# Patient Record
Sex: MALE | Race: WHITE | NOT HISPANIC OR LATINO | Employment: FULL TIME | URBAN - METROPOLITAN AREA
[De-identification: names, ages, dates, MRNs, and addresses within clinical notes are randomized per-mention and may not be internally consistent; named-entity substitution may affect disease eponyms.]

---

## 2017-04-28 ENCOUNTER — TELEPHONE (OUTPATIENT)
Dept: FAMILY MEDICINE | Facility: CLINIC | Age: 47
End: 2017-04-28

## 2017-04-28 NOTE — LETTER
[facility]   [address] [place]   [address]  [phone]    April 28, 2017      Aaron Mercado  [address]  [facility] 1/24/17 [facility] [address]              Dear Mr. Mercado,        I hope you are doing well.  Your doctor wanted me to check in with you to see how you are doing with your depression.  Please fill out the enclosed questionnaire(s) and send it back by mail or give us a call to schedule an appointment with your provider so we can assess the status of your health and prevent delays in any future refill requests.        Sincerely,    Quoc Flynn MD

## 2017-05-17 ENCOUNTER — TELEPHONE (OUTPATIENT)
Dept: FAMILY MEDICINE | Facility: CLINIC | Age: 47
End: 2017-05-17

## 2017-05-17 NOTE — TELEPHONE ENCOUNTER
Panel Management Review      Patient has the following on his problem list:     Depression / Dysthymia review  PHQ-9 SCORE 10/14/2016   Total Score 21      Patient is due for:  PHQ9      Composite cancer screening  Chart review shows that this patient is due/due soon for the following None  Summary:    Patient is due/failing the following:   PHQ9    Action needed:   Patient needs to do PHQ9.    Type of outreach:    Phone, left message for patient to call back.     Questions for provider review:    None                                                                                                                                    Cintia Hurst CMA       Chart routed to Care Team .

## 2018-02-08 ENCOUNTER — OFFICE VISIT (OUTPATIENT)
Dept: FAMILY MEDICINE | Facility: CLINIC | Age: 48
End: 2018-02-08
Payer: COMMERCIAL

## 2018-02-08 VITALS
WEIGHT: 273 LBS | HEART RATE: 78 BPM | BODY MASS INDEX: 42.13 KG/M2 | TEMPERATURE: 97.8 F | DIASTOLIC BLOOD PRESSURE: 92 MMHG | OXYGEN SATURATION: 98 % | SYSTOLIC BLOOD PRESSURE: 137 MMHG | RESPIRATION RATE: 18 BRPM

## 2018-02-08 DIAGNOSIS — I10 HYPERTENSION GOAL BP (BLOOD PRESSURE) < 140/90: ICD-10-CM

## 2018-02-08 DIAGNOSIS — F33.42 RECURRENT MAJOR DEPRESSIVE DISORDER, IN FULL REMISSION (H): ICD-10-CM

## 2018-02-08 DIAGNOSIS — E11.9 TYPE 2 DIABETES MELLITUS WITHOUT COMPLICATION, WITHOUT LONG-TERM CURRENT USE OF INSULIN (H): ICD-10-CM

## 2018-02-08 DIAGNOSIS — E78.5 HYPERLIPIDEMIA WITH TARGET LDL LESS THAN 100: ICD-10-CM

## 2018-02-08 DIAGNOSIS — M54.50 ACUTE LEFT-SIDED LOW BACK PAIN WITHOUT SCIATICA: ICD-10-CM

## 2018-02-08 LAB
ANION GAP SERPL CALCULATED.3IONS-SCNC: 4 MMOL/L (ref 3–14)
BUN SERPL-MCNC: 14 MG/DL (ref 7–30)
CALCIUM SERPL-MCNC: 9.1 MG/DL (ref 8.5–10.1)
CHLORIDE SERPL-SCNC: 103 MMOL/L (ref 94–109)
CHOLEST SERPL-MCNC: 194 MG/DL
CO2 SERPL-SCNC: 28 MMOL/L (ref 20–32)
CREAT SERPL-MCNC: 0.69 MG/DL (ref 0.66–1.25)
CREAT UR-MCNC: 215 MG/DL
GFR SERPL CREATININE-BSD FRML MDRD: >90 ML/MIN/1.7M2
GLUCOSE SERPL-MCNC: 107 MG/DL (ref 70–99)
HBA1C MFR BLD: 5.4 % (ref 4.3–6)
HDLC SERPL-MCNC: 40 MG/DL
LDLC SERPL CALC-MCNC: 132 MG/DL
MICROALBUMIN UR-MCNC: 12 MG/L
MICROALBUMIN/CREAT UR: 5.35 MG/G CR (ref 0–17)
NONHDLC SERPL-MCNC: 154 MG/DL
POTASSIUM SERPL-SCNC: 4.1 MMOL/L (ref 3.4–5.3)
SODIUM SERPL-SCNC: 135 MMOL/L (ref 133–144)
TRIGL SERPL-MCNC: 110 MG/DL
TSH SERPL DL<=0.005 MIU/L-ACNC: 2.23 MU/L (ref 0.4–4)

## 2018-02-08 PROCEDURE — 36415 COLL VENOUS BLD VENIPUNCTURE: CPT | Performed by: FAMILY MEDICINE

## 2018-02-08 PROCEDURE — 99214 OFFICE O/P EST MOD 30 MIN: CPT | Mod: 25 | Performed by: FAMILY MEDICINE

## 2018-02-08 PROCEDURE — 80048 BASIC METABOLIC PNL TOTAL CA: CPT | Performed by: FAMILY MEDICINE

## 2018-02-08 PROCEDURE — 82043 UR ALBUMIN QUANTITATIVE: CPT | Performed by: FAMILY MEDICINE

## 2018-02-08 PROCEDURE — 96372 THER/PROPH/DIAG INJ SC/IM: CPT | Performed by: FAMILY MEDICINE

## 2018-02-08 PROCEDURE — 84443 ASSAY THYROID STIM HORMONE: CPT | Performed by: FAMILY MEDICINE

## 2018-02-08 PROCEDURE — 83036 HEMOGLOBIN GLYCOSYLATED A1C: CPT | Performed by: FAMILY MEDICINE

## 2018-02-08 PROCEDURE — 80061 LIPID PANEL: CPT | Performed by: FAMILY MEDICINE

## 2018-02-08 RX ORDER — LISINOPRIL 2.5 MG/1
2.5 TABLET ORAL DAILY
Qty: 90 TABLET | Refills: 0 | Status: SHIPPED | OUTPATIENT
Start: 2018-02-08 | End: 2018-05-31

## 2018-02-08 RX ORDER — KETOROLAC TROMETHAMINE 30 MG/ML
60 INJECTION, SOLUTION INTRAMUSCULAR; INTRAVENOUS ONCE
Qty: 2 ML | Refills: 0 | OUTPATIENT
Start: 2018-02-08 | End: 2018-02-08

## 2018-02-08 RX ORDER — LISINOPRIL 2.5 MG/1
2.5 TABLET ORAL DAILY
Qty: 90 TABLET | Refills: 1 | Status: CANCELLED | OUTPATIENT
Start: 2018-02-08

## 2018-02-08 RX ORDER — CYCLOBENZAPRINE HCL 10 MG
5-10 TABLET ORAL 3 TIMES DAILY PRN
Qty: 30 TABLET | Refills: 0 | Status: SHIPPED | OUTPATIENT
Start: 2018-02-08 | End: 2018-11-18

## 2018-02-08 NOTE — LETTER
February 13, 2018      Aaron Mercado  [address]  [facility] [address]        Dear ,    We are writing to inform you of your test results.    Here are your results for your recent labs.   Your kidney functions are stable.   Your thyroid function is normal.   Your hemoglobin A1C, which measure your average blood sugar for three months has improved.   Your LDL (bad cholesterol) is elevated. Please follow up with Dr. Flynn to discuss other options to help improve your cholesterol. You can also improve your cholesterol by controlling the amount and type of fat you eat and by increasing your daily activity level.    Here are some ways to improve your nutrition:  Eat less fat (especially butter, Crisco and other saturated fats)  Buy lean cuts of meat; reduce your portions of red meat or substitute poultry or fish  Use skim milk and low-fat dairy products  Eat no more than 4 egg yolks per week  Avoid fried or fast foods that are high in fat  Eat more fruits and vegetables    Also consider starting or increasing your aerobic activity; this is the best way to improve HDL (good) cholesterol. If aerobic activity would be new to you, please talk with me first about what activities are safe for you.  Please call or message me if you have questions or concerns.     Resulted Orders   Hemoglobin A1c   Result Value Ref Range    Hemoglobin A1C 5.4 4.3 - 6.0 %   Basic metabolic panel   Result Value Ref Range    Sodium 135 133 - 144 mmol/L    Potassium 4.1 3.4 - 5.3 mmol/L    Chloride 103 94 - 109 mmol/L    Carbon Dioxide 28 20 - 32 mmol/L    Anion Gap 4 3 - 14 mmol/L    Glucose 107 (H) 70 - 99 mg/dL      Comment:      Fasting specimen    Urea Nitrogen 14 7 - 30 mg/dL    Creatinine 0.69 0.66 - 1.25 mg/dL    GFR Estimate >90 >60 mL/min/1.7m2      Comment:      Non  GFR Calc    GFR Estimate If Black >90 >60 mL/min/1.7m2      Comment:       GFR Calc    Calcium 9.1 8.5 - 10.1  mg/dL   Albumin Random Urine Quantitative with Creat Ratio   Result Value Ref Range    Creatinine Urine 215 mg/dL    Albumin Urine mg/L 12 mg/L    Albumin Urine mg/g Cr 5.35 0 - 17 mg/g Cr   Lipid Profile (Chol, Trig, HDL, LDL calc)   Result Value Ref Range    Cholesterol 194 <200 mg/dL    Triglycerides 110 <150 mg/dL      Comment:      Fasting specimen    HDL Cholesterol 40 >39 mg/dL    LDL Cholesterol Calculated 132 (H) <100 mg/dL      Comment:      Above desirable:  100-129 mg/dl  Borderline High:  130-159 mg/dL  High:             160-189 mg/dL  Very high:       >189 mg/dl      Non HDL Cholesterol 154 (H) <130 mg/dL      Comment:      Above Desirable:  130-159 mg/dl  Borderline high:  160-189 mg/dl  High:             190-219 mg/dl  Very high:       >219 mg/dl     TSH with free T4 reflex   Result Value Ref Range    TSH 2.23 0.40 - 4.00 mU/L       If you have any questions or concerns, please call the clinic at the number listed above.       Sincerely,    Geovany Escudero MD/nr

## 2018-02-08 NOTE — PROGRESS NOTES
SUBJECTIVE:   Aaron Mercado is a 47 year old male who presents to clinic today for the following health issues:    Back Pain       Duration: few days ago         Specific cause: he got up and it almost floored him, he went to the gym and used a roller and it felt good, the next day he also did it and he couldn't get off the ground  He drove to work and it was sore. When he sat down to go to the bathroom, he couldn't get up and when he got up, he couldn't move due to the pain. He felt pain in the bottom of his back. He had to leave work. He sat in his car for 10 mins. He drove to his friend house and laid on the couch. His friends his a physician and was told to take Ibuprofen 800 mg and to sit with his knees up. He wasn't able to move for a few hours due to the pain. Yesterday he felt tugging of his testicle, now resolved. No pain, swelling or discharge.     Description:   Location of pain: left lower back   Character of pain: stabbing  Pain radiation: once radiated to whole back   New numbness or weakness in legs, not attributed to pain:  no     Intensity: mild to severe     History:   Pain interferes with job: yes  History of back problems: no prior back problems  Any previous MRI or X-rays: None  Sees a specialist for back pain:  No  Therapies tried without relief: see above     Alleviating factors:   Improved by: roller initially helped       Precipitating factors:  Worsened by: Sitting    Accompanying Signs & Symptoms:  Risk of Fracture:  None  Risk of Cauda Equina:  None  Risk of Infection:  None  Risk of Cancer:  None  Risk of Ankylosing Spondylitis:  Onset at age <35, male, AND morning back stiffness. no       Diabetes mellitus - He takes metformin if he remembers.   HTN - Pt isn't currently taking the lisinopril.   MDD - Stopped taking celexa.   HLD - Stopped taking Lipitor as he was getting muscle soreness.       Problem list and histories reviewed & adjusted, as indicated.  Additional history: as  documented    Labs reviewed in EPIC    Reviewed and updated as needed this visit by clinical staff       Reviewed and updated as needed this visit by Provider         ROS:  Constitutional, HEENT, cardiovascular, pulmonary, gi and gu systems are negative, except as otherwise noted.    OBJECTIVE:     BP (!) 137/92  Pulse 78  Temp 97.8  F (36.6  C) (Oral)  Resp 18  Wt 273 lb (123.8 kg)  SpO2 98%  BMI 42.13 kg/m2  Body mass index is 42.13 kg/(m^2).  GENERAL: healthy, alert and no distress  EYES: Eyes grossly normal to inspection  HENT: nose and mouth without ulcers or lesions  MS: no gross musculoskeletal defects noted, no edema  BACK: + midline and left paralumbar tenderness, normal ROM    Diagnostic Test Results:  none     ASSESSMENT/PLAN:       1. Acute left-sided low back pain without sciatica  - likely due to lumbar strain  - ketorolac (TORADOL) 60 MG/2ML SOLN injection; Inject 2 mLs (60 mg) into the muscle once for 1 dose  Dispense: 2 mL; Refill: 0  - cyclobenzaprine (FLEXERIL) 10 MG tablet; Take 0.5-1 tablets (5-10 mg) by mouth 3 times daily as needed for muscle spasms  Dispense: 30 tablet; Refill: 0  - KETOROLAC TROMETHAMINE 15MG  - INJECTION INTRAMUSCULAR OR SUB-Q  - No Advil, aleve or aspirin over next 48 hours   - Tylenol 500 to 1000 mg every 8 hours as needed for pain, maximum daily dose is 3, 000 mg   - Flexeril 5 to 10 mg every 8 hours as needed for pain, please do not drive after taking medication or take it with alcohol   - Ice/heat pack 5 to 10 minutes, every 4 hours  - Follow if symptoms worsen or fail to improve.     2. Type 2 diabetes mellitus without complication, without long-term current use of insulin (H)  - advised to restart metformin   - metFORMIN (GLUCOPHAGE) 500 MG tablet; Take 1 tablet (500 mg) by mouth daily (with dinner)  Dispense: 90 tablet; Refill: 0  - Hemoglobin A1c  - TSH with free T4 reflex  - OPHTHALMOLOGY ADULT REFERRAL    3. Recurrent major depressive disorder, in full  remission (H)  PHQ-9 SCORE 10/14/2016 2/8/2018 2/8/2018   Total Score 21 2 0   - pt has already stopped celexa     4. Hypertension goal BP (blood pressure) < 140/90  - not controlled, restart lisinopril   - lisinopril (PRINIVIL/ZESTRIL) 2.5 MG tablet; Take 1 tablet (2.5 mg) by mouth daily  Dispense: 90 tablet; Refill: 0  - aspirin 81 MG tablet; Take 1 tablet (81 mg) by mouth daily  Dispense: 90 tablet; Refill: 3  - Basic metabolic panel  - Albumin Random Urine Quantitative with Creat Ratio  - follow up in 2 weeks     5. Hyperlipidemia with target LDL less than 100  - myalgia 2/2 Lipitor, Lipitor listed as allergy, not true allergy   - Lipid Profile (Chol, Trig, HDL, LDL calc)        Geovany Escudero MD  [facility]

## 2018-02-08 NOTE — MR AVS SNAPSHOT
After Visit Summary   2/8/2018    Aaron Mercado    MRN: [id number]           Patient Information     Date Of Birth          1970        Visit Information        Provider Department      2/8/2018 7:40 AM Geovany Escudero MD [facility]        Today's Diagnoses     Acute left-sided low back pain without sciatica    -  1    Type 2 diabetes mellitus without complication, without long-term current use of insulin (H)        Hypertension goal BP (blood pressure) < 140/90        Hyperlipidemia with target LDL less than 100          Care Instructions    - No Advil, aleve or aspirin over next 48 hours   - Tylenol 500 to 1000 mg every 8 hours as needed for pain, maximum daily dose is 3, 000 mg   - Flexeril 5 to 10 mg every 8 hours as needed for pain, please do not drive after taking medication or take it with alcohol   - Ice/heat pack 5 to 10 minutes, every 4 hours  - Follow if symptoms worsen or fail to improve.           Follow-ups after your visit        Additional Services     OPHTHALMOLOGY ADULT REFERRAL       Your provider has referred you to: [facility]: Eye [facility] [phone]   [email or web address]    Please be aware that coverage of these services is subject to the terms and limitations of your health insurance plan.  Call member services at your health plan with any benefit or coverage questions.      Please bring the following with you to your appointment:    (1) Any X-Rays, CTs or MRIs which have been performed.  Contact the facility where they were done to arrange for  prior to your scheduled appointment.    (2) List of current medications  (3) This referral request   (4) Any documents/labs given to you for this referral                  Who to contact     If you have questions or need follow up information about today's clinic visit or your schedule please contact [facility] directly at [phone].  Normal or  "non-critical lab and imaging results will be communicated to you by MyChart, letter or phone within 4 business days after the clinic has received the results. If you do not hear from us within 7 days, please contact the clinic through [employer] or phone. If you have a critical or abnormal lab result, we will notify you by phone as soon as possible.  Submit refill requests through [employer] or call your pharmacy and they will forward the refill request to us. Please allow 3 business days for your refill to be completed.          Additional Information About Your Visit        [employer] Information     [employer] lets you send messages to your doctor, view your test results, renew your prescriptions, schedule appointments and more. To sign up, go to [email or web address] . Click on \"Log in\" on the left side of the screen, which will take you to the Welcome page. Then click on \"Sign up Now\" on the right side of the page.     You will be asked to enter the access code listed below, as well as some personal information. Please follow the directions to create your username and password.     Your access code is: [email or web address]  Expires: 2018  8:22 AM     Your access code will  in 90 days. If you need help or a new code, please call your [place] clinic or [phone].        Care EveryWhere ID     This is your Care EveryWhere ID. This could be used by other organizations to access your [place] medical records  [id number]        Your Vitals Were     Pulse Temperature Respirations Pulse Oximetry BMI (Body Mass Index)       78 97.8  F (36.6  C) (Oral) 18 98% 42.13 kg/m2        Blood Pressure from Last 3 Encounters:   18 (!) 137/92   16 120/88   10/14/16 (!) 146/94    Weight from Last 3 Encounters:   18 273 lb (123.8 kg)   16 257 lb (116.6 kg)   10/14/16 260 lb 8 oz (118.2 kg)              We Performed the Following     Albumin Random Urine Quantitative with Creat Ratio     Basic metabolic panel     " Hemoglobin A1c     Lipid Profile (Chol, Trig, HDL, LDL calc)     OPHTHALMOLOGY ADULT REFERRAL     TSH with free T4 reflex          Today's Medication Changes          These changes are accurate as of 2/8/18  8:22 AM.  If you have any questions, ask your nurse or doctor.               Start taking these medicines.        Dose/Directions    cyclobenzaprine 10 MG tablet   Commonly known as:  FLEXERIL   Used for:  Acute left-sided low back pain without sciatica   Started by:  Geovany Escudero MD        Dose:  5-10 mg   Take 0.5-1 tablets (5-10 mg) by mouth 3 times daily as needed for muscle spasms   Quantity:  30 tablet   Refills:  0       ketorolac 60 MG/2ML Soln injection   Commonly known as:  TORADOL   Used for:  Acute left-sided low back pain without sciatica   Started by:  Geovany Escudero MD        Dose:  60 mg   Inject 2 mLs (60 mg) into the muscle once for 1 dose   Quantity:  2 mL   Refills:  0         Stop taking these medicines if you haven't already. Please contact your care team if you have questions.     atorvastatin 10 MG tablet   Commonly known as:  LIPITOR   Stopped by:  Geovany Escudero MD           citalopram 20 MG tablet   Commonly known as:  celeXA   Stopped by:  Geovany Escudero MD           zolpidem 5 MG tablet   Commonly known as:  AMBIEN   Stopped by:  Geovany Escudero MD                Where to get your medicines      These medications were sent to [facility] [address]  [address], [facility] [address]     Phone:  [phone]     aspirin 81 MG tablet    cyclobenzaprine 10 MG tablet    lisinopril 2.5 MG tablet    metFORMIN 500 MG tablet         Some of these will need a paper prescription and others can be bought over the counter.  Ask your nurse if you have questions.     You don't need a prescription for these medications     ketorolac 60 MG/2ML Soln injection                Primary Care Provider Office Phone # Fax #     Quoc Flynn [phone] [address] [place] [id number]        Equal Access to Services     BRANDY SAMSON : Hadprema aad ku hadbeth Charles, hemalatha manley, qaann marieta kayogeshda kenyetta, josephine essiein hayaan lyndsyekathy bliss allison manzo. So [facility] [phone].    ATENCIÓN: Si habla español, tiene a nails disposición servicios gratuitos de asistencia lingüística. Llame al [phone].    We comply with applicable federal civil rights laws and [place] laws. We do not discriminate on the basis of race, color, national origin, age, disability, sex, sexual orientation, or gender identity.            Thank you!     Thank you for choosing [facility]  for your care. Our goal is always to provide you with excellent care. Hearing back from our patients is one way we can continue to improve our services. Please take a few minutes to complete the written survey that you may receive in the mail after your visit with us. Thank you!             Your Updated Medication List - Protect others around you: Learn how to safely use, store and throw away your medicines at www.disposemymeds.org.          This list is accurate as of 2/8/18  8:22 AM.  Always use your most recent med list.                   Brand Name Dispense Instructions for use Diagnosis    aspirin 81 MG tablet     90 tablet    Take 1 tablet (81 mg) by mouth daily    Hypertension goal BP (blood pressure) < 140/90       blood glucose monitoring lancets     1 each    1 Box 2 times daily    Type 2 diabetes, HbA1c goal < 7% (H)       blood glucose monitoring test strip    PIETER CONTOUR NEXT    1 Month    1 Month by In Vitro route 2 times daily    Type 2 diabetes, HbA1c goal < 7% (H)       cyclobenzaprine 10 MG tablet    FLEXERIL    30 tablet    Take 0.5-1 tablets (5-10 mg) by mouth 3 times daily as needed for muscle spasms    Acute left-sided low back pain without sciatica       ketorolac 60 MG/2ML Soln injection    TORADOL    2 mL     Inject 2 mLs (60 mg) into the muscle once for 1 dose    Acute left-sided low back pain without sciatica       lisinopril 2.5 MG tablet    PRINIVIL/Zestril    90 tablet    Take 1 tablet (2.5 mg) by mouth daily    Hypertension goal BP (blood pressure) < 140/90       metFORMIN 500 MG tablet    GLUCOPHAGE    90 tablet    Take 1 tablet (500 mg) by mouth daily (with dinner)    Type 2 diabetes mellitus without complication, without long-term current use of insulin (H)       order for DME     1 each    Equipment being ordered: glucometer    Type 2 diabetes, HbA1c goal < 7% (H)       sildenafil 50 MG tablet    VIAGRA    6 tablet    Take 0.5-1 tablets (25-50 mg) by mouth daily as needed for erectile dysfunction Take 30 min to 4 hours before intercourse.    ED (erectile dysfunction)

## 2018-02-08 NOTE — NURSING NOTE
Prior to injection verified patient identity using patient's name and date of birth.    Due to injection administration, patient instructed to remain in clinic for 15 minutes  afterwards, and to report any adverse reaction to me immediately.    The following medication was given:     MEDICATION: Ketorolac Tromethamine 60MG/2ML (30 mg/mL) (Toradol)  ROUTE: IM  SITE: Ventrogluteal - Left (1ml) and Ventrogluteal - Right (1ml)  DOSE: 2 ml  LOT #: [id number]  :  Melodigram  EXPIRATION DATE:  09/2019  ND: [id number]    Ofelia Badillo MA

## 2018-02-08 NOTE — PATIENT INSTRUCTIONS
- No Advil, aleve or aspirin over next 48 hours   - Tylenol 500 to 1000 mg every 8 hours as needed for pain, maximum daily dose is 3, 000 mg   - Flexeril 5 to 10 mg every 8 hours as needed for pain, please do not drive after taking medication or take it with alcohol   - Ice/heat pack 5 to 10 minutes, every 4 hours  - Follow if symptoms worsen or fail to improve.

## 2018-02-09 ASSESSMENT — PATIENT HEALTH QUESTIONNAIRE - PHQ9: SUM OF ALL RESPONSES TO PHQ QUESTIONS 1-9: 0

## 2018-02-12 NOTE — PROGRESS NOTES
Please send the letter to the patient with the following.         Here are your results for your recent labs.   Your kidney functions are stable.   Your thyroid function is normal.   Your hemoglobin A1C, which measure your average blood sugar for three months has improved.   Your LDL (bad cholesterol) is elevated. Please follow up with Dr. Flynn to discuss other options to help improve your cholesterol. You can also improve your cholesterol by controlling the amount and type of fat you eat and by increasing your daily activity level.    Here are some ways to improve your nutrition:  Eat less fat (especially butter, Crisco and other saturated fats)  Buy lean cuts of meat; reduce your portions of red meat or substitute poultry or fish  Use skim milk and low-fat dairy products  Eat no more than 4 egg yolks per week  Avoid fried or fast foods that are high in fat  Eat more fruits and vegetables    Also consider starting or increasing your aerobic activity; this is the best way to improve HDL (good) cholesterol. If aerobic activity would be new to you, please talk with me first about what activities are safe for you.  Please call or message me if you have questions or concerns.

## 2018-05-31 ENCOUNTER — OFFICE VISIT (OUTPATIENT)
Dept: FAMILY MEDICINE | Facility: CLINIC | Age: 48
End: 2018-05-31
Payer: COMMERCIAL

## 2018-05-31 VITALS
BODY MASS INDEX: 40.2 KG/M2 | HEART RATE: 84 BPM | RESPIRATION RATE: 19 BRPM | OXYGEN SATURATION: 96 % | SYSTOLIC BLOOD PRESSURE: 137 MMHG | TEMPERATURE: 98.6 F | DIASTOLIC BLOOD PRESSURE: 95 MMHG | WEIGHT: 260.5 LBS

## 2018-05-31 DIAGNOSIS — I10 HYPERTENSION GOAL BP (BLOOD PRESSURE) < 140/90: ICD-10-CM

## 2018-05-31 DIAGNOSIS — G47.10 EXCESSIVE SLEEPINESS: ICD-10-CM

## 2018-05-31 DIAGNOSIS — H53.9 VISUAL DISTURBANCE: ICD-10-CM

## 2018-05-31 DIAGNOSIS — M54.2 CERVICALGIA: ICD-10-CM

## 2018-05-31 DIAGNOSIS — E11.9 TYPE 2 DIABETES MELLITUS WITHOUT COMPLICATION, WITHOUT LONG-TERM CURRENT USE OF INSULIN (H): ICD-10-CM

## 2018-05-31 DIAGNOSIS — G44.219 EPISODIC TENSION-TYPE HEADACHE, NOT INTRACTABLE: Primary | ICD-10-CM

## 2018-05-31 DIAGNOSIS — R53.83 FATIGUE, UNSPECIFIED TYPE: ICD-10-CM

## 2018-05-31 LAB
BASOPHILS # BLD AUTO: 0.1 10E9/L (ref 0–0.2)
BASOPHILS NFR BLD AUTO: 1 %
DIFFERENTIAL METHOD BLD: ABNORMAL
EOSINOPHIL # BLD AUTO: 0.1 10E9/L (ref 0–0.7)
EOSINOPHIL NFR BLD AUTO: 2.6 %
ERYTHROCYTE [DISTWIDTH] IN BLOOD BY AUTOMATED COUNT: 13.1 % (ref 10–15)
HCT VFR BLD AUTO: 46.9 % (ref 40–53)
HGB BLD-MCNC: 16.4 G/DL (ref 13.3–17.7)
LYMPHOCYTES # BLD AUTO: 0.9 10E9/L (ref 0.8–5.3)
LYMPHOCYTES NFR BLD AUTO: 17.5 %
MCH RBC QN AUTO: 33.1 PG (ref 26.5–33)
MCHC RBC AUTO-ENTMCNC: 35 G/DL (ref 31.5–36.5)
MCV RBC AUTO: 95 FL (ref 78–100)
MONOCYTES # BLD AUTO: 0.4 10E9/L (ref 0–1.3)
MONOCYTES NFR BLD AUTO: 8.5 %
NEUTROPHILS # BLD AUTO: 3.6 10E9/L (ref 1.6–8.3)
NEUTROPHILS NFR BLD AUTO: 70.4 %
PLATELET # BLD AUTO: 264 10E9/L (ref 150–450)
RBC # BLD AUTO: 4.96 10E12/L (ref 4.4–5.9)
WBC # BLD AUTO: 5.1 10E9/L (ref 4–11)

## 2018-05-31 PROCEDURE — 36415 COLL VENOUS BLD VENIPUNCTURE: CPT | Performed by: FAMILY MEDICINE

## 2018-05-31 PROCEDURE — 99214 OFFICE O/P EST MOD 30 MIN: CPT | Performed by: FAMILY MEDICINE

## 2018-05-31 PROCEDURE — 80053 COMPREHEN METABOLIC PANEL: CPT | Performed by: FAMILY MEDICINE

## 2018-05-31 PROCEDURE — 99207 C FOOT EXAM  NO CHARGE: CPT | Mod: 25 | Performed by: FAMILY MEDICINE

## 2018-05-31 PROCEDURE — 85025 COMPLETE CBC W/AUTO DIFF WBC: CPT | Performed by: FAMILY MEDICINE

## 2018-05-31 PROCEDURE — 84443 ASSAY THYROID STIM HORMONE: CPT | Performed by: FAMILY MEDICINE

## 2018-05-31 RX ORDER — LISINOPRIL 5 MG/1
5 TABLET ORAL DAILY
Qty: 90 TABLET | Refills: 0 | Status: SHIPPED | OUTPATIENT
Start: 2018-05-31 | End: 2018-06-13

## 2018-05-31 RX ORDER — NAPROXEN 500 MG/1
500 TABLET ORAL 2 TIMES DAILY WITH MEALS
Qty: 10 TABLET | Refills: 0 | Status: SHIPPED | OUTPATIENT
Start: 2018-05-31 | End: 2018-06-05

## 2018-05-31 ASSESSMENT — ANXIETY QUESTIONNAIRES
5. BEING SO RESTLESS THAT IT IS HARD TO SIT STILL: NOT AT ALL
GAD7 TOTAL SCORE: 13
IF YOU CHECKED OFF ANY PROBLEMS ON THIS QUESTIONNAIRE, HOW DIFFICULT HAVE THESE PROBLEMS MADE IT FOR YOU TO DO YOUR WORK, TAKE CARE OF THINGS AT HOME, OR GET ALONG WITH OTHER PEOPLE: VERY DIFFICULT
7. FEELING AFRAID AS IF SOMETHING AWFUL MIGHT HAPPEN: NEARLY EVERY DAY
2. NOT BEING ABLE TO STOP OR CONTROL WORRYING: MORE THAN HALF THE DAYS
1. FEELING NERVOUS, ANXIOUS, OR ON EDGE: MORE THAN HALF THE DAYS
3. WORRYING TOO MUCH ABOUT DIFFERENT THINGS: NEARLY EVERY DAY
6. BECOMING EASILY ANNOYED OR IRRITABLE: MORE THAN HALF THE DAYS

## 2018-05-31 ASSESSMENT — PATIENT HEALTH QUESTIONNAIRE - PHQ9: 5. POOR APPETITE OR OVEREATING: SEVERAL DAYS
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
## 2018-05-31 NOTE — LETTER
My Depression Action Plan  Name: Aaron Mercado   Date of Birth 1970  Date: 5/31/2018    My doctor: Quoc Flynn   My clinic: [address] [address] [phone]          GREEN    ZONE   Good Control    What it looks like:     Things are going generally well. You have normal up s and down s. You may even feel depressed from time to time, but bad moods usually last less than a day.   What you need to do:  1. Continue to care for yourself (see self care plan)  2. Check your depression survival kit and update it as needed  3. Follow your physician s recommendations including any medication.  4. Do not stop taking medication unless you consult with your physician first.           YELLOW         ZONE Getting Worse    What it looks like:     Depression is starting to interfere with your life.     It may be hard to get out of bed; you may be starting to isolate yourself from others.    Symptoms of depression are starting to last most all day and this has happened for several days.     You may have suicidal thoughts but they are not constant.   What you need to do:     1. Call your care team, your response to treatment will improve if you keep your care team informed of your progress. Yellow periods are signs an adjustment may need to be made.     2. Continue your self-care, even if you have to fake it!    3. Talk to someone in your support network    4. Open up your depression survival kit           RED    ZONE Medical Alert - Get Help    What it looks like:     Depression is seriously interfering with your life.     You may experience these or other symptoms: You can t get out of bed most days, can t work or engage in other necessary activities, you have trouble taking care of basic hygiene, or basic responsibilities, thoughts of suicide or death that will not go away, self-injurious behavior.     What you need to do:  1. Call your care  team and request a same-day appointment. If they are not available (weekends or after hours) call your local crisis line, emergency room or 911.            Depression Self Care Plan / Survival Kit    Self-Care for Depression  Here s the deal. Your body and mind are really not as separate as most people think.  What you do and think affects how you feel and how you feel influences what you do and think. This means if you do things that people who feel good do, it will help you feel better.  Sometimes this is all it takes.  There is also a place for medication and therapy depending on how severe your depression is, so be sure to consult with your medical provider and/ or Behavioral Health Consultant if your symptoms are worsening or not improving.     In order to better manage my stress, I will:    Exercise  Get some form of exercise, every day. This will help reduce pain and release endorphins, the  feel good  chemicals in your brain. This is almost as good as taking antidepressants!  This is not the same as joining a gym and then never going! (they count on that by the way ) It can be as simple as just going for a walk or doing some gardening, anything that will get you moving.      Hygiene   Maintain good hygiene (Get out of bed in the morning, Make your bed, Brush your teeth, Take a shower, and Get dressed like you were going to work, even if you are unemployed).  If your clothes don't fit try to get ones that do.    Diet  I will strive to eat foods that are good for me, drink plenty of water, and avoid excessive sugar, caffeine, alcohol, and other mood-altering substances.  Some foods that are helpful in depression are: complex carbohydrates, B vitamins, flaxseed, fish or fish oil, fresh fruits and vegetables.    Psychotherapy  I agree to participate in Individual Therapy (if recommended).    Medication  If prescribed medications, I agree to take them.  Missing doses can result in serious side effects.  I  understand that drinking alcohol, or other illicit drug use, may cause potential side effects.  I will not stop my medication abruptly without first discussing it with my provider.    Staying Connected With Others  I will stay in touch with my friends, family members, and my primary care provider/team.    Use your imagination  Be creative.  We all have a creative side; it doesn t matter if it s oil painting, sand castles, or mud pies! This will also kick up the endorphins.    Witness Beauty  (AKA stop and smell the roses) Take a look outside, even in mid-winter. Notice colors, textures. Watch the squirrels and birds.     Service to others  Be of service to others.  There is always someone else in need.  By helping others we can  get out of ourselves  and remember the really important things.  This also provides opportunities for practicing all the other parts of the program.    Humor  Laugh and be silly!  Adjust your TV habits for less news and crime-drama and more comedy.    Control your stress  Try breathing deep, massage therapy, biofeedback, and meditation. Find time to relax each day.     My support system    Clinic Contact:  Phone number:    Contact 1:  Phone number:    Contact 2:  Phone number:    [facility]/:  Phone number:    Therapist:  Phone number:    Local crisis center:    Phone number:    Other community support:  Phone number:

## 2018-05-31 NOTE — PATIENT INSTRUCTIONS
Suspect tension headaches  May be related to stress and BP  Do labs  Naproxen 500 mg twice a day 5 days with food  Try the flexeril you have at home may help    See neuro and do MRI head if  Not better  Increase lisinopril to 5 mg daily  Follow up in 2 week with dr chester yang if has time today   address stress and depression and anxiety in more detail at follow up visit

## 2018-05-31 NOTE — LETTER
June 1, 2018      Aaron Mercado  [address]  [facility] [address]        Dear ,    We are writing to inform you of your test results.    Results within acceptable limits.  -Liver and gallbladder tests are normal. (ALT,AST, Alk phos, bilirubin), kidney function is normal (Cr, GFR), Sodium is normal, Potassium is normal, Calcium is normal, Glucose is normal (diabetes screening test).   -TSH (thyroid stimulating hormone) level is normal which indicates normal thyroid function..    Resulted Orders   CBC with platelets differential   Result Value Ref Range    WBC 5.1 4.0 - 11.0 10e9/L    RBC Count 4.96 4.4 - 5.9 10e12/L    Hemoglobin 16.4 13.3 - 17.7 g/dL    Hematocrit 46.9 40.0 - 53.0 %    MCV 95 78 - 100 fl    MCH 33.1 (H) 26.5 - 33.0 pg    MCHC 35.0 31.5 - 36.5 g/dL    RDW 13.1 10.0 - 15.0 %    Platelet Count 264 150 - 450 10e9/L    Diff Method Automated Method     % Neutrophils 70.4 %    % Lymphocytes 17.5 %    % Monocytes 8.5 %    % Eosinophils 2.6 %    % Basophils 1.0 %    Absolute Neutrophil 3.6 1.6 - 8.3 10e9/L    Absolute Lymphocytes 0.9 0.8 - 5.3 10e9/L    Absolute Monocytes 0.4 0.0 - 1.3 10e9/L    Absolute Eosinophils 0.1 0.0 - 0.7 10e9/L    Absolute Basophils 0.1 0.0 - 0.2 10e9/L   Comprehensive metabolic panel   Result Value Ref Range    Sodium 140 133 - 144 mmol/L    Potassium 4.2 3.4 - 5.3 mmol/L    Chloride 105 94 - 109 mmol/L    Carbon Dioxide 27 20 - 32 mmol/L    Anion Gap 8 3 - 14 mmol/L    Glucose 109 (H) 70 - 99 mg/dL      Comment:      Non Fasting    Urea Nitrogen 16 7 - 30 mg/dL    Creatinine 0.75 0.66 - 1.25 mg/dL    GFR Estimate >90 >60 mL/min/1.7m2      Comment:      Non  GFR Calc    GFR Estimate If Black >90 >60 mL/min/1.7m2      Comment:       GFR Calc    Calcium 9.4 8.5 - 10.1 mg/dL    Bilirubin Total 1.0 0.2 - 1.3 mg/dL    Albumin 3.9 3.4 - 5.0 g/dL    Protein Total 8.1 6.8 - 8.8 g/dL    Alkaline Phosphatase 87 40 - 150 U/L     ALT 48 0 - 70 U/L    AST 30 0 - 45 U/L   TSH with free T4 reflex   Result Value Ref Range    TSH 1.14 0.40 - 4.00 mU/L       If you have any questions or concerns, please call the clinic at the number listed above.       Sincerely,        Viji Rees MD/nr

## 2018-05-31 NOTE — MR AVS SNAPSHOT
After Visit Summary   5/31/2018    Aaron Mercado    MRN: [id number]           Patient Information     Date Of Birth          1970        Visit Information        Provider Department      5/31/2018 11:00 AM Viji Rees MD [facility] [place]        Today's Diagnoses     Episodic tension-type headache, not intractable    -  1    Cervicalgia        Visual disturbance        Hypertension goal BP (blood pressure) < 140/90        Type 2 diabetes mellitus without complication, without long-term current use of insulin (H)        Fatigue, unspecified type        Excessive sleepiness          Care Instructions    Suspect tension headaches  May be related to stress and BP  Do labs  Naproxen 500 mg twice a day 5 days with food  Try the flexeril you have at home may help    See neuro and do MRI head if  Not better  Increase lisinopril to 5 mg daily  Follow up in 2 week with dr briggs           Follow-ups after your visit        Additional Services     NEUROLOGY ADULT REFERRAL       Your provider has referred you to: [facility]: [facility] [phone]   [email or web address]    [facility] Procedure/Referral:   Please be aware that coverage of these services is subject to the terms and limitations of your health insurance plan.  Call member services at your health plan with any benefit or coverage questions.      Please bring the following with you to your appointment:    (1) Any X-Rays, CTs or MRIs which have been performed.  Contact the facility where they were done to arrange for  prior to your scheduled appointment.  Any new CT, MRI or other procedures ordered by your specialist must be performed at a [facility] facility or coordinated by your clinic's referral office.  (2) List of current medications  (3) This referral request   (4) Any documents/labs given to you for this referral            SLEEP EVALUATION & MANAGEMENT REFERRAL - ADULT -[facility]  [facility]  [phone] (Age 2 and up)       Please be aware that coverage of these services is subject to the terms and limitations of your health insurance plan.  Call member services at your health plan with any benefit or coverage questions.      Please bring the following to your appointment:    >>   List of current medications   >>   This referral request   >>   Any documents/labs given to you for this referral    [facility]  Ph [phone] (Age 15 and up)                  Future tests that were ordered for you today     Open Future Orders        Priority Expected Expires Ordered    SLEEP EVALUATION & MANAGEMENT REFERRAL - ADULT -[facility]  [phone] (Age 2 and up) Routine  6/1/2019 5/31/2018    MR Brain w/o & w Contrast Routine  5/31/2019 5/31/2018            Who to contact     If you have questions or need follow up information about today's clinic visit or your schedule please contact [facility] directly at [phone].  Normal or non-critical lab and imaging results will be communicated to you by MyChart, letter or phone within 4 business days after the clinic has received the results. If you do not hear from us within 7 days, please contact the clinic through MyChart or phone. If you have a critical or abnormal lab result, we will notify you by phone as soon as possible.  Submit refill requests through [employer] or call your pharmacy and they will forward the refill request to us. Please allow 3 business days for your refill to be completed.          Additional Information About Your Visit        Care EveryWhere ID     This is your Care EveryWhere ID. This could be used by other organizations to access your [facility] medical records  [id number]        Your Vitals Were     Pulse Temperature Respirations Pulse Oximetry BMI (Body Mass Index)       84 98.6  F (37  C)  (Oral) 19 96% 40.2 kg/m2        Blood Pressure from Last 3 Encounters:   05/31/18 (!) 137/95   02/08/18 (!) 137/92   12/14/16 120/88    Weight from Last 3 Encounters:   05/31/18 260 lb 8 oz (118.2 kg)   02/08/18 273 lb (123.8 kg)   12/14/16 257 lb (116.6 kg)              We Performed the Following     C FOOT EXAM  NO CHARGE     CBC with platelets differential     Comprehensive metabolic panel     DEPRESSION ACTION PLAN (DAP)     NEUROLOGY ADULT REFERRAL     TSH with free T4 reflex          Today's Medication Changes          These changes are accurate as of 5/31/18 11:44 AM.  If you have any questions, ask your nurse or doctor.               Start taking these medicines.        Dose/Directions    naproxen 500 MG tablet   Commonly known as:  NAPROSYN   Used for:  Episodic tension-type headache, not intractable   Started by:  Viji Rees MD        Dose:  500 mg   Take 1 tablet (500 mg) by mouth 2 times daily (with meals) for 5 days   Quantity:  10 tablet   Refills:  0         These medicines have changed or have updated prescriptions.        Dose/Directions    lisinopril 5 MG tablet   Commonly known as:  PRINIVIL/ZESTRIL   This may have changed:    - medication strength  - how much to take   Used for:  Hypertension goal BP (blood pressure) < 140/90   Changed by:  Viji Rees MD        Dose:  5 mg   Take 1 tablet (5 mg) by mouth daily   Quantity:  90 tablet   Refills:  0            Where to get your medicines      These medications were sent to [facility] [place] [address]  [facility] [address] [facility] [address]     Phone:  [phone]     lisinopril 5 MG tablet    naproxen 500 MG tablet                Primary Care Provider Office Phone # Fax #    Quoc Trevin Flynn [phone]       [facility] [address] [place] [address]        Equal Access to Services     BRANDY SAMSON AH: Ashley Charles, hemalatha manley, josephine spears  sameer lyndseykathy elinlianet cooper ah. So [facility] [phone].    ATENCIÓN: Si demetrisla cameron, tiene a nails disposición servicios gratuitos de asistencia lingüística. [place] law [phone].    We comply with applicable federal civil rights laws and [place] laws. We do not discriminate on the basis of race, color, national origin, age, disability, sex, sexual orientation, or gender identity.            Thank you!     Thank you for choosing [facility]  for your care. Our goal is always to provide you with excellent care. Hearing back from our patients is one way we can continue to improve our services. Please take a few minutes to complete the written survey that you may receive in the mail after your visit with us. Thank you!             Your Updated Medication List - Protect others around you: Learn how to safely use, store and throw away your medicines at www.disposemymeds.org.          This list is accurate as of 5/31/18 11:44 AM.  Always use your most recent med list.                   Brand Name Dispense Instructions for use Diagnosis    aspirin 81 MG tablet     90 tablet    Take 1 tablet (81 mg) by mouth daily    Hypertension goal BP (blood pressure) < 140/90       blood glucose monitoring lancets     1 each    1 Box 2 times daily    Type 2 diabetes, HbA1c goal < 7% (H)       blood glucose monitoring test strip    PIETER CONTOUR NEXT    1 Month    1 Month by In Vitro route 2 times daily    Type 2 diabetes, HbA1c goal < 7% (H)       cyclobenzaprine 10 MG tablet    FLEXERIL    30 tablet    Take 0.5-1 tablets (5-10 mg) by mouth 3 times daily as needed for muscle spasms    Acute left-sided low back pain without sciatica       lisinopril 5 MG tablet    PRINIVIL/ZESTRIL    90 tablet    Take 1 tablet (5 mg) by mouth daily    Hypertension goal BP (blood pressure) < 140/90       metFORMIN 500 MG tablet    GLUCOPHAGE    90 tablet    Take 1 tablet (500 mg) by mouth daily (with dinner)    Type 2 diabetes mellitus  without complication, without long-term current use of insulin (H)       naproxen 500 MG tablet    NAPROSYN    10 tablet    Take 1 tablet (500 mg) by mouth 2 times daily (with meals) for 5 days    Episodic tension-type headache, not intractable       order for DME     1 each    Equipment being ordered: glucometer    Type 2 diabetes, HbA1c goal < 7% (H)       sildenafil 50 MG tablet    VIAGRA    6 tablet    Take 0.5-1 tablets (25-50 mg) by mouth daily as needed for erectile dysfunction Take 30 min to 4 hours before intercourse.    ED (erectile dysfunction)

## 2018-05-31 NOTE — LETTER
May 31, 2018      Aaron Mercado  [address]  [facility] [place] [address]        Dear ,    We are writing to inform you of your test results.    Results within acceptable limits.  -Normal red blood cell (hgb) levels, normal white blood cell count and normal platelet levels..    Resulted Orders   CBC with platelets differential   Result Value Ref Range    WBC 5.1 4.0 - 11.0 10e9/L    RBC Count 4.96 4.4 - 5.9 10e12/L    Hemoglobin 16.4 13.3 - 17.7 g/dL    Hematocrit 46.9 40.0 - 53.0 %    MCV 95 78 - 100 fl    MCH 33.1 (H) 26.5 - 33.0 pg    MCHC 35.0 31.5 - 36.5 g/dL    RDW 13.1 10.0 - 15.0 %    Platelet Count 264 150 - 450 10e9/L    Diff Method Automated Method     % Neutrophils 70.4 %    % Lymphocytes 17.5 %    % Monocytes 8.5 %    % Eosinophils 2.6 %    % Basophils 1.0 %    Absolute Neutrophil 3.6 1.6 - 8.3 10e9/L    Absolute Lymphocytes 0.9 0.8 - 5.3 10e9/L    Absolute Monocytes 0.4 0.0 - 1.3 10e9/L    Absolute Eosinophils 0.1 0.0 - 0.7 10e9/L    Absolute Basophils 0.1 0.0 - 0.2 10e9/L     If you have any questions or concerns, please call the clinic at the number listed above.   Sincerely,  Viji Rees MD/nr

## 2018-06-01 LAB
ALBUMIN SERPL-MCNC: 3.9 G/DL (ref 3.4–5)
ALP SERPL-CCNC: 87 U/L (ref 40–150)
ALT SERPL W P-5'-P-CCNC: 48 U/L (ref 0–70)
ANION GAP SERPL CALCULATED.3IONS-SCNC: 8 MMOL/L (ref 3–14)
AST SERPL W P-5'-P-CCNC: 30 U/L (ref 0–45)
BILIRUB SERPL-MCNC: 1 MG/DL (ref 0.2–1.3)
BUN SERPL-MCNC: 16 MG/DL (ref 7–30)
CALCIUM SERPL-MCNC: 9.4 MG/DL (ref 8.5–10.1)
CHLORIDE SERPL-SCNC: 105 MMOL/L (ref 94–109)
CO2 SERPL-SCNC: 27 MMOL/L (ref 20–32)
CREAT SERPL-MCNC: 0.75 MG/DL (ref 0.66–1.25)
GFR SERPL CREATININE-BSD FRML MDRD: >90 ML/MIN/1.7M2
GLUCOSE SERPL-MCNC: 109 MG/DL (ref 70–99)
POTASSIUM SERPL-SCNC: 4.2 MMOL/L (ref 3.4–5.3)
PROT SERPL-MCNC: 8.1 G/DL (ref 6.8–8.8)
SODIUM SERPL-SCNC: 140 MMOL/L (ref 133–144)
TSH SERPL DL<=0.005 MIU/L-ACNC: 1.14 MU/L (ref 0.4–4)

## 2018-06-01 ASSESSMENT — ANXIETY QUESTIONNAIRES: GAD7 TOTAL SCORE: 13

## 2018-06-01 ASSESSMENT — PATIENT HEALTH QUESTIONNAIRE - PHQ9: SUM OF ALL RESPONSES TO PHQ QUESTIONS 1-9: 11

## 2018-06-13 ENCOUNTER — OFFICE VISIT (OUTPATIENT)
Dept: FAMILY MEDICINE | Facility: CLINIC | Age: 48
End: 2018-06-13
Payer: COMMERCIAL

## 2018-06-13 VITALS
HEART RATE: 84 BPM | WEIGHT: 261 LBS | BODY MASS INDEX: 40.97 KG/M2 | OXYGEN SATURATION: 96 % | SYSTOLIC BLOOD PRESSURE: 114 MMHG | HEIGHT: 67 IN | DIASTOLIC BLOOD PRESSURE: 62 MMHG | TEMPERATURE: 98.8 F | RESPIRATION RATE: 16 BRPM

## 2018-06-13 DIAGNOSIS — E11.9 TYPE 2 DIABETES MELLITUS WITHOUT COMPLICATION, WITHOUT LONG-TERM CURRENT USE OF INSULIN (H): Primary | ICD-10-CM

## 2018-06-13 DIAGNOSIS — Z23 NEED FOR PROPHYLACTIC VACCINATION WITH TETANUS-DIPHTHERIA (TD): ICD-10-CM

## 2018-06-13 DIAGNOSIS — E66.01 MORBID OBESITY (H): ICD-10-CM

## 2018-06-13 DIAGNOSIS — I10 HYPERTENSION GOAL BP (BLOOD PRESSURE) < 140/90: ICD-10-CM

## 2018-06-13 LAB — HBA1C MFR BLD: 5.1 % (ref 0–5.6)

## 2018-06-13 PROCEDURE — 90715 TDAP VACCINE 7 YRS/> IM: CPT | Performed by: FAMILY MEDICINE

## 2018-06-13 PROCEDURE — 36415 COLL VENOUS BLD VENIPUNCTURE: CPT | Performed by: FAMILY MEDICINE

## 2018-06-13 PROCEDURE — 83036 HEMOGLOBIN GLYCOSYLATED A1C: CPT | Performed by: FAMILY MEDICINE

## 2018-06-13 PROCEDURE — 90471 IMMUNIZATION ADMIN: CPT | Performed by: FAMILY MEDICINE

## 2018-06-13 PROCEDURE — 99214 OFFICE O/P EST MOD 30 MIN: CPT | Mod: 25 | Performed by: FAMILY MEDICINE

## 2018-06-13 RX ORDER — LISINOPRIL 5 MG/1
5 TABLET ORAL DAILY
Qty: 90 TABLET | Refills: 1 | Status: SHIPPED | OUTPATIENT
Start: 2018-06-13 | End: 2018-11-18

## 2018-06-13 NOTE — NURSING NOTE
Screening Questionnaire for Adult Immunization    Are you sick today?   No   Do you have allergies to medications, food, a vaccine component or latex?   No   Have you ever had a serious reaction after receiving a vaccination?   No   Do you have a long-term health problem with heart disease, lung disease, asthma, kidney disease, metabolic disease (e.g. diabetes), anemia, or other blood disorder?   Yes   Do you have cancer, leukemia, HIV/AIDS, or any other immune system problem?   No   In the past 3 months, have you taken medications that affect  your immune system, such as prednisone, other steroids, or anticancer drugs; drugs for the treatment of rheumatoid arthritis, Crohn s disease, or psoriasis; or have you had radiation treatments?   No   Have you had a seizure, or a brain or other nervous system problem?   No   During the past year, have you received a transfusion of blood or blood     products, or been given immune (gamma) globulin or antiviral drug?   No   For women: Are you pregnant or is there a chance you could become        pregnant during the next month?   No   Have you received any vaccinations in the past 4 weeks?   No     Immunization questionnaire answers were all negative.        Per orders of Dr. Flynn, injection of Tdap (adacel) given by Cintia Hurst. Patient instructed to remain in clinic for 15 minutes afterwards, and to report any adverse reaction to me immediately.    Due to injection administration, patient instructed to remain in clinic for 15 minutes  afterwards, and to report any adverse reaction to me immediately.       Screening performed by Cintia Hurst on 6/13/2018 at 11:29 AM.

## 2018-06-13 NOTE — PROGRESS NOTES
SUBJECTIVE:   Aaron Mercado is a 47 year old male who presents to clinic today for the following health issues:      Hyperlipidemia Follow-Up      Rate your low fat/cholesterol diet?: good    Taking statin?  No    Other lipid medications/supplements?:  none    Hypertension Follow-up      Outpatient blood pressures are being checked at work.  Results are 118/77? unsure.    Low Salt Diet: not monitoring salt      Amount of exercise or physical activity: 5-6 days/week for an average of greater than 60 minutes    Problems taking medications regularly: No    Medication side effects: none    Diet: carbohydrate counting and low sugar, lots of protein       Not too consistent with metformin. Using almost every other day. Does not use it when out of the house.     Problem list and histories reviewed & adjusted, as indicated.  Additional history: as documented    Labs reviewed in EPIC    Reviewed and updated as needed this visit by clinical staff       Reviewed and updated as needed this visit by Provider           Social History     Social History     Marital status: Single     Spouse name: N/A     Number of children: 1     Years of education: N/A     Occupational History     [employer]     Social History Main Topics     Smoking status: Never Smoker     Smokeless tobacco: Never Used      Comment: Non smoke home     Alcohol use Yes      Comment: 1-2 nights out during the week     Drug use: No     Sexual activity: Yes     Partners: Female     Birth control/ protection: Condom     Other Topics Concern      Service No     Blood Transfusions No     Caffeine Concern Not Asked     2 bottles of soda a day. Regular     Exercise No     Golfs and walks     Seat Belt Yes     Self-Exams Yes     Parent/Sibling W/ Cabg, Mi Or Angioplasty Before 65f 55m? No     Social History Narrative    2018:         06-    Balanced Diet - Yes    Osteoporosis Preventative measures-  Dairy servings per day: 2    Regular  "Exercise -  No Describe     Dental Exam up - about 1 year ago    Eye Exam - about 3 years ago    Self Testicular Exam -  YES    Do you have any concerns about STD's -  No    Abuse: Current or Past (Physical, Sexual or Emotional)- No    Do you feel safe in your environment - Yes    Guns stored in the home - No    Sunscreen used - No    Seatbelts used - Yes    Lipids - NO    Glucose -  NO    Colon Cancer Screening - Colonoscopy 8-10 years ago at [facility](date completed)    Hemoccults - NO    PSA - NO    Digital Rectal Exam - UNKNOWN    Immunizations reviewed and up to date - unsure of last Td             Allergies   Allergen Reactions     Atorvastatin      myalgia     Patient Active Problem List   Diagnosis     obese bmi over 35     Genital warts     Eczema     Type 2 diabetes mellitus without complication (H)     Hypertension goal BP (blood pressure) < 140/90     Hyperlipidemia with target LDL less than 100     Major depressive disorder, recurrent episode, moderate (H)     Reviewed medications, social history and  past medical and surgical history.    Review of system: for general, respiratory, CVS, GI and psychiatry negative except for noted above.     EXAM:  /62 (BP Location: Right arm, Patient Position: Sitting, Cuff Size: Adult Large)  Pulse 84  Temp 98.8  F (37.1  C) (Oral)  Resp 16  Ht 5' 7\" (1.702 m)  Wt 261 lb (118.4 kg)  SpO2 96%  BMI 40.88 kg/m2  Constitutional: healthy, alert and no distress   Psychiatric: mentation appears normal and affect normal/bright  Cardiovascular: RRR. No murmurs,  Respiratory: negative, Lungs clear. No crackles or wheezing. No tachypnea.   Abdomen: obese    ASSESSMENT / PLAN:  (E11.9) Type 2 diabetes mellitus without complication, without long-term current use of insulin (H)  (primary encounter diagnosis)  Comment: His A1c is under excellent control and we agreed to cut down metformin to every other day and see how he does.  When we recheck his A1c in 6 months or if " is still below 5.5 it would be reasonable to discontinue metformin and see how he does.  Plan: Hemoglobin A1c, metFORMIN (GLUCOPHAGE) 500 MG         tablet, DISCONTINUED: metFORMIN (GLUCOPHAGE)         500 MG tablet            (E66.01) obese bmi over 35  Comment: Body mass index is 40.88 kg/(m^2).  Plan: Continue to work on weight loss.    (I10) Hypertension goal BP (blood pressure) < 140/90  Comment: Patient is tolerating current medication without any major side effects of concerns and current dose seems reasonable too.  Current medication regime is effective. Continue current treatment without any changes.   Plan: lisinopril (PRINIVIL/ZESTRIL) 5 MG tablet             (Z23) Need for prophylactic vaccination with tetanus-diphtheria (TD)  Comment:    Plan: TDAP VACCINE (ADACEL),      ADMIN VACCINE,         FIRST               Follow up in 6 months.

## 2018-06-13 NOTE — MR AVS SNAPSHOT
"              After Visit Summary   6/13/2018    Aaron Mercado    MRN: [id number]           Patient Information     Date Of Birth          1970        Visit Information        Provider Department      6/13/2018 10:00 AM Quoc Flynn MD [facility]        Today's Diagnoses     Type 2 diabetes mellitus without complication, without long-term current use of insulin (H)    -  1    obese bmi over 35        Hypertension goal BP (blood pressure) < 140/90        Need for prophylactic vaccination with tetanus-diphtheria (TD)           Follow-ups after your visit        Who to contact     If you have questions or need follow up information about today's clinic visit or your schedule please contact [facility] directly at [phone].  Normal or non-critical lab and imaging results will be communicated to you by MyChart, letter or phone within 4 business days after the clinic has received the results. If you do not hear from us within 7 days, please contact the clinic through MyChart or phone. If you have a critical or abnormal lab result, we will notify you by phone as soon as possible.  Submit refill requests through [employer] or call your pharmacy and they will forward the refill request to us. Please allow 3 business days for your refill to be completed.          Additional Information About Your Visit        Care EveryWhere ID     This is your Care EveryWhere ID. This could be used by other organizations to access your [facility] medical records  [id number]        Your Vitals Were     Pulse Temperature Respirations Height Pulse Oximetry BMI (Body Mass Index)    84 98.8  F (37.1  C) (Oral) 16 5' 7\" (1.702 m) 96% 40.88 kg/m2       Blood Pressure from Last 3 Encounters:   06/13/18 114/62   05/31/18 (!) 137/95   02/08/18 (!) 137/92    Weight from Last 3 Encounters:   06/13/18 261 lb (118.4 kg)   05/31/18 260 lb 8 oz (118.2 kg)   02/08/18 273 lb (123.8 kg)              We Performed " the Following          ADMIN VACCINE, FIRST     Hemoglobin A1c     TDAP VACCINE (ADACEL)          Today's Medication Changes          These changes are accurate as of 6/13/18  2:52 PM.  If you have any questions, ask your nurse or doctor.               Start taking these medicines.        Dose/Directions    metFORMIN 500 MG tablet   Commonly known as:  GLUCOPHAGE   Used for:  Type 2 diabetes mellitus without complication, without long-term current use of insulin (H)   Started by:  Quoc Flynn MD        Take 1 pill every other day.   Quantity:  45 tablet   Refills:  1            Where to get your medicines      These medications were sent to [facility] [address]  [address], [facility] [address]     Phone:  [phone]     lisinopril 5 MG tablet    metFORMIN 500 MG tablet                Primary Care Provider Office Phone # Fax #    Quoc Flynn [phone]       [address]  [facility] [address]        Equal Access to Services     [facility] : Hadii roger giron hadasho Soalaina, waaxda luqadaha, qaybta kaalmada adeegyada, waxay essiein sameer cooper . So [facility] [phone].    ATENCIÓN: Si habla español, tiene a nails disposición servicios gratuitos de asistencia lingüística. Llame al [phone].    We comply with applicable federal civil rights laws and [place] laws. We do not discriminate on the basis of race, color, national origin, age, disability, sex, sexual orientation, or gender identity.            Thank you!     Thank you for choosing [facility]  for your care. Our goal is always to provide you with excellent care. Hearing back from our patients is one way we can continue to improve our services. Please take a few minutes to complete the written survey that you may receive in the mail after your visit with us. Thank you!             Your Updated Medication List - Protect others around  you: Learn how to safely use, store and throw away your medicines at www.disposemymeds.org.          This list is accurate as of 6/13/18  2:52 PM.  Always use your most recent med list.                   Brand Name Dispense Instructions for use Diagnosis    aspirin 81 MG tablet     90 tablet    Take 1 tablet (81 mg) by mouth daily    Hypertension goal BP (blood pressure) < 140/90       blood glucose monitoring lancets     1 each    1 Box 2 times daily    Type 2 diabetes, HbA1c goal < 7% (H)       blood glucose monitoring test strip    PIETER CONTOUR NEXT    1 Month    1 Month by In Vitro route 2 times daily    Type 2 diabetes, HbA1c goal < 7% (H)       cyclobenzaprine 10 MG tablet    FLEXERIL    30 tablet    Take 0.5-1 tablets (5-10 mg) by mouth 3 times daily as needed for muscle spasms    Acute left-sided low back pain without sciatica       lisinopril 5 MG tablet    PRINIVIL/ZESTRIL    90 tablet    Take 1 tablet (5 mg) by mouth daily    Hypertension goal BP (blood pressure) < 140/90       metFORMIN 500 MG tablet    GLUCOPHAGE    45 tablet    Take 1 pill every other day.    Type 2 diabetes mellitus without complication, without long-term current use of insulin (H)       order for DME     1 each    Equipment being ordered: glucometer    Type 2 diabetes, HbA1c goal < 7% (H)       sildenafil 50 MG tablet    VIAGRA    6 tablet    Take 0.5-1 tablets (25-50 mg) by mouth daily as needed for erectile dysfunction Take 30 min to 4 hours before intercourse.    ED (erectile dysfunction)

## 2018-06-13 NOTE — LETTER
June 13, 2018      Aaron Mercado  [address] [place] [address]        Dear ,    We are writing to inform you of your test results.    Your A1c is still really low. Start taking metformin every other day as you are doing and we will recheck in 6 months.    Resulted Orders   Hemoglobin A1c   Result Value Ref Range    Hemoglobin A1C 5.1 0 - 5.6 %      Comment:      Normal <5.7% Prediabetes 5.7-6.4%  Diabetes 6.5% or higher - adopted from ADA   consensus guidelines.         If you have any questions or concerns, please call the clinic at the number listed above.       Sincerely,        Quoc Flynn MD/nr

## 2018-11-18 ENCOUNTER — APPOINTMENT (OUTPATIENT)
Dept: ULTRASOUND IMAGING | Facility: CLINIC | Age: 48
End: 2018-11-18
Attending: EMERGENCY MEDICINE
Payer: COMMERCIAL

## 2018-11-18 ENCOUNTER — OFFICE VISIT (OUTPATIENT)
Dept: URGENT CARE | Facility: URGENT CARE | Age: 48
End: 2018-11-18
Payer: COMMERCIAL

## 2018-11-18 ENCOUNTER — APPOINTMENT (OUTPATIENT)
Dept: GENERAL RADIOLOGY | Facility: CLINIC | Age: 48
End: 2018-11-18
Attending: EMERGENCY MEDICINE
Payer: COMMERCIAL

## 2018-11-18 ENCOUNTER — HOSPITAL ENCOUNTER (INPATIENT)
Facility: CLINIC | Age: 48
LOS: 6 days | Discharge: HOME | End: 2018-11-24
Attending: EMERGENCY MEDICINE | Admitting: INTERNAL MEDICINE
Payer: COMMERCIAL

## 2018-11-18 VITALS
WEIGHT: 260 LBS | HEART RATE: 130 BPM | SYSTOLIC BLOOD PRESSURE: 159 MMHG | BODY MASS INDEX: 40.72 KG/M2 | TEMPERATURE: 99.4 F | OXYGEN SATURATION: 96 % | DIASTOLIC BLOOD PRESSURE: 101 MMHG

## 2018-11-18 DIAGNOSIS — L03.116 CELLULITIS OF LEFT LOWER EXTREMITY: ICD-10-CM

## 2018-11-18 DIAGNOSIS — I10 HYPERTENSION GOAL BP (BLOOD PRESSURE) < 140/90: Primary | ICD-10-CM

## 2018-11-18 DIAGNOSIS — M79.89 LEFT LEG SWELLING: Primary | ICD-10-CM

## 2018-11-18 DIAGNOSIS — M71.10 SEPTIC BURSITIS: ICD-10-CM

## 2018-11-18 DIAGNOSIS — M79.89 SWELLING OF LIMB: ICD-10-CM

## 2018-11-18 PROBLEM — L03.90 CELLULITIS: Status: ACTIVE | Noted: 2018-11-18

## 2018-11-18 LAB
ANION GAP SERPL CALCULATED.3IONS-SCNC: 11 MMOL/L (ref 3–14)
BASOPHILS # BLD AUTO: 0 10E9/L (ref 0–0.2)
BASOPHILS NFR BLD AUTO: 0.2 %
BUN SERPL-MCNC: 13 MG/DL (ref 7–30)
CALCIUM SERPL-MCNC: 9.2 MG/DL (ref 8.5–10.1)
CHLORIDE SERPL-SCNC: 103 MMOL/L (ref 94–109)
CO2 SERPL-SCNC: 24 MMOL/L (ref 20–32)
CREAT SERPL-MCNC: 0.77 MG/DL (ref 0.66–1.25)
CREAT SERPL-MCNC: 0.77 MG/DL (ref 0.66–1.25)
CRP SERPL-MCNC: 157 MG/L (ref 0–8)
DIFFERENTIAL METHOD BLD: ABNORMAL
EOSINOPHIL # BLD AUTO: 0.1 10E9/L (ref 0–0.7)
EOSINOPHIL NFR BLD AUTO: 0.4 %
ERYTHROCYTE [DISTWIDTH] IN BLOOD BY AUTOMATED COUNT: 12.6 % (ref 10–15)
ERYTHROCYTE [SEDIMENTATION RATE] IN BLOOD BY WESTERGREN METHOD: 16 MM/H (ref 0–15)
GFR SERPL CREATININE-BSD FRML MDRD: >90 ML/MIN/1.7M2
GFR SERPL CREATININE-BSD FRML MDRD: >90 ML/MIN/1.7M2
GLUCOSE BLDC GLUCOMTR-MCNC: 132 MG/DL (ref 70–99)
GLUCOSE BLDC GLUCOMTR-MCNC: 141 MG/DL (ref 70–99)
GLUCOSE SERPL-MCNC: 150 MG/DL (ref 70–99)
HBA1C MFR BLD: 5 % (ref 0–5.6)
HCT VFR BLD AUTO: 44.3 % (ref 40–53)
HGB BLD-MCNC: 15.2 G/DL (ref 13.3–17.7)
IMM GRANULOCYTES # BLD: 0.1 10E9/L (ref 0–0.4)
IMM GRANULOCYTES NFR BLD: 0.3 %
LACTATE BLD-SCNC: 1.4 MMOL/L (ref 0.7–2)
LYMPHOCYTES # BLD AUTO: 1 10E9/L (ref 0.8–5.3)
LYMPHOCYTES NFR BLD AUTO: 5.7 %
MCH RBC QN AUTO: 32.7 PG (ref 26.5–33)
MCHC RBC AUTO-ENTMCNC: 34.3 G/DL (ref 31.5–36.5)
MCV RBC AUTO: 95 FL (ref 78–100)
MONOCYTES # BLD AUTO: 0.7 10E9/L (ref 0–1.3)
MONOCYTES NFR BLD AUTO: 4 %
NEUTROPHILS # BLD AUTO: 15.9 10E9/L (ref 1.6–8.3)
NEUTROPHILS NFR BLD AUTO: 89.4 %
NRBC # BLD AUTO: 0 10*3/UL
NRBC BLD AUTO-RTO: 0 /100
PLATELET # BLD AUTO: 233 10E9/L (ref 150–450)
PLATELET # BLD AUTO: 269 10E9/L (ref 150–450)
POTASSIUM SERPL-SCNC: 3.8 MMOL/L (ref 3.4–5.3)
RBC # BLD AUTO: 4.65 10E12/L (ref 4.4–5.9)
SODIUM SERPL-SCNC: 138 MMOL/L (ref 133–144)
WBC # BLD AUTO: 17.8 10E9/L (ref 4–11)

## 2018-11-18 PROCEDURE — 96361 HYDRATE IV INFUSION ADD-ON: CPT | Performed by: EMERGENCY MEDICINE

## 2018-11-18 PROCEDURE — 96366 THER/PROPH/DIAG IV INF ADDON: CPT | Performed by: EMERGENCY MEDICINE

## 2018-11-18 PROCEDURE — 25000128 H RX IP 250 OP 636: Performed by: HOSPITALIST

## 2018-11-18 PROCEDURE — 83036 HEMOGLOBIN GLYCOSYLATED A1C: CPT | Performed by: HOSPITALIST

## 2018-11-18 PROCEDURE — 36415 COLL VENOUS BLD VENIPUNCTURE: CPT | Performed by: HOSPITALIST

## 2018-11-18 PROCEDURE — 25000132 ZZH RX MED GY IP 250 OP 250 PS 637: Performed by: EMERGENCY MEDICINE

## 2018-11-18 PROCEDURE — 83605 ASSAY OF LACTIC ACID: CPT | Performed by: FAMILY MEDICINE

## 2018-11-18 PROCEDURE — 25000132 ZZH RX MED GY IP 250 OP 250 PS 637: Performed by: HOSPITALIST

## 2018-11-18 PROCEDURE — 80048 BASIC METABOLIC PNL TOTAL CA: CPT | Performed by: EMERGENCY MEDICINE

## 2018-11-18 PROCEDURE — 12000001 ZZH R&B MED SURG/OB UMMC

## 2018-11-18 PROCEDURE — 85652 RBC SED RATE AUTOMATED: CPT | Performed by: EMERGENCY MEDICINE

## 2018-11-18 PROCEDURE — 87040 BLOOD CULTURE FOR BACTERIA: CPT | Performed by: HOSPITALIST

## 2018-11-18 PROCEDURE — 86140 C-REACTIVE PROTEIN: CPT | Performed by: EMERGENCY MEDICINE

## 2018-11-18 PROCEDURE — 99285 EMERGENCY DEPT VISIT HI MDM: CPT | Mod: 25 | Performed by: EMERGENCY MEDICINE

## 2018-11-18 PROCEDURE — 25000128 H RX IP 250 OP 636: Performed by: EMERGENCY MEDICINE

## 2018-11-18 PROCEDURE — 82565 ASSAY OF CREATININE: CPT | Performed by: HOSPITALIST

## 2018-11-18 PROCEDURE — 85025 COMPLETE CBC W/AUTO DIFF WBC: CPT | Performed by: EMERGENCY MEDICINE

## 2018-11-18 PROCEDURE — 99214 OFFICE O/P EST MOD 30 MIN: CPT | Performed by: PHYSICIAN ASSISTANT

## 2018-11-18 PROCEDURE — 85049 AUTOMATED PLATELET COUNT: CPT | Performed by: HOSPITALIST

## 2018-11-18 PROCEDURE — 99285 EMERGENCY DEPT VISIT HI MDM: CPT | Mod: Z6 | Performed by: EMERGENCY MEDICINE

## 2018-11-18 PROCEDURE — 25000131 ZZH RX MED GY IP 250 OP 636 PS 637: Performed by: HOSPITALIST

## 2018-11-18 PROCEDURE — 96365 THER/PROPH/DIAG IV INF INIT: CPT | Performed by: EMERGENCY MEDICINE

## 2018-11-18 PROCEDURE — 00000146 ZZHCL STATISTIC GLUCOSE BY METER IP

## 2018-11-18 PROCEDURE — 99207 ZZC APP CREDIT; MD BILLING SHARED VISIT: CPT | Performed by: HOSPITALIST

## 2018-11-18 PROCEDURE — 93971 EXTREMITY STUDY: CPT | Mod: LT

## 2018-11-18 PROCEDURE — 73560 X-RAY EXAM OF KNEE 1 OR 2: CPT | Mod: LT

## 2018-11-18 RX ORDER — DEXTROSE MONOHYDRATE 25 G/50ML
25-50 INJECTION, SOLUTION INTRAVENOUS
Status: DISCONTINUED | OUTPATIENT
Start: 2018-11-18 | End: 2018-11-24 | Stop reason: HOSPADM

## 2018-11-18 RX ORDER — PROCHLORPERAZINE MALEATE 5 MG
10 TABLET ORAL EVERY 6 HOURS PRN
Status: DISCONTINUED | OUTPATIENT
Start: 2018-11-18 | End: 2018-11-24 | Stop reason: HOSPADM

## 2018-11-18 RX ORDER — LISINOPRIL 5 MG/1
TABLET ORAL
Refills: 0 | COMMUNITY
Start: 2018-05-31 | End: 2018-11-18

## 2018-11-18 RX ORDER — NALOXONE HYDROCHLORIDE 0.4 MG/ML
.1-.4 INJECTION, SOLUTION INTRAMUSCULAR; INTRAVENOUS; SUBCUTANEOUS
Status: DISCONTINUED | OUTPATIENT
Start: 2018-11-18 | End: 2018-11-24 | Stop reason: HOSPADM

## 2018-11-18 RX ORDER — SODIUM CHLORIDE 9 MG/ML
INJECTION, SOLUTION INTRAVENOUS CONTINUOUS
Status: DISCONTINUED | OUTPATIENT
Start: 2018-11-18 | End: 2018-11-21

## 2018-11-18 RX ORDER — IBUPROFEN 600 MG/1
600 TABLET, FILM COATED ORAL ONCE
Status: COMPLETED | OUTPATIENT
Start: 2018-11-18 | End: 2018-11-18

## 2018-11-18 RX ORDER — IBUPROFEN 600 MG/1
600 TABLET, FILM COATED ORAL EVERY 6 HOURS PRN
Status: DISCONTINUED | OUTPATIENT
Start: 2018-11-18 | End: 2018-11-24 | Stop reason: HOSPADM

## 2018-11-18 RX ORDER — ONDANSETRON 2 MG/ML
4 INJECTION INTRAMUSCULAR; INTRAVENOUS EVERY 6 HOURS PRN
Status: DISCONTINUED | OUTPATIENT
Start: 2018-11-18 | End: 2018-11-24 | Stop reason: HOSPADM

## 2018-11-18 RX ORDER — ACETAMINOPHEN 325 MG/1
650 TABLET ORAL EVERY 4 HOURS PRN
Status: DISCONTINUED | OUTPATIENT
Start: 2018-11-18 | End: 2018-11-24 | Stop reason: HOSPADM

## 2018-11-18 RX ORDER — SODIUM CHLORIDE 9 MG/ML
INJECTION, SOLUTION INTRAVENOUS
Status: DISCONTINUED
Start: 2018-11-18 | End: 2018-11-18 | Stop reason: HOSPADM

## 2018-11-18 RX ORDER — PROCHLORPERAZINE 25 MG
25 SUPPOSITORY, RECTAL RECTAL EVERY 12 HOURS PRN
Status: DISCONTINUED | OUTPATIENT
Start: 2018-11-18 | End: 2018-11-24 | Stop reason: HOSPADM

## 2018-11-18 RX ORDER — ACETAMINOPHEN 500 MG
1000 TABLET ORAL ONCE
Status: COMPLETED | OUTPATIENT
Start: 2018-11-18 | End: 2018-11-18

## 2018-11-18 RX ORDER — CEFTRIAXONE 2 G/1
2 INJECTION, POWDER, FOR SOLUTION INTRAMUSCULAR; INTRAVENOUS EVERY 24 HOURS
Status: DISCONTINUED | OUTPATIENT
Start: 2018-11-18 | End: 2018-11-21

## 2018-11-18 RX ORDER — ONDANSETRON 4 MG/1
4 TABLET, ORALLY DISINTEGRATING ORAL EVERY 6 HOURS PRN
Status: DISCONTINUED | OUTPATIENT
Start: 2018-11-18 | End: 2018-11-24 | Stop reason: HOSPADM

## 2018-11-18 RX ORDER — SODIUM CHLORIDE 9 MG/ML
1000 INJECTION, SOLUTION INTRAVENOUS CONTINUOUS
Status: DISCONTINUED | OUTPATIENT
Start: 2018-11-18 | End: 2018-11-21

## 2018-11-18 RX ORDER — NICOTINE POLACRILEX 4 MG
15-30 LOZENGE BUCCAL
Status: DISCONTINUED | OUTPATIENT
Start: 2018-11-18 | End: 2018-11-24 | Stop reason: HOSPADM

## 2018-11-18 RX ORDER — OXYCODONE HYDROCHLORIDE 5 MG/1
5 TABLET ORAL ONCE
Status: COMPLETED | OUTPATIENT
Start: 2018-11-18 | End: 2018-11-18

## 2018-11-18 RX ADMIN — VANCOMYCIN HYDROCHLORIDE 1750 MG: 10 INJECTION, POWDER, LYOPHILIZED, FOR SOLUTION INTRAVENOUS at 22:12

## 2018-11-18 RX ADMIN — ACETAMINOPHEN 1000 MG: 500 TABLET ORAL at 12:43

## 2018-11-18 RX ADMIN — SODIUM CHLORIDE 1000 ML: 9 INJECTION, SOLUTION INTRAVENOUS at 12:44

## 2018-11-18 RX ADMIN — SODIUM CHLORIDE 1000 ML: 9 INJECTION, SOLUTION INTRAVENOUS at 14:27

## 2018-11-18 RX ADMIN — SODIUM CHLORIDE 1000 ML: 9 INJECTION, SOLUTION INTRAVENOUS at 15:59

## 2018-11-18 RX ADMIN — OXYCODONE HYDROCHLORIDE 5 MG: 5 TABLET ORAL at 13:34

## 2018-11-18 RX ADMIN — ACETAMINOPHEN 650 MG: 325 TABLET, FILM COATED ORAL at 20:03

## 2018-11-18 RX ADMIN — VANCOMYCIN HYDROCHLORIDE 1500 MG: 1 INJECTION, POWDER, LYOPHILIZED, FOR SOLUTION INTRAVENOUS at 14:28

## 2018-11-18 RX ADMIN — INSULIN GLARGINE 8 UNITS: 100 INJECTION, SOLUTION SUBCUTANEOUS at 22:12

## 2018-11-18 RX ADMIN — CEFTRIAXONE SODIUM 2 G: 2 INJECTION, POWDER, FOR SOLUTION INTRAMUSCULAR; INTRAVENOUS at 20:03

## 2018-11-18 RX ADMIN — ENOXAPARIN SODIUM 40 MG: 40 INJECTION SUBCUTANEOUS at 20:03

## 2018-11-18 RX ADMIN — IBUPROFEN 600 MG: 600 TABLET ORAL at 12:43

## 2018-11-18 RX ADMIN — SODIUM CHLORIDE, PRESERVATIVE FREE: 5 INJECTION INTRAVENOUS at 19:37

## 2018-11-18 ASSESSMENT — ACTIVITIES OF DAILY LIVING (ADL)
BATHING: 0-->INDEPENDENT
TRANSFERRING: 0-->INDEPENDENT
SWALLOWING: 0-->SWALLOWS FOODS/LIQUIDS WITHOUT DIFFICULTY
DRESS: 0-->INDEPENDENT
AMBULATION: 0-->INDEPENDENT
RETIRED_EATING: 0-->INDEPENDENT
ADLS_ACUITY_SCORE: 10
FALL_HISTORY_WITHIN_LAST_SIX_MONTHS: YES
RETIRED_COMMUNICATION: 0-->UNDERSTANDS/COMMUNICATES WITHOUT DIFFICULTY
TOILETING: 0-->INDEPENDENT
COGNITION: 0 - NO COGNITION ISSUES REPORTED
NUMBER_OF_TIMES_PATIENT_HAS_FALLEN_WITHIN_LAST_SIX_MONTHS: 1

## 2018-11-18 ASSESSMENT — ENCOUNTER SYMPTOMS
CHILLS: 1
VOMITING: 0
NAUSEA: 0
COLOR CHANGE: 1
SHORTNESS OF BREATH: 0

## 2018-11-18 NOTE — IP AVS SNAPSHOT
[facility] [address]    [address]    [facility] [facility] [place] [place] [address]    Phone:  [phone]                                       After Visit Summary   11/18/2018    Aaron Mercado    MRN: [id number]           After Visit Summary Signature Page     I have received my discharge instructions, and my questions have been answered. I have discussed any challenges I see with this plan with the nurse or doctor.    ..........................................................................................................................................  Patient/Patient Representative Signature      ..........................................................................................................................................  Patient Representative Print Name and Relationship to Patient    ..................................................               ................................................  Date                                   Time    ..........................................................................................................................................  Reviewed by Signature/Title    ...................................................              ..............................................  Date                                               Time          22EPIC Rev 08/18

## 2018-11-18 NOTE — PROGRESS NOTES
SUBJECTIVE:  Chief Complaint   Patient presents with     Urgent Care     Pt in clinic to have eval for left knee, lower leg, and ankle pain following fall that occurred 2 weeks ago.     Musculoskeletal Problem     Aaron Mercado is a 48 year old male who presents with a chief complaint of left knee pain. Patient reports that 2 weeks ago, he sustained a mechanical fall. He had immediate L knee and lower leg pain at that time.  3 days ago he noticed a bump on his anterior knee.  Now he has increased swelling, pain and a fever.  He notes that it is more painful with flexion and extension.  He rates the pain as severe, and admits to having numbness and tingling down his leg.  He does have a history of diabetes and hypertension for which he is not taking his medications.      Past Medical History:   Diagnosis Date     Obesity      No current outpatient prescriptions on file.     No current facility-administered medications for this visit.          Social History   Substance Use Topics     Smoking status: Never Smoker     Smokeless tobacco: Never Used      Comment: Non smoke home     Alcohol use Yes      Comment: 1-2 nights out during the week       ROS:  10 Review of systems negative except as stated above.    EXAM:   BP (!) 159/101  Pulse 130  Temp 99.4  F (37.4  C) (Tympanic)  Wt 260 lb (117.9 kg)  SpO2 96%  BMI 40.72 kg/m2  General: WDWN. Patient appears to be in pain.   M/S Exam:Right knee has swelling, erythema and TTP. He had decreased ROM secondary to pain and swelling. NEG Homans sign.  SKIN: Erythematous with edema  NEURO: Normal strength and tone, sensory exam grossly normal, mentation intact and speech normal      ASSESSMENT / PLAN:  1. Left leg swelling  48-year-old male presents for left leg swelling.  On exam, it appears to be L leg cellulitis with tachycardia and in the setting of likely poorly controlled DM Type 2. Less likely DVT or joint infection. He needs more workup than I can offer him in UC,  sending to [place] ED. Patient will go now.     Leanna Osullivan PA-C

## 2018-11-18 NOTE — PHARMACY-ADMISSION MEDICATION HISTORY
Admission medication history for the November 18, 2018 admission is complete.     Interview sources:  Self    Reliability of source: Reliable    Medication compliance: Non-compliant      Changes made to PTA medication list (reason)  Added: None  Deleted: Lisinopril 5 mg, metformin 500 mg  Changed: none    Additional medication history information:   --Non-compliant with medications. Has previously been prescribed lisinopril 5 mg daily (last filled 5/31/18) and metformin 500 mg daily (last filled 2/2018)    Prior to Admission Medication List:  Prior to Admission medications    Not on File       Time spent: 15 minutes    Medication history completed by:   Aaron Camargo, PharmD

## 2018-11-18 NOTE — ED PROVIDER NOTES
[facility] [place]    11/18/18       History     Chief Complaint   Patient presents with     Leg Pain     wears brace for left knee; tripped one week ago and landed on left knee and right shoulder; two days ago noticed a tender bump just below left knee cap; today, noted swelling, redness and burning from just above knee to ankle     The history is provided by the patient.     Aaron Mercado is a 48 year old male who presents with a red hot swollen left lower extremity.  Patient does note trauma several days ago.  He states that he tripped and fell on that knee, so the following day when he developed an area of redness below the knee, he thought this was secondary to his fall.  However, over the last several days the redness has spread to affect his entire lower extremity and is starting to track above the knee.  He is also endorsed some subjective fevers and chills, but has not documented a temperature.  He denies any nausea or vomiting.  He denies any chest pain or shortness of breath.  Patient has a history of diabetes mellitus, he reports his glucose has been well controlled recently.  He denies history of soft tissue infection or cellulitis.      This part of the medical record was transcribed by Francesco Velarde Medical Scribe, from a dictation done by Jerardo Rubio MD.       I have reviewed the Medications, Allergies, Past Medical and Surgical History, and Social History in the [facility] system.    Past Medical History:   Diagnosis Date     Diabetes (H)      Hypertension      Obesity        Past Surgical History:   Procedure Laterality Date     C ORAL SURGERY PROCEDURE  2000    [place] teeth extraction     LASIK 2003       Family History   Problem Relation Age of Onset     Diabetes Mother      Family History Negative Father      Family History Negative Sister      Family History Negative Sister      Family History Negative Brother      Family History Negative Brother      Family  History Negative Brother      Family History Negative Sister      Family History Negative Son        Social History   Substance Use Topics     Smoking status: Never Smoker     Smokeless tobacco: Never Used      Comment: Non smoke home     Alcohol use Yes      Comment: 1-2 nights out during the week       Current Facility-Administered Medications   Medication     0.9% sodium chloride BOLUS    Followed by     sodium chloride 0.9% infusion     sodium chloride 0.9% infusion     vancomycin (VANCOCIN) 1,500 mg in sodium chloride 0.9 % 250 mL intermittent infusion     No current outpatient prescriptions on file.        Allergies   Allergen Reactions     Atorvastatin      myalgia         Review of Systems   Constitutional: Positive for chills.   Respiratory: Negative for shortness of breath.    Cardiovascular: Positive for leg swelling (left). Negative for chest pain.   Gastrointestinal: Negative for nausea and vomiting.   Skin: Positive for color change (redness of left leg).   All other systems reviewed and are negative.      Physical Exam   BP: (!) 154/98  Pulse: 127  Temp: 100.8  F (38.2  C)  Resp: 20  Weight: 119.3 kg (263 lb 1.6 oz)  SpO2: 96 %      Physical Exam   General: awake, alert, NAD  Head: normal cephalic  HEENT: pupils equal, conjugate gaze in tact  Neck: Supple  CV: regular rate and rhythm without murmur  Lungs: clear to ascultation  Abd: soft, non-tender, no guarding, no peritoneal signs  EXT: Right lower extremity has erythema, warmth, and tenderness from above the knee extending all the way down to the ankle.  Joint effusion noted.  Patient is comfortable with knee extension, has near full flexion but this does elicit pain and does limit full range of motion.  Patient appears to have maximal tenderness over his bursa.   Neuro: awake, answers questions appropriately. No focal deficits noted         ED Course     ED Course     Medications   0.9% sodium chloride BOLUS (0 mLs Intravenous Stopped 11/18/18  9213)     Followed by   sodium chloride 0.9% infusion (1,000 mLs Intravenous New Bag 11/18/18 1427)   vancomycin (VANCOCIN) 1,500 mg in sodium chloride 0.9 % 250 mL intermittent infusion (1,500 mg Intravenous New Bag 11/18/18 1428)   0.9% sodium chloride BOLUS (not administered)     Followed by   sodium chloride 0.9% infusion (not administered)   ibuprofen (ADVIL/MOTRIN) tablet 600 mg (600 mg Oral Given 11/18/18 1243)   acetaminophen (TYLENOL) tablet 1,000 mg (1,000 mg Oral Given 11/18/18 1243)   oxyCODONE IR (ROXICODONE) tablet 5 mg (5 mg Oral Given 11/18/18 1334)     Results for orders placed or performed during the hospital encounter of 11/18/18 (from the past 24 hour(s))   Glucose by meter   Result Value Ref Range    Glucose 141 (H) 70 - 99 mg/dL   Lactic acid whole blood   Result Value Ref Range    Lactic Acid 1.4 0.7 - 2.0 mmol/L   CBC with platelets differential   Result Value Ref Range    WBC 17.8 (H) 4.0 - 11.0 10e9/L    RBC Count 4.65 4.4 - 5.9 10e12/L    Hemoglobin 15.2 13.3 - 17.7 g/dL    Hematocrit 44.3 40.0 - 53.0 %    MCV 95 78 - 100 fl    MCH 32.7 26.5 - 33.0 pg    MCHC 34.3 31.5 - 36.5 g/dL    RDW 12.6 10.0 - 15.0 %    Platelet Count 269 150 - 450 10e9/L    Diff Method Automated Method     % Neutrophils 89.4 %    % Lymphocytes 5.7 %    % Monocytes 4.0 %    % Eosinophils 0.4 %    % Basophils 0.2 %    % Immature Granulocytes 0.3 %    Nucleated RBCs 0 0 /100    Absolute Neutrophil 15.9 (H) 1.6 - 8.3 10e9/L    Absolute Lymphocytes 1.0 0.8 - 5.3 10e9/L    Absolute Monocytes 0.7 0.0 - 1.3 10e9/L    Absolute Eosinophils 0.1 0.0 - 0.7 10e9/L    Absolute Basophils 0.0 0.0 - 0.2 10e9/L    Abs Immature Granulocytes 0.1 0 - 0.4 10e9/L    Absolute Nucleated RBC 0.0    Basic metabolic panel   Result Value Ref Range    Sodium 138 133 - 144 mmol/L    Potassium 3.8 3.4 - 5.3 mmol/L    Chloride 103 94 - 109 mmol/L    Carbon Dioxide 24 20 - 32 mmol/L    Anion Gap 11 3 - 14 mmol/L    Glucose 150 (H) 70 - 99 mg/dL     Urea Nitrogen 13 7 - 30 mg/dL    Creatinine 0.77 0.66 - 1.25 mg/dL    GFR Estimate >90 >60 mL/min/1.7m2    GFR Estimate If Black >90 >60 mL/min/1.7m2    Calcium 9.2 8.5 - 10.1 mg/dL   CRP inflammation   Result Value Ref Range    CRP Inflammation 157.0 (H) 0.0 - 8.0 mg/L   Erythrocyte sedimentation rate auto   Result Value Ref Range    Sed Rate 16 (H) 0 - 15 mm/h   XR Knee Left 1/2 Views    Narrative    LEFT KNEE ONE TO TWO VIEWS 11/18/2018 1:52 PM     HISTORY: Pain. Redness. Swelling after fall.     COMPARISON: None.      Impression    IMPRESSION: Moderate narrowing medial joint space. Mild degenerative  marginal bony spurring at the patellofemoral joint. No acute bony  abnormality. Anterior subcutaneous edema.   US Lower Extremity Venous Duplex Left    Narrative    VENOUS DOPPLER LEFT LOWER EXTREMITY 11/18/2018 2:05 PM    HISTORY: Left lower extremity pain, swelling and redness.    COMPARISON: None.    FINDINGS: Color-flow imaging and Doppler waveform spectral analysis  were utilized. There is normal compressibility and spontaneous flow  throughout the left common femoral, superficial femoral, popliteal and  visualized calf veins.      Impression    IMPRESSION: No evidence for deep venous thrombosis.       Procedures             Labs Ordered and Resulted from Time of ED Arrival Up to the Time of Departure from the ED   CBC WITH PLATELETS DIFFERENTIAL - Abnormal; Notable for the following:        Result Value    WBC 17.8 (*)     Absolute Neutrophil 15.9 (*)     All other components within normal limits   BASIC METABOLIC PANEL - Abnormal; Notable for the following:     Glucose 150 (*)     All other components within normal limits   GLUCOSE BY METER - Abnormal; Notable for the following:     Glucose 141 (*)     All other components within normal limits   CRP INFLAMMATION - Abnormal; Notable for the following:     CRP Inflammation 157.0 (*)     All other components within normal limits   ERYTHROCYTE SEDIMENTATION RATE  AUTO - Abnormal; Notable for the following:     Sed Rate 16 (*)     All other components within normal limits   GLUCOSE MONITOR NURSING POCT   LACTIC ACID WHOLE BLOOD   PERIPHERAL IV CATHETER   NURSING DRAW AND HOLD            Assessments & Plan (with Medical Decision Making)   Aaron is a 48-year-old male who presents with pain, swelling, erythema, and fevers of his left lower extremity.  Strong suspicion this represents left lower extremity cellulitis with or without a septic knee or bursitis.  Interestingly patient states symptoms started as a hot swollen area just below the knee following a fall.  X-ray showed no acute abnormality.  Patient has knee effusion and limited range of motion of the knee and pain with putting weight on the knee.  I did consider this could represent a septic joint however the entire joint is diffusely erythematous and cannot tap without going through a cellulitic skin rate I consulted orthopedics as I want to start antibiotics, they thought this sounded less likely like a septic joint, the agreed with proceeding with antibiotics, and they can be consulted and follow along the hospital service.    Patient's DVT ultrasound negative.  Patient has an elevated white count with a left shift.  Elevated CRP elevated sed rate.  He was given Tylenol for fever, oxycodone for pain control.  2 L of IV fluid for tachycardia.    Patient remained otherwise stable throughout the ER course, tachycardia improved with IVF and fever control.  Normal blood pressures.  Negative lactic acid.    I have reviewed the nursing notes.    I have reviewed the findings, diagnosis, plan and need for follow up with the patient.    New Prescriptions    No medications on file       Final diagnoses:   Cellulitis of left lower extremity   Septic bursitis       11/18/2018   [facility] [place], EMERGENCY DEPARTMENT     Jerardo Rubio MD  11/19/18 2922

## 2018-11-18 NOTE — MR AVS SNAPSHOT
After Visit Summary   11/18/2018    Aaron Mercado    MRN: [id number]           Patient Information     Date Of Birth          1970        Visit Information        Provider Department      11/18/2018 10:35 AM Leanna Osullivan PA-C [facility]        Today's Diagnoses     Left leg swelling    -  1       Follow-ups after your visit        Who to contact     If you have questions or need follow up information about today's clinic visit or your schedule please contact [facility] directly at [phone].  Normal or non-critical lab and imaging results will be communicated to you by MyChart, letter or phone within 4 business days after the clinic has received the results. If you do not hear from us within 7 days, please contact the clinic through MyChart or phone. If you have a critical or abnormal lab result, we will notify you by phone as soon as possible.  Submit refill requests through [employer] or call your pharmacy and they will forward the refill request to us. Please allow 3 business days for your refill to be completed.          Additional Information About Your Visit        Care EveryWhere ID     This is your Care EveryWhere ID. This could be used by other organizations to access your [facility] medical records  [id number]        Your Vitals Were     Pulse Temperature Pulse Oximetry BMI (Body Mass Index)          130 99.4  F (37.4  C) (Tympanic) 96% 40.72 kg/m2         Blood Pressure from Last 3 Encounters:   11/18/18 (!) 159/101   06/13/18 114/62   05/31/18 (!) 137/95    Weight from Last 3 Encounters:   11/18/18 260 lb (117.9 kg)   06/13/18 261 lb (118.4 kg)   05/31/18 260 lb 8 oz (118.2 kg)              Today, you had the following     No orders found for display         Today's Medication Changes          These changes are accurate as of 11/18/18 12:11 PM.  If you have any questions, ask your nurse or doctor.               These medicines  have changed or have updated prescriptions.        Dose/Directions    lisinopril 5 MG tablet   Commonly known as:  PRINIVIL/ZESTRIL   This may have changed:  Another medication with the same name was removed. Continue taking this medication, and follow the directions you see here.   Changed by:  Leanna Osullivan PA-C        Refills:  0       metFORMIN 500 MG tablet   Commonly known as:  GLUCOPHAGE   This may have changed:  Another medication with the same name was removed. Continue taking this medication, and follow the directions you see here.   Changed by:  Leanna Osullivan PA-C        Refills:  0         Stop taking these medicines if you haven't already. Please contact your care team if you have questions.     aspirin 81 MG tablet   Stopped by:  Leanna Osullivan PA-C           blood glucose monitoring lancets   Stopped by:  Leanna Osullivan PA-C           blood glucose monitoring test strip   Commonly known as:  PIETER CONTOUR NEXT   Stopped by:  Leanna Osullivan PA-C           cyclobenzaprine 10 MG tablet   Commonly known as:  FLEXERIL   Stopped by:  Leanna Osullivan PA-C           order for DME   Stopped by:  Leanna Osullivan PA-C           sildenafil 50 MG tablet   Commonly known as:  VIAGRA   Stopped by:  Leanna Osullivan PA-C                    Primary Care Provider Office Phone # Fax #    [place] Trevin Flynn [phone]       [facility] [address] [place], [place] [id number]        Equal Access to Services     [facility] Hospital[facility]LLA AH: Hadii aad ku hadasho Soomaali, waaxda luqadaha, qaybta kaalmada adeegyada, josephine manzo. So [facility] [phone].    ATENCIÓN: Si habla español, tiene a nails disposición servicios gratuitos de asistencia lingüística. Llame al [phone].    We comply with applicable federal civil rights laws and [place] laws. We do not discriminate on the basis of race, color, national origin, age, disability, sex,  sexual orientation, or gender identity.            Thank you!     Thank you for choosing [facility]  for your care. Our goal is always to provide you with excellent care. Hearing back from our patients is one way we can continue to improve our services. Please take a few minutes to complete the written survey that you may receive in the mail after your visit with us. Thank you!             Your Updated Medication List - Protect others around you: Learn how to safely use, store and throw away your medicines at www.disposemymeds.org.          This list is accurate as of 11/18/18 12:11 PM.  Always use your most recent med list.                   Brand Name Dispense Instructions for use Diagnosis    lisinopril 5 MG tablet    PRINIVIL/ZESTRIL          metFORMIN 500 MG tablet    GLUCOPHAGE

## 2018-11-18 NOTE — IP AVS SNAPSHOT
MRN:[id number]                      After Visit Summary   11/18/2018    Aaron Mercado    MRN: [id number]           Thank you!     Thank you for choosing [facility] for your care. Our goal is always to provide you with excellent care. Hearing back from our patients is one way we can continue to improve our services. Please take a few minutes to complete the written survey that you may receive in the mail after you visit with us. Thank you!        Patient Information     Date Of Birth          1970        Designated Caregiver       Most Recent Value    Caregiver    Will someone help with your care after discharge? no      About your hospital stay     You were admitted on:  November 18, 2018 You last received care in the:  [facility]    You were discharged on:  November 24, 2018        Reason for your hospital stay       Left Lower extremity cellulitis. Treated with iv Antibiotics inpatient. Discharging on oral antibiotics to complete 2 weeks course.     Hypertension: started on Lisinopril 5 mg daily. Can be titrated up as needed. Goal BP < 130/80.                  Who to Call     For medical emergencies, please call 911.  For non-urgent questions about your medical care, please call your primary care provider or clinic, [phone]          Attending Provider     Provider Specialty    Jerardo Rubio MD Emergency Medicine    [facility], Kathya Abrams MD Internal Medicine       Primary Care Provider Office Phone # Fax #    Quoc Trevin Flynn [phone]      After Care Instructions     Activity       Your activity upon discharge: activity as tolerated; keep Left Leg elevated at rest.            Diet       Follow this diet upon discharge: Orders Placed This Encounter      Regular Diet Adult            Monitor and record       blood glucose 4 times a day, before meals and at bedtime                  Follow-up Appointments     Adult [facility] Follow-up and recommended  labs and tests       Follow up with primary care provider, Quoc Flynn MD, within 7 days for hospital follow- up.  The following labs/tests are recommended: CBC, BMP, CRP.     Follow up with Orthopedic surgery (non operative) in 2 weeks (for Left LE cellulitis including around knee. R shoulder pain)       Appointments on [place] and/or [facility] (with [facility] or [facility] provider or service). Call [phone] if you haven't heard regarding these appointments within 7 days of discharge.                  Pending Results     No orders found from 11/16/2018 to 11/19/2018.            Statement of Approval     Ordered          11/24/18 9363  I have reviewed and agree with all the recommendations and orders detailed in this document.  EFFECTIVE NOW     Approved and electronically signed by:  Ashkan Carmen MD             Admission Information     Date & Time Provider Department Dept. Phone    11/18/2018 Kathya Borrego MD [facility] [phone]      Your Vitals Were     Blood Pressure Pulse Temperature Respirations Weight Pulse Oximetry    139/97 (BP Location: Left arm) 82 96.3  F (35.7  C) (Oral) 18 119.3 kg (263 lb 1.6 oz) 97%    BMI (Body Mass Index)                   41.21 kg/m2           Care EveryWhere ID     This is your Care EveryWhere ID. This could be used by other organizations to access your [facility] medical records  [id number]        Equal Access to Services     BRANDY SAMSON AH: Hadii roger Charles, waaxda luqadaha, qaybta kaalmada kenyetta, josephine manzo. So [facility] [phone].    ATENCIÓN: Si habla español, tiene a nails disposición servicios gratuitos de asistencia lingüística. Llame al [phone].    We comply with applicable federal civil rights laws and [place] laws. We do not discriminate on the basis of race, color, national origin, age, disability, sex, sexual orientation, or gender identity.               Review of your medicines       START taking        Dose / Directions    acetaminophen 325 MG tablet   Commonly known as:  TYLENOL   Used for:  Cellulitis of left lower extremity        Dose:  650 mg   Take 2 tablets (650 mg) by mouth every 4 hours as needed for mild pain   Quantity:  100 tablet   Refills:  0       cephALEXin 500 MG capsule   Commonly known as:  KEFLEX   Used for:  Cellulitis of left lower extremity        Dose:  500 mg   Take 1 capsule (500 mg) by mouth 3 times daily for 8 days   Quantity:  24 capsule   Refills:  0       ibuprofen 600 MG tablet   Commonly known as:  ADVIL/MOTRIN   Used for:  Cellulitis of left lower extremity        Dose:  600 mg   Take 1 tablet (600 mg) by mouth every 6 hours as needed for other (mild pain)   Quantity:  40 tablet   Refills:  0       lisinopril 5 MG tablet   Commonly known as:  PRINIVIL/ZESTRIL   Used for:  Hypertension goal BP (blood pressure) < 140/90        Dose:  5 mg   Start taking on:  11/25/2018   Take 1 tablet (5 mg) by mouth daily   Quantity:  30 tablet   Refills:  3       sulfamethoxazole-trimethoprim 800-160 MG per tablet   Commonly known as:  BACTRIM DS/SEPTRA DS   Used for:  Cellulitis of left lower extremity        Dose:  1 tablet   Take 1 tablet by mouth 2 times daily for 8 days   Quantity:  16 tablet   Refills:  0            Where to get your medicines      These medications were sent to [facility] [place], [place] - [address]  [address] [address]     Phone:  [phone]     acetaminophen 325 MG tablet    cephALEXin 500 MG capsule    ibuprofen 600 MG tablet    lisinopril 5 MG tablet    sulfamethoxazole-trimethoprim 800-160 MG per tablet                Protect others around you: Learn how to safely use, store and throw away your medicines at www.disposemymeds.org.        ANTIBIOTIC INSTRUCTION     You've Been Prescribed an Antibiotic - Now What?  Your healthcare team thinks that you or your loved one might have an infection.  Some infections can be treated with antibiotics, which are powerful, life-saving drugs. Like all medications, antibiotics have side effects and should only be used when necessary. There are some important things you should know about your antibiotic treatment.      Your healthcare team may run tests before you start taking an antibiotic.    Your team may take samples (e.g., from your blood, urine or other areas) to run tests to look for bacteria. These test can be important to determine if you need an antibiotic at all and, if you do, which antibiotic will work best.      Within a few days, your healthcare team might change or even stop your antibiotic.    Your team may start you on an antibiotic while they are working to find out what is making you sick.    Your team might change your antibiotic because test results show that a different antibiotic would be better to treat your infection.    In some cases, once your team has more information, they learn that you do not need an antibiotic at all. They may find out that you don't have an infection, or that the antibiotic you're taking won't work against your infection. For example, an infection caused by a virus can't be treated with antibiotics. Staying on an antibiotic when you don't need it is more likely to be harmful than helpful.      You may experience side effects from your antibiotic.    Like all medications, antibiotics have side effects. Some of these can be serious.    Let you healthcare team know if you have any known allergies when you are admitted to the hospital.    One significant side effect of nearly all antibiotics is the risk of severe and sometimes deadly diarrhea caused by Clostridium difficile (C. Difficile). This occurs when a person takes antibiotics because some good germs are destroyed. Antibiotic use allows C. diificile to take over, putting patients at high risk for this serious infection.    As a patient or caregiver, it is important to  understand your or your loved one's antibiotic treatment. It is especially important for caregivers to speak up when patients can't speak for themselves. Here are some important questions to ask your healthcare team.    What infection is this antibiotic treating and how do you know I have that infection?    What side effects might occur from this antibiotic?    How long will I need to take this antibiotic?    Is it safe to take this antibiotic with other medications or supplements (e.g., vitamins) that I am taking?     Are there any special directions I need to know about taking this antibiotic? For example, should I take it with food?    How will I be monitored to know whether my infection is responding to the antibiotic?    What tests may help to make sure the right antibiotic is prescribed for me?      Information provided by:  www.cdc.gov/getsmart  U.S. Department of Health and Human Services  Centers for disease Control and Prevention  National Center for Emerging and Zoonotic Infectious Diseases  Division of Healthcare Quality Promotion             Medication List: This is a list of all your medications and when to take them. Check marks below indicate your daily home schedule. Keep this list as a reference.      Medications           Morning Afternoon Evening Bedtime As Needed    acetaminophen 325 MG tablet   Commonly known as:  TYLENOL   Take 2 tablets (650 mg) by mouth every 4 hours as needed for mild pain   Last time this was given:  650 mg on 11/19/2018  8:10 AM                                   cephALEXin 500 MG capsule   Commonly known as:  KEFLEX   Take 1 capsule (500 mg) by mouth 3 times daily for 8 days                                         ibuprofen 600 MG tablet   Commonly known as:  ADVIL/MOTRIN   Take 1 tablet (600 mg) by mouth every 6 hours as needed for other (mild pain)   Last time this was given:  600 mg on 11/24/2018  7:33 AM                                   lisinopril 5 MG tablet    Commonly known as:  PRINIVIL/ZESTRIL   Take 1 tablet (5 mg) by mouth daily   Start taking on:  11/25/2018   Last time this was given:  5 mg on 11/24/2018  7:56 AM                                   sulfamethoxazole-trimethoprim 800-160 MG per tablet   Commonly known as:  BACTRIM DS/SEPTRA DS   Take 1 tablet by mouth 2 times daily for 8 days

## 2018-11-18 NOTE — ED NOTES
[facility], [place]   ED Nurse to Floor Handoff     Aaron Mercado is a 48 year old male who speaks English and lives alone,  in a home  They arrived in the ED by car from clinic    ED Chief Complaint: Leg Pain (wears brace for left knee; tripped one week ago and landed on left knee and right shoulder; two days ago noticed a tender bump just below left knee cap; today, noted swelling, redness and burning from just above knee to ankle)    ED Dx;   Final diagnoses:   Cellulitis of left lower extremity   Septic bursitis         Needed?: No    Allergies:   Allergies   Allergen Reactions     Atorvastatin      myalgia   .  Past Medical Hx:   Past Medical History:   Diagnosis Date     Diabetes (H)      Hypertension      Obesity       Baseline Mental status: WDL  Current Mental Status changes: at basesline    Infection present or suspected this encounter: Yes, cellulitis   Sepsis suspected: No  Isolation type: No active isolations     Activity level - Baseline/Home:  Independent  Activity Level - Current:   Independent    Bariatric equipment needed?: No    In the ED these meds were given:   Medications   0.9% sodium chloride BOLUS (0 mLs Intravenous Stopped 11/18/18 1353)     Followed by   sodium chloride 0.9% infusion (1,000 mLs Intravenous New Bag 11/18/18 1427)   0.9% sodium chloride BOLUS (1,000 mLs Intravenous New Bag 11/18/18 1559)     Followed by   sodium chloride 0.9% infusion (not administered)   ibuprofen (ADVIL/MOTRIN) tablet 600 mg (600 mg Oral Given 11/18/18 1243)   acetaminophen (TYLENOL) tablet 1,000 mg (1,000 mg Oral Given 11/18/18 1243)   oxyCODONE IR (ROXICODONE) tablet 5 mg (5 mg Oral Given 11/18/18 1334)   vancomycin (VANCOCIN) 1,500 mg in sodium chloride 0.9 % 250 mL intermittent infusion (1,500 mg Intravenous New Bag 11/18/18 1428)       Drips running?  Yes    Home pump  No    Current LDAs  Peripheral IV 11/18/18 Left Upper forearm (Active)   Site Assessment WDL  11/18/2018 12:45 PM   Line Status Infusing 11/18/2018 12:45 PM   Phlebitis Scale 0-->no symptoms 11/18/2018 12:45 PM   Infiltration Scale 0 11/18/2018 12:45 PM   Dressing Intervention New dressing  11/18/2018 12:45 PM   Number of days:0       Labs results:   Labs Ordered and Resulted from Time of ED Arrival Up to the Time of Departure from the ED   CBC WITH PLATELETS DIFFERENTIAL - Abnormal; Notable for the following:        Result Value    WBC 17.8 (*)     Absolute Neutrophil 15.9 (*)     All other components within normal limits   BASIC METABOLIC PANEL - Abnormal; Notable for the following:     Glucose 150 (*)     All other components within normal limits   GLUCOSE BY METER - Abnormal; Notable for the following:     Glucose 141 (*)     All other components within normal limits   CRP INFLAMMATION - Abnormal; Notable for the following:     CRP Inflammation 157.0 (*)     All other components within normal limits   ERYTHROCYTE SEDIMENTATION RATE AUTO - Abnormal; Notable for the following:     Sed Rate 16 (*)     All other components within normal limits   GLUCOSE MONITOR NURSING POCT   LACTIC ACID WHOLE BLOOD   PERIPHERAL IV CATHETER   NURSING DRAW AND HOLD       Imaging Studies:   Recent Results (from the past 24 hour(s))   XR Knee Left 1/2 Views    Narrative    LEFT KNEE ONE TO TWO VIEWS 11/18/2018 1:52 PM     HISTORY: Pain. Redness. Swelling after fall.     COMPARISON: None.      Impression    IMPRESSION: Moderate narrowing medial joint space. Mild degenerative  marginal bony spurring at the patellofemoral joint. No acute bony  abnormality. Anterior subcutaneous edema.   US Lower Extremity Venous Duplex Left    Narrative    VENOUS DOPPLER LEFT LOWER EXTREMITY 11/18/2018 2:05 PM    HISTORY: Left lower extremity pain, swelling and redness.    COMPARISON: None.    FINDINGS: Color-flow imaging and Doppler waveform spectral analysis  were utilized. There is normal compressibility and spontaneous flow  throughout the  left common femoral, superficial femoral, popliteal and  visualized calf veins.      Impression    IMPRESSION: No evidence for deep venous thrombosis.       Recent vital signs:   /88  Pulse 117  Temp 98.9  F (37.2  C) (Oral)  Resp 20  Wt 119.3 kg (263 lb 1.6 oz)  SpO2 100%  BMI 41.21 kg/m2    Cardiac Rhythm: Normal Sinus and Other, N/A not hooked up to monitor   Pt needs tele? No  Skin/wound Issues: Patien has cellulitis of left leg, outlined and marked.     Code Status: Full Code    Pain control: good    Nausea control: pt had none    Abnormal labs/tests/findings requiring intervention: See labs    Family present during ED course? No   Family Comments/Social Situation comments:     Tasks needing completion: None    Saskia Crsos RN  [facility]--   [phone] [place] ED  [phone] [facility]

## 2018-11-18 NOTE — PHARMACY-VANCOMYCIN DOSING SERVICE
Pharmacy Vancomycin Initial Note  Date of Service 2018  Patient's  1970  48 year old, male    Indication: Skin and Soft Tissue Infection    Current estimated CrCl = Estimated Creatinine Clearance: 148.2 mL/min (based on Cr of 0.77).    Creatinine for last 3 days  2018: 12:42 PM Creatinine 0.77 mg/dL    Recent Vancomycin Level(s) for last 3 days  No results found for requested labs within last 72 hours.      Vancomycin IV Administrations (past 72 hours)      No vancomycin orders with administrations in past 72 hours.                Nephrotoxins and other renal medications (Future)    Start     Dose/Rate Route Frequency Ordered Stop    18 1346  vancomycin (VANCOCIN) 1,500 mg in sodium chloride 0.9 % 250 mL intermittent infusion      1,500 mg  over 90 Minutes Intravenous ONCE 18 1346            Contrast Orders - past 72 hours     None                Plan:  1.  Start vancomycin  1500 mg (~17 mg/kg adjbw) IV x 1 in ED   2.  Goal Trough Level: 10-15 mg/L   3.  Pharmacy will check trough levels as appropriate in 1-3 Days.    4. Serum creatinine levels will be ordered a minimum of twice weekly.      Aaron Camargo, PharmD

## 2018-11-19 LAB
CREAT SERPL-MCNC: 0.65 MG/DL (ref 0.66–1.25)
ERYTHROCYTE [DISTWIDTH] IN BLOOD BY AUTOMATED COUNT: 12.7 % (ref 10–15)
GFR SERPL CREATININE-BSD FRML MDRD: >90 ML/MIN/1.7M2
GLUCOSE BLDC GLUCOMTR-MCNC: 102 MG/DL (ref 70–99)
GLUCOSE BLDC GLUCOMTR-MCNC: 103 MG/DL (ref 70–99)
GLUCOSE BLDC GLUCOMTR-MCNC: 110 MG/DL (ref 70–99)
GLUCOSE BLDC GLUCOMTR-MCNC: 82 MG/DL (ref 70–99)
GLUCOSE BLDC GLUCOMTR-MCNC: 98 MG/DL (ref 70–99)
HCT VFR BLD AUTO: 40.4 % (ref 40–53)
HGB BLD-MCNC: 13.7 G/DL (ref 13.3–17.7)
LACTATE BLD-SCNC: 0.6 MMOL/L (ref 0.7–2)
MCH RBC QN AUTO: 32.6 PG (ref 26.5–33)
MCHC RBC AUTO-ENTMCNC: 33.9 G/DL (ref 31.5–36.5)
MCV RBC AUTO: 96 FL (ref 78–100)
PLATELET # BLD AUTO: 260 10E9/L (ref 150–450)
RBC # BLD AUTO: 4.2 10E12/L (ref 4.4–5.9)
WBC # BLD AUTO: 14.1 10E9/L (ref 4–11)

## 2018-11-19 PROCEDURE — 99207 ZZC DOWN CODE DUE TO INITIAL EXAM: CPT | Performed by: INTERNAL MEDICINE

## 2018-11-19 PROCEDURE — 25000128 H RX IP 250 OP 636: Performed by: HOSPITALIST

## 2018-11-19 PROCEDURE — 00000146 ZZHCL STATISTIC GLUCOSE BY METER IP

## 2018-11-19 PROCEDURE — 99221 1ST HOSP IP/OBS SF/LOW 40: CPT | Mod: AI | Performed by: INTERNAL MEDICINE

## 2018-11-19 PROCEDURE — 85027 COMPLETE CBC AUTOMATED: CPT | Performed by: HOSPITALIST

## 2018-11-19 PROCEDURE — 82565 ASSAY OF CREATININE: CPT | Performed by: HOSPITALIST

## 2018-11-19 PROCEDURE — 83605 ASSAY OF LACTIC ACID: CPT | Performed by: HOSPITALIST

## 2018-11-19 PROCEDURE — 25000132 ZZH RX MED GY IP 250 OP 250 PS 637: Performed by: INTERNAL MEDICINE

## 2018-11-19 PROCEDURE — 36415 COLL VENOUS BLD VENIPUNCTURE: CPT | Performed by: HOSPITALIST

## 2018-11-19 PROCEDURE — 12000001 ZZH R&B MED SURG/OB UMMC

## 2018-11-19 PROCEDURE — 25000132 ZZH RX MED GY IP 250 OP 250 PS 637: Performed by: HOSPITALIST

## 2018-11-19 RX ORDER — TRAMADOL HYDROCHLORIDE 50 MG/1
50 TABLET ORAL
Status: COMPLETED | OUTPATIENT
Start: 2018-11-19 | End: 2018-11-19

## 2018-11-19 RX ORDER — TRAMADOL HYDROCHLORIDE 50 MG/1
50 TABLET ORAL EVERY 6 HOURS PRN
Status: COMPLETED | OUTPATIENT
Start: 2018-11-19 | End: 2018-11-19

## 2018-11-19 RX ADMIN — TRAMADOL HYDROCHLORIDE 50 MG: 50 TABLET, COATED ORAL at 01:54

## 2018-11-19 RX ADMIN — IBUPROFEN 600 MG: 600 TABLET, FILM COATED ORAL at 15:09

## 2018-11-19 RX ADMIN — CEFTRIAXONE SODIUM 2 G: 2 INJECTION, POWDER, FOR SOLUTION INTRAMUSCULAR; INTRAVENOUS at 19:30

## 2018-11-19 RX ADMIN — ACETAMINOPHEN 650 MG: 325 TABLET, FILM COATED ORAL at 08:10

## 2018-11-19 RX ADMIN — VANCOMYCIN HYDROCHLORIDE 1750 MG: 10 INJECTION, POWDER, LYOPHILIZED, FOR SOLUTION INTRAVENOUS at 11:26

## 2018-11-19 RX ADMIN — INSULIN GLARGINE 8 UNITS: 100 INJECTION, SOLUTION SUBCUTANEOUS at 22:56

## 2018-11-19 RX ADMIN — ENOXAPARIN SODIUM 40 MG: 40 INJECTION SUBCUTANEOUS at 19:30

## 2018-11-19 RX ADMIN — VANCOMYCIN HYDROCHLORIDE 1750 MG: 10 INJECTION, POWDER, LYOPHILIZED, FOR SOLUTION INTRAVENOUS at 22:57

## 2018-11-19 RX ADMIN — TRAMADOL HYDROCHLORIDE 50 MG: 50 TABLET, COATED ORAL at 14:00

## 2018-11-19 RX ADMIN — SODIUM CHLORIDE, PRESERVATIVE FREE: 5 INJECTION INTRAVENOUS at 02:37

## 2018-11-19 ASSESSMENT — ACTIVITIES OF DAILY LIVING (ADL)
ADLS_ACUITY_SCORE: 10

## 2018-11-19 NOTE — PHARMACY-VANCOMYCIN DOSING SERVICE
Pharmacy Vancomycin Initial Note  Date of Service 2018  Patient's  1970  48 year old, male    Indication: Skin and Soft Tissue Infection    Current estimated CrCl = Estimated Creatinine Clearance: 148.2 mL/min (based on Cr of 0.77).    Creatinine for last 3 days  2018: 12:42 PM Creatinine 0.77 mg/dL    Recent Vancomycin Level(s) for last 3 days  No results found for requested labs within last 72 hours.      Vancomycin IV Administrations (past 72 hours)                   vancomycin (VANCOCIN) 1,500 mg in sodium chloride 0.9 % 250 mL intermittent infusion (mg) 1,500 mg New Bag 18 1428                Nephrotoxins and other renal medications (Future)    Start     Dose/Rate Route Frequency Ordered Stop    18 2300  vancomycin (VANCOCIN) 1,750 mg in sodium chloride 0.9 % 500 mL intermittent infusion      1,750 mg  over 2 Hours Intravenous EVERY 12 HOURS 18 1929      18 190  ibuprofen (ADVIL/MOTRIN) tablet 600 mg      600 mg Oral EVERY 6 HOURS PRN 18 1901                Plan:  1.  Start vancomycin  1750 mg IV q12h (~14.7 mg/kg). Schedule dose to be given a couple hours early at 2300 since the initial dose was on low end of mg/kg dosing.   2.  Goal Trough Level: 10-15 mg/L   3.  Pharmacy will check trough levels as appropriate in 1-3 Days.    4. Serum creatinine levels will be ordered daily for the first week of therapy and at least twice weekly for subsequent weeks.    5. [place] method utilized to dose vancomycin therapy: Method 2    Mendy Medina, Maria EstherD

## 2018-11-19 NOTE — PROVIDER NOTIFICATION
"  Paged orin duet to pt's c/o pain. States pain in LLE is getting worse, \"is killing me.\" Had Tylenol at 8pm with no change in pain. Would like something else for better pain mgmt.   "

## 2018-11-19 NOTE — H&P
[facility], [place]    History and Physical  Hospitalist       Date of Admission:  11/18/2018  Date of Service (when I saw the patient): 11/18/18    Assessment & Plan     Aaron Mercado is a 48 year old male with ho DM, HT, Obesity had a fall 10-2 weeks ago on left knee. For last 2 days he felt feverish. He woke up this morning with left leg swelling, pain and redness. The swelling and redness involves left knee and goes down to the ankle.     1. Cellulitis left lower extremity, Left knee prepatellar bursitis after a fall. Thought the fall was 10 d-14 d ago, the redness and swelling came fast. He woke up this morning with it. Likely this is streptococcal infection. However given DM, community acquired MRSA should be considered. He is able to bend the knee. Less likely it involves the knee. Xray and USG leg in ED was negative.    -Get BC, ESR/CRP  -start Vanc and Rocephin  -elevate the leg  -may consider ortho consult, but I don't think it needed   -glucose control    2. DM: Uses metformin at home. Dose unclear. Start lantus and meal time insuline here. Check HbA1c  3. HT: has not taken lisinopril on one month. He ran out of medications. Montior BP and consider starting 5 mg po daily if SBP more than 130  4. Obesity: Recommend weight loss       DVT Prophylaxis: Enoxaparin (Lovenox) SQ  Code Status: Full Code    Disposition: Expected discharge in 2-3 days once infection better    Kathya Borrego MD    Primary Care Physician   Quoc Flynn MD    Chief Complaint     Left knee pain    History is obtained from the patient    History of Present Illness      Aaron Mercado is a 48 year old male with ho DM, HT, Obesity admitted for left leg swelling, pain and redness which he noticed after he woke up. The swelling and redness involves left knee and goes down to the ankle.     He fell 10 to 2 weeks ago. He tripped on something. He was fine. He felt feverish for last couple of days.  When he woke up today he noticed redness and pain and swelling of the leg. It was hard for him to walk. He was seen in ED. He got 2 L fluid and IV Vanc. He was seen by orthopedics. He is being admitted.     He has DM. He has HT, but has not taken lisinopril in a months time. No ho recurrent infection.     Past Medical History    I have reviewed this patient's medical history and updated it with pertinent information if needed.   Past Medical History:   Diagnosis Date     Diabetes (H)      Hypertension      Obesity        Past Surgical History   I have reviewed this patient's surgical history and updated it with pertinent information if needed.  Past Surgical History:   Procedure Laterality Date     C ORAL SURGERY PROCEDURE  2000    [place] teeth extraction     LASIK  2003       Prior to Admission Medications   None     Allergies   Allergies   Allergen Reactions     Atorvastatin      myalgia       Social History      I have reviewed this patient's social history and updated it with pertinent information if needed. Aaron Mercado  reports that he has never smoked. He has never used smokeless tobacco. He reports that he drinks alcohol. He reports that he does not use illicit drugs.    Family History   I have reviewed this patient's family history and updated it with pertinent information if needed.   Family History   Problem Relation Age of Onset     Diabetes Mother      Family History Negative Father      Family History Negative Sister      Family History Negative Sister      Family History Negative Brother      Family History Negative Brother      Family History Negative Brother      Family History Negative Sister      Family History Negative Son        Review of Systems   The 10 point Review of Systems is negative other than noted in the HPI or here.    Physical Exam   Temp: 97.5  F (36.4  C) Temp src: Oral BP: 130/80 Pulse: 117 Heart Rate: 79 Resp: 16 SpO2: 98 % O2 Device: None (Room air)    Vital Signs with  Ranges  Temp:  [97.5  F (36.4  C)-100.8  F (38.2  C)] 97.5  F (36.4  C)  Pulse:  [117-130] 117  Heart Rate:  [79-80] 79  Resp:  [16-20] 16  BP: (125-159)/() 130/80  SpO2:  [96 %-100 %] 98 %  263 lbs 1.6 oz    Constitutional: Awake, alert, cooperative, no apparent distress.  Eyes: Conjunctiva and pupils examined and normal.  HEENT: Moist mucous membranes, normal dentition.  Respiratory: Clear to auscultation bilaterally, no crackles or wheezing.  Cardiovascular: Regular rate and rhythm, normal S1 and S2, and no murmur noted.  GI: Soft, non-distended, non-tender, normal bowel sounds.  Lymph/Hematologic: No anterior cervical or supraclavicular adenopathy.  Skin: Left knee redness and swelling starting from knee to the ankle. There swelling at the left prepatellar bursa site with mild fluctuation. Redness has been marked. There is increased warmth.       Musculoskeletal:  See above redness, swelling and increased warmth   Neurologic: Cranial nerves 2-12 intact, normal strength and sensation.  Psychiatric: Alert, oriented to person, place and time, no obvious anxiety or depression.    Data   Data reviewed today:  I personally reviewed no images or EKG's today.    Recent Labs  Lab 11/18/18  1242   WBC 17.8*   HGB 15.2   MCV 95         POTASSIUM 3.8   CHLORIDE 103   CO2 24   BUN 13   CR 0.77   ANIONGAP 11   DIONY 9.2   *       Recent Results (from the past 24 hour(s))   XR Knee Left 1/2 Views    Narrative    LEFT KNEE ONE TO TWO VIEWS 11/18/2018 1:52 PM     HISTORY: Pain. Redness. Swelling after fall.     COMPARISON: None.      Impression    IMPRESSION: Moderate narrowing medial joint space. Mild degenerative  marginal bony spurring at the patellofemoral joint. No acute bony  abnormality. Anterior subcutaneous edema.    JOHAN MADRID MD   US Lower Extremity Venous Duplex Left    Narrative    VENOUS DOPPLER LEFT LOWER EXTREMITY 11/18/2018 2:05 PM    HISTORY: Left lower extremity pain, swelling and  redness.    COMPARISON: None.    FINDINGS: Color-flow imaging and Doppler waveform spectral analysis  were utilized. There is normal compressibility and spontaneous flow  throughout the left common femoral, superficial femoral, popliteal and  visualized calf veins.      Impression    IMPRESSION: No evidence for deep venous thrombosis.    JOHAN MADRID MD

## 2018-11-20 LAB
ANION GAP SERPL CALCULATED.3IONS-SCNC: 5 MMOL/L (ref 3–14)
BASOPHILS # BLD AUTO: 0 10E9/L (ref 0–0.2)
BASOPHILS NFR BLD AUTO: 0.4 %
BUN SERPL-MCNC: 11 MG/DL (ref 7–30)
CALCIUM SERPL-MCNC: 9 MG/DL (ref 8.5–10.1)
CHLORIDE SERPL-SCNC: 107 MMOL/L (ref 94–109)
CO2 SERPL-SCNC: 27 MMOL/L (ref 20–32)
CREAT SERPL-MCNC: 0.62 MG/DL (ref 0.66–1.25)
CRP SERPL-MCNC: 134 MG/L (ref 0–8)
DIFFERENTIAL METHOD BLD: ABNORMAL
EOSINOPHIL # BLD AUTO: 0.5 10E9/L (ref 0–0.7)
EOSINOPHIL NFR BLD AUTO: 4.6 %
ERYTHROCYTE [DISTWIDTH] IN BLOOD BY AUTOMATED COUNT: 12.7 % (ref 10–15)
GFR SERPL CREATININE-BSD FRML MDRD: >90 ML/MIN/1.7M2
GLUCOSE BLDC GLUCOMTR-MCNC: 100 MG/DL (ref 70–99)
GLUCOSE BLDC GLUCOMTR-MCNC: 110 MG/DL (ref 70–99)
GLUCOSE BLDC GLUCOMTR-MCNC: 121 MG/DL (ref 70–99)
GLUCOSE BLDC GLUCOMTR-MCNC: 126 MG/DL (ref 70–99)
GLUCOSE BLDC GLUCOMTR-MCNC: 171 MG/DL (ref 70–99)
GLUCOSE SERPL-MCNC: 95 MG/DL (ref 70–99)
HCT VFR BLD AUTO: 38.8 % (ref 40–53)
HGB BLD-MCNC: 13.1 G/DL (ref 13.3–17.7)
IMM GRANULOCYTES # BLD: 0 10E9/L (ref 0–0.4)
IMM GRANULOCYTES NFR BLD: 0.2 %
LYMPHOCYTES # BLD AUTO: 1 10E9/L (ref 0.8–5.3)
LYMPHOCYTES NFR BLD AUTO: 9.1 %
MCH RBC QN AUTO: 32.7 PG (ref 26.5–33)
MCHC RBC AUTO-ENTMCNC: 33.8 G/DL (ref 31.5–36.5)
MCV RBC AUTO: 97 FL (ref 78–100)
MONOCYTES # BLD AUTO: 1 10E9/L (ref 0–1.3)
MONOCYTES NFR BLD AUTO: 8.4 %
NEUTROPHILS # BLD AUTO: 8.8 10E9/L (ref 1.6–8.3)
NEUTROPHILS NFR BLD AUTO: 77.3 %
NRBC # BLD AUTO: 0 10*3/UL
NRBC BLD AUTO-RTO: 0 /100
PLATELET # BLD AUTO: 315 10E9/L (ref 150–450)
POTASSIUM SERPL-SCNC: 3.7 MMOL/L (ref 3.4–5.3)
RBC # BLD AUTO: 4.01 10E12/L (ref 4.4–5.9)
SODIUM SERPL-SCNC: 139 MMOL/L (ref 133–144)
VANCOMYCIN SERPL-MCNC: 7.1 MG/L
WBC # BLD AUTO: 11.4 10E9/L (ref 4–11)

## 2018-11-20 PROCEDURE — 25000132 ZZH RX MED GY IP 250 OP 250 PS 637: Performed by: HOSPITALIST

## 2018-11-20 PROCEDURE — 86140 C-REACTIVE PROTEIN: CPT | Performed by: INTERNAL MEDICINE

## 2018-11-20 PROCEDURE — 36415 COLL VENOUS BLD VENIPUNCTURE: CPT | Performed by: INTERNAL MEDICINE

## 2018-11-20 PROCEDURE — 25000132 ZZH RX MED GY IP 250 OP 250 PS 637: Performed by: INTERNAL MEDICINE

## 2018-11-20 PROCEDURE — 99232 SBSQ HOSP IP/OBS MODERATE 35: CPT | Performed by: INTERNAL MEDICINE

## 2018-11-20 PROCEDURE — 25000128 H RX IP 250 OP 636: Performed by: HOSPITALIST

## 2018-11-20 PROCEDURE — 00000146 ZZHCL STATISTIC GLUCOSE BY METER IP

## 2018-11-20 PROCEDURE — 36415 COLL VENOUS BLD VENIPUNCTURE: CPT | Performed by: HOSPITALIST

## 2018-11-20 PROCEDURE — 80048 BASIC METABOLIC PNL TOTAL CA: CPT | Performed by: INTERNAL MEDICINE

## 2018-11-20 PROCEDURE — 12000001 ZZH R&B MED SURG/OB UMMC

## 2018-11-20 PROCEDURE — 80202 ASSAY OF VANCOMYCIN: CPT | Performed by: HOSPITALIST

## 2018-11-20 PROCEDURE — 85025 COMPLETE CBC W/AUTO DIFF WBC: CPT | Performed by: INTERNAL MEDICINE

## 2018-11-20 RX ORDER — TRAMADOL HYDROCHLORIDE 50 MG/1
50 TABLET ORAL EVERY 6 HOURS PRN
Status: DISCONTINUED | OUTPATIENT
Start: 2018-11-20 | End: 2018-11-24 | Stop reason: HOSPADM

## 2018-11-20 RX ORDER — LISINOPRIL 5 MG/1
5 TABLET ORAL DAILY
Status: DISCONTINUED | OUTPATIENT
Start: 2018-11-20 | End: 2018-11-24 | Stop reason: HOSPADM

## 2018-11-20 RX ADMIN — IBUPROFEN 600 MG: 600 TABLET, FILM COATED ORAL at 00:54

## 2018-11-20 RX ADMIN — VANCOMYCIN HYDROCHLORIDE 1750 MG: 10 INJECTION, POWDER, LYOPHILIZED, FOR SOLUTION INTRAVENOUS at 11:03

## 2018-11-20 RX ADMIN — TRAMADOL HYDROCHLORIDE 50 MG: 50 TABLET, COATED ORAL at 09:20

## 2018-11-20 RX ADMIN — SODIUM CHLORIDE, PRESERVATIVE FREE: 5 INJECTION INTRAVENOUS at 20:09

## 2018-11-20 RX ADMIN — LISINOPRIL 5 MG: 5 TABLET ORAL at 15:54

## 2018-11-20 RX ADMIN — SODIUM CHLORIDE, PRESERVATIVE FREE: 5 INJECTION INTRAVENOUS at 05:24

## 2018-11-20 RX ADMIN — TRAMADOL HYDROCHLORIDE 50 MG: 50 TABLET, COATED ORAL at 22:48

## 2018-11-20 RX ADMIN — IBUPROFEN 600 MG: 600 TABLET, FILM COATED ORAL at 11:54

## 2018-11-20 RX ADMIN — IBUPROFEN 600 MG: 600 TABLET, FILM COATED ORAL at 20:07

## 2018-11-20 RX ADMIN — VANCOMYCIN HYDROCHLORIDE 1750 MG: 10 INJECTION, POWDER, LYOPHILIZED, FOR SOLUTION INTRAVENOUS at 22:44

## 2018-11-20 RX ADMIN — ENOXAPARIN SODIUM 40 MG: 40 INJECTION SUBCUTANEOUS at 19:57

## 2018-11-20 RX ADMIN — CEFTRIAXONE SODIUM 2 G: 2 INJECTION, POWDER, FOR SOLUTION INTRAMUSCULAR; INTRAVENOUS at 19:56

## 2018-11-20 ASSESSMENT — ACTIVITIES OF DAILY LIVING (ADL)
ADLS_ACUITY_SCORE: 10

## 2018-11-20 NOTE — PHARMACY-VANCOMYCIN DOSING SERVICE
Pharmacy Vancomycin Note  Date of Service 2018  Patient's  1970   48 year old, male    Indication: Skin and Soft Tissue Infection  Goal Trough Level: 10-15 mg/L  Day of Therapy: Started 18  Current Vancomycin regimen: 1750 mg IV q12h    Current estimated CrCl = Estimated Creatinine Clearance: 184 mL/min (based on Cr of 0.62).    Creatinine for last 3 days  2018: 12:42 PM Creatinine 0.77 mg/dL;  8:31 PM Creatinine 0.77 mg/dL  2018:  7:18 AM Creatinine 0.65 mg/dL  2018:  7:18 AM Creatinine 0.62 mg/dL    Recent Vancomycin Levels (past 3 days)   2018: 10:07 AM Vancomycin Level 7.1 mg/L    Vancomycin IV Administrations (past 72 hours)                   vancomycin (VANCOCIN) 1,750 mg in sodium chloride 0.9 % 500 mL intermittent infusion (mg) 1,750 mg New Bag 18 2257     1,750 mg New Bag  1126     1,750 mg New Bag 18 2212    vancomycin (VANCOCIN) 1,500 mg in sodium chloride 0.9 % 250 mL intermittent infusion (mg) 1,500 mg New Bag 18 1428                Nephrotoxins and other renal medications (Future)    Start     Dose/Rate Route Frequency Ordered Stop    18 2300  vancomycin (VANCOCIN) 1,750 mg in sodium chloride 0.9 % 500 mL intermittent infusion      1,750 mg  over 2 Hours Intravenous EVERY 12 HOURS 18 1929      18 1901  ibuprofen (ADVIL/MOTRIN) tablet 600 mg      600 mg Oral EVERY 6 HOURS PRN 18 1901            Interpretation of levels and current regimen:  Trough level is subtherapeutic, however level drawn after 3 doses on current regimen.    Has serum creatinine changed > 50% in last 72 hours: No    Urine output: unable to determine    Renal Function: Stable    Plan:  1. Continue Current Dose  2. Pharmacy will check trough level tomorrow morning when patient is at steady state on current dose.    3. Serum creatinine levels will be ordered daily for the first week of therapy and at least twice weekly for subsequent weeks.       Deep Fierro, PharmD  PGY-1 Pharmacy Practice Resident

## 2018-11-20 NOTE — PROGRESS NOTES
[facility], [place]   Hospitalist Daily Progress Note                                                 Date of Admission:2018  ___________________________________  INTERVAL HISTORY (24 Hrs)/SUBJECTIVE:   Last 24 hr events, care team notes reviewed.     States doing better.   L Leg cellulitis: improving.   Denies fever or chills at current.   No cough or cp or sob.   No LH or dizziness.   No NV or pain abdomen.   No dysuria or bowel complaint.   No new sensory or motor complaint.   No new rash.    No other new or acute medical concern      ROS: 4 point ROS neg other than the symptoms noted above in the interval history.    OBJECTIVE :   VITALS:    Temp:  [97.2  F (36.2  C)-100.6  F (38.1  C)] 98.9  F (37.2  C)  Heart Rate:  [] 87  Resp:  [16-18] 18  BP: (137-147)/(77-94) 138/77  SpO2:  [94 %-98 %] 98 %  Temp (24hrs), Av.5  F (36.9  C), Min:97.2  F (36.2  C), Max:100.6  F (38.1  C)    Wt Readings from Last 5 Encounters:   18 119.3 kg (263 lb 1.6 oz)   18 117.9 kg (260 lb)   18 118.4 kg (261 lb)   18 118.2 kg (260 lb 8 oz)   18 123.8 kg (273 lb)      No intake or output data in the 24 hours ending 18 1534    PHYSICAL EXAM:  General: alert, interactive, NAD  HEENT: AT/NC, anicteric, Moist MM  Respi/Chest: Non labored. Clear BL  CVS/Heart: S1S2 regular, no murmur.   Gi/Abd: Soft, non tender, non distended, BS +  MSK/Ext: Distal pulses 2+.     Neuro: AO x 4,   Skin: L LE Cellulitis: receding from marked lines. Pre patellar bursitis ? . ROM L Knee intact.      Medications:   I have reviewed this patient's current medications.      Data:   All laboratory and imaging data in the past 24 hours reviewed:    LABS:  CMP    Recent Labs  Lab 18  1242     --   --  138   POTASSIUM 3.7  --   --  3.8   CHLORIDE 107  --   --  103   CO2 27  --   --  24   ANIONGAP 5  --   --  11   GLC 95  --   --   150*   BUN 11  --   --  13   CR 0.62* 0.65* 0.77 0.77   GFRESTIMATED >90 >90 >90 >90   GFRESTBLACK >90 >90 >90 >90   DIONY 9.0  --   --  9.2     CBC    Recent Labs  Lab 11/20/18  0718 11/19/18  0718 11/18/18  2031 11/18/18  1242   WBC 11.4* 14.1*  --  17.8*   RBC 4.01* 4.20*  --  4.65   HGB 13.1* 13.7  --  15.2   HCT 38.8* 40.4  --  44.3   MCV 97 96  --  95   MCH 32.7 32.6  --  32.7   MCHC 33.8 33.9  --  34.3   RDW 12.7 12.7  --  12.6    260 233 269     INRNo lab results found in last 7 days.  Unresulted Labs Ordered in the Past 30 Days of this Admission     Date and Time Order Name Status Description    11/18/2018 1901 Blood culture Preliminary     11/18/2018 1901 Blood culture Preliminary           CRP: 157--> 11/20/2018: 134.     No results found for this or any previous visit (from the past 24 hour(s)).      ASSESSMENT & PLAN :    Aaron Mercado is a 48 year old male with ho DM, HT, Obesity with L LE cellulitis. ? L Pre patellar bursitis     # Cellulitis left lower extremity,? Left knee prepatellar bursitis (following a fall.   Hx of fall ~2 weeks ago on L knee. DM +  He is able to bend the knee. Less likely it involves the knee. Xray and USG leg in ED was negative.  VSS. Non septic.   CRP trending down  Fever improving.   BC: NGTD     -started on iv Vanc and Rocephin- Continue.   -elevate the leg, serial exam  -Pain control: acetaminophen, ibuprofen, Tramadol prn.   - glucose control     * Ortho consultation 11/20/2018     # DM: type 2. HbA1c 5.0   Uses metformin at home. On hold.    Discontinue lantus  Ct sliding scale insulin.       Recent Labs  Lab 11/20/18  1352 11/20/18  0845 11/20/18  0718 11/20/18  0155 11/19/18  2226 11/19/18  1807 11/19/18  1356  11/18/18  1242   GLC  --   --  95  --   --   --   --   --  150*   * 100*  --  110* 102* 103* 82  < >  --    < > = values in this interval not displayed.     # HTN: has not taken lisinopril on one month. He ran out of medications. Monitor BP   -  resume @ 5 mg daily    # Obesity: Recommend weight loss     Diet :   Active Diet Order      Regular Diet Adult  DVT Prophylaxis: Enoxaparin (Lovenox) SQ  Code Status: Full Code     Disposition: Expected discharge in 2-3 days once infection better      Plan discussed with patient, bedside RN and CARMEN RUBI during Care Team Rounds.      Ashkan Carmen MD   Hospitalist (Internal Medicine)  Pager: [phone].

## 2018-11-20 NOTE — CONSULTS
[facility] Orthopedic Consultation    Aaron Mercado MRN# [id number]   Age: 48 year old YOB: 1970   Date of Admission:  11/18/2018    Reason for consult: Left leg cellulitis and left knee pain   Requesting physician: Kathya Borrego*   Level of consult: Consult, follow and place orders          Impression and Recommendation:   Impression:  Aaron Mercado is a 48 year old male with a h/o obesity and controlled Type 2 DM who presents with:  1. Left lower extremity cellulitis  2. Left pre-patellar bursitis     Recomendations: At this point, Aaron has just completed 48 hours of IV Abx.  We would expect to start seeing improvement in his cellulitis and symptoms at this point, and I believe we are.  He states he has less discomfort and less redness.  He is also able to move his knee more today than yesterday.  I would recommend we continue with the current plan and eventually transition him to oral Abx when able.  I would not recommend aspirating his bursa, as his symptoms are resolving, and it is not very large, and likely to contain hematoma at this point.  I did explain to him there is a risk that this infection can develop into a septic bursitis, which would require I&D, but it does not look like that at this point.  I recommend frequent ice and elevation of the left leg, ROM as tolerated, WBAT.  I recommend for the next several weeks he avoid any kneeling or repetitive knee flexion exercises.  I asked if he would need a work note, as he does stand all day at his job, but he declined at this point.  We will continue to follow him daily and make sure he continues to improve.  All questions answered.    Activity: WBAT BLE. ROM as tolerated.   Antibiotics: continue IV as recommended, transition to PO when able  Micro: Will continue to follow cultures x 2 weeks  Diet: ADAT  Pain: PO/IV meds. Transition to PO meds only as patient tolerates  DVT ppx: Pt on Lovenox per Medicine  Imaging: films  reviewed and are satisfactory. No further imaging need at this time.  Consults: Recommend ID consult for antibiotic selection/duration, if needed  Dispo: Per primary team.   Follow up: 3-4 weeks in outpatient Ortho as needed         Chief Complaint:   Left leg/knee pain, swelling and redness         History of Present Illness:   This patient is a 48 year old male with a significant past medical history of diabetes, hypertension and obesity who presents with 3 day history of increased redness, swelling and pain to the left lower extremity.  Pt states about 10 days ago, he fell straight onto his knee on a concrete floor. He was wearing pants at the time.  He denies any cuts/abrasions.  He did have immediate swelling on the front of the knee the size of a golf ball.  It was improving, but then a few days ago, he started noticing his leg getting very red and swollen.  He was seen in the ED 2 days ago and diagnosed with RLE cellulitis and started on IV Rocephin and Vanco.  He has no prior h/o cellulitis.  He reports today that his pain is somewhat improved, and the redness seems better as well.  He still has aching in the front of the knee where he landed on it previously.  He has been elevating it, but no icing.  He feels he can bend the knee a little better today as well.  He lives alone and works at [employer], standing all day.  He reports he is otherwise fairly healthy, and his diabetes is controlled by medication only.  No other concerns.     History obtained from patient interview and chart review.        Past Medical History:     Past Medical History:   Diagnosis Date     Diabetes (H)      Hypertension      Obesity              Past Surgical History:     Past Surgical History:   Procedure Laterality Date     C ORAL SURGERY PROCEDURE  2000    [place] teeth extraction     LASIK  2003   No h/o difficulty with surgery or anesthesia          Social History:   Tobacco use: 0 packs/day for 0 years  Alcohol use: occasional  drinks/day  Elicit drug use: Patient denies  Occupation: works at [employer]  Living situation: lives alone in house  Family contact information: see chart          Family History:   No family history of anesthesia, bleeding or clotting complications.           Allergies:     Allergies   Allergen Reactions     Atorvastatin      myalgia             Medications:   Medication reviewed with patient and in chart.  Anticoagulation: started 2 days ago on Lovenox 40mg subcutaneous daily  Antibiotics: currently IV Vanco and Rocephin daily          Review of Systems:   CONSTITUTIONAL:  negative for  fevers, chills, sweats, malaise today, but did have episodes of feeling feverish a few days ago.  HEENT:  negative for earaches, nasal congestion, epistaxis, sore throat  RESPIRATORY:  negative for dyspnea, wheezing, and cough.  CARDIOVASCULAR:  negative for chest pain, palpitations, orthopnea, edema, syncope.  GASTROINTESTINAL:  negative for nausea, vomiting, diarrhea, constipation, abdominal pain.  GENITOURINARY:  negative for dysuria, nocturia, urinary incontinence and hematuria  INTEGUMENT/BREAST:  negative for rash and skin lesions, other than left leg  HEMATOLOGIC/LYMPHATIC:  negative for easy bruising, bleeding, swelling/edema  ALLERGIC/IMMUNOLOGIC:  negative for recurrent infections and drug reactions  ENDOCRINE: negative for diabetic symptoms including polyuria and polydipsia  MUSCULOSKELETAL: negative for myalgias, arthralgias, joint swelling and muscle weakness, except as stated in HPI  NEUROLOGICAL: negative for headaches, dizziness, gait problems, numbness/tingling          Physical Exam:     /85 (BP Location: Right arm)  Pulse 84  Temp 97.3  F (36.3  C) (Oral)  Resp 16  Wt 119.3 kg (263 lb 1.6 oz)  SpO2 97%  BMI 41.21 kg/m2  General: awake, alert, cooperative, no apparent distress, appears stated age  HEENT: normocephalic, atraumatic, PERRL, EOMI, no scleral icterus, MMM  Respiratory: breathing  non-labored, no wheezing  Cardiovascular: peripheral pulses 2+ and symmetric, capillary refill < 2sec, skin wwp  Skin: no rashes or lesions  Neurological: A&Ox3, CN II-XII grossly intact  Musculoskeletal:  LLE: Skin marked from initial cellulitis, redness has not spread past these marks and seems to be receding slightly.  Still does have warmth, swelling and edema of LLE from ankle to proximal knee.  Many small scratches and skin breaks throughout leg.  Anterior knee with mild swelling, minimal effusion.  Prepatellar bursa tender and mild fluctuance, but not very prominent or tense.  Left knee ROM 0-75 deg d/t pain and swelling. 5/5 SLR. Fires TA/Gastroc/EHL/FHL with 5/5 strength. SILT in femoral, sural, saphenous, deep peroneal, superficial peroneal, and tibial nerve distributions. Dorsalis pedis and posterior tibial arteries 2+ and foot wwp with BCR.          Imaging:   Review of left knee films from 11/20/2018 demonstrate:   Moderate narrowing medial joint space. Mild degenerative  marginal bony spurring at the patellofemoral joint. No acute bony  abnormality. Anterior subcutaneous edema.          Laboratory date:   CBC:  Lab Results   Component Value Date    WBC 11.4 (H) 11/20/2018    HGB 13.1 (L) 11/20/2018     11/20/2018       BMP:  Lab Results   Component Value Date     11/20/2018    POTASSIUM 3.7 11/20/2018    CHLORIDE 107 11/20/2018    CO2 27 11/20/2018    BUN 11 11/20/2018    CR 0.62 (L) 11/20/2018    ANIONGAP 5 11/20/2018    DIONY 9.0 11/20/2018    GLC 95 11/20/2018       Inflammatory Markers:  Lab Results   Component Value Date    WBC 11.4 (H) 11/20/2018    .0 (H) 11/20/2018    SED 16 (H) 11/18/2018       Cultures:    Recent Labs  Lab 11/18/18 2032 11/18/18 2031   CULT No growth after 2 days No growth after 2 days       Time Patient Evaluated: 0900      Gali Summers PA-C  Department of Orthopedics  [phone]

## 2018-11-20 NOTE — PROGRESS NOTES
Care Coordinator Progress Note    Admission Date/Time:  11/18/2018  Attending MD:  Kathya Borrego*    Data  Chart reviewed, discussed with interdisciplinary team.   Patient was admitted for:    Cellulitis of left lower extremity  Septic bursitis.    Coordination of Care and Referrals: [facility] benefit check for home IV antibiotics in progress.      Plan  Anticipated Discharge Date:  TBD  Anticipated Discharge Plan:  TBD    Roseann Lopez RN, BSN  Care Coordinator, 8A  Phone [phone]  Pager [phone]

## 2018-11-21 LAB
CREAT SERPL-MCNC: 0.63 MG/DL (ref 0.66–1.25)
CRP SERPL-MCNC: 98 MG/L (ref 0–8)
ERYTHROCYTE [DISTWIDTH] IN BLOOD BY AUTOMATED COUNT: 12.4 % (ref 10–15)
GFR SERPL CREATININE-BSD FRML MDRD: >90 ML/MIN/1.7M2
GLUCOSE BLDC GLUCOMTR-MCNC: 109 MG/DL (ref 70–99)
GLUCOSE BLDC GLUCOMTR-MCNC: 134 MG/DL (ref 70–99)
GLUCOSE BLDC GLUCOMTR-MCNC: 144 MG/DL (ref 70–99)
GLUCOSE BLDC GLUCOMTR-MCNC: 162 MG/DL (ref 70–99)
HCT VFR BLD AUTO: 39 % (ref 40–53)
HGB BLD-MCNC: 13.4 G/DL (ref 13.3–17.7)
MCH RBC QN AUTO: 32.8 PG (ref 26.5–33)
MCHC RBC AUTO-ENTMCNC: 34.4 G/DL (ref 31.5–36.5)
MCV RBC AUTO: 95 FL (ref 78–100)
PLATELET # BLD AUTO: 297 10E9/L (ref 150–450)
RBC # BLD AUTO: 4.09 10E12/L (ref 4.4–5.9)
VANCOMYCIN SERPL-MCNC: 7.5 MG/L
WBC # BLD AUTO: 10.5 10E9/L (ref 4–11)

## 2018-11-21 PROCEDURE — 86140 C-REACTIVE PROTEIN: CPT | Performed by: INTERNAL MEDICINE

## 2018-11-21 PROCEDURE — 12000001 ZZH R&B MED SURG/OB UMMC

## 2018-11-21 PROCEDURE — 25000132 ZZH RX MED GY IP 250 OP 250 PS 637: Performed by: HOSPITALIST

## 2018-11-21 PROCEDURE — 25000128 H RX IP 250 OP 636

## 2018-11-21 PROCEDURE — 99222 1ST HOSP IP/OBS MODERATE 55: CPT | Performed by: INTERNAL MEDICINE

## 2018-11-21 PROCEDURE — 80202 ASSAY OF VANCOMYCIN: CPT | Performed by: HOSPITALIST

## 2018-11-21 PROCEDURE — 99207 ZZC CONSULT E&M CHANGED TO INITIAL LEVEL: CPT | Performed by: INTERNAL MEDICINE

## 2018-11-21 PROCEDURE — 25000132 ZZH RX MED GY IP 250 OP 250 PS 637: Performed by: INTERNAL MEDICINE

## 2018-11-21 PROCEDURE — 99232 SBSQ HOSP IP/OBS MODERATE 35: CPT | Performed by: INTERNAL MEDICINE

## 2018-11-21 PROCEDURE — 85027 COMPLETE CBC AUTOMATED: CPT | Performed by: INTERNAL MEDICINE

## 2018-11-21 PROCEDURE — 82565 ASSAY OF CREATININE: CPT | Performed by: INTERNAL MEDICINE

## 2018-11-21 PROCEDURE — 00000146 ZZHCL STATISTIC GLUCOSE BY METER IP

## 2018-11-21 PROCEDURE — 25000128 H RX IP 250 OP 636: Performed by: HOSPITALIST

## 2018-11-21 PROCEDURE — 36415 COLL VENOUS BLD VENIPUNCTURE: CPT | Performed by: INTERNAL MEDICINE

## 2018-11-21 PROCEDURE — 36415 COLL VENOUS BLD VENIPUNCTURE: CPT | Performed by: HOSPITALIST

## 2018-11-21 RX ORDER — DIPHENHYDRAMINE HCL 25 MG
25 CAPSULE ORAL EVERY 6 HOURS PRN
Status: DISCONTINUED | OUTPATIENT
Start: 2018-11-21 | End: 2018-11-24 | Stop reason: HOSPADM

## 2018-11-21 RX ORDER — DIPHENHYDRAMINE HCL 25 MG
25 CAPSULE ORAL EVERY 6 HOURS PRN
Status: DISCONTINUED | OUTPATIENT
Start: 2018-11-21 | End: 2018-11-21

## 2018-11-21 RX ORDER — DIPHENHYDRAMINE HYDROCHLORIDE 50 MG/ML
25 INJECTION INTRAMUSCULAR; INTRAVENOUS EVERY 6 HOURS PRN
Status: DISCONTINUED | OUTPATIENT
Start: 2018-11-21 | End: 2018-11-24 | Stop reason: HOSPADM

## 2018-11-21 RX ORDER — DIPHENHYDRAMINE HYDROCHLORIDE 50 MG/ML
25 INJECTION INTRAMUSCULAR; INTRAVENOUS EVERY 6 HOURS PRN
Status: DISCONTINUED | OUTPATIENT
Start: 2018-11-21 | End: 2018-11-21

## 2018-11-21 RX ORDER — SODIUM CHLORIDE 9 MG/ML
INJECTION, SOLUTION INTRAVENOUS
Status: COMPLETED
Start: 2018-11-21 | End: 2018-11-21

## 2018-11-21 RX ADMIN — DIPHENHYDRAMINE HYDROCHLORIDE 25 MG: 25 CAPSULE ORAL at 11:53

## 2018-11-21 RX ADMIN — TRAMADOL HYDROCHLORIDE 50 MG: 50 TABLET, COATED ORAL at 22:58

## 2018-11-21 RX ADMIN — VANCOMYCIN HYDROCHLORIDE 1750 MG: 10 INJECTION, POWDER, LYOPHILIZED, FOR SOLUTION INTRAVENOUS at 11:02

## 2018-11-21 RX ADMIN — IBUPROFEN 600 MG: 600 TABLET, FILM COATED ORAL at 07:49

## 2018-11-21 RX ADMIN — DIPHENHYDRAMINE HYDROCHLORIDE 25 MG: 25 CAPSULE ORAL at 22:58

## 2018-11-21 RX ADMIN — LISINOPRIL 5 MG: 5 TABLET ORAL at 07:49

## 2018-11-21 RX ADMIN — SODIUM CHLORIDE 250 ML: 9 INJECTION, SOLUTION INTRAVENOUS at 23:00

## 2018-11-21 RX ADMIN — IBUPROFEN 600 MG: 600 TABLET, FILM COATED ORAL at 17:14

## 2018-11-21 RX ADMIN — ENOXAPARIN SODIUM 40 MG: 40 INJECTION SUBCUTANEOUS at 19:30

## 2018-11-21 ASSESSMENT — ACTIVITIES OF DAILY LIVING (ADL)
ADLS_ACUITY_SCORE: 10

## 2018-11-21 NOTE — PROGRESS NOTES
[facility], [place]   Hospitalist Daily Progress Note                                                 Date of Admission:2018  ___________________________________  INTERVAL HISTORY (24 Hrs)/SUBJECTIVE:   Last 24 hr events, care team notes reviewed.     States doing better.   L Leg cellulitis: slowly improving.   Denies fever or chills at current.   No cough or cp or sob.   No LH or dizziness.   No NV or pain abdomen.   No dysuria or bowel complaint.   No new sensory or motor complaint.   No new rash.    No other new or acute medical concern      ROS: 4 point ROS neg other than the symptoms noted above in the interval history.    OBJECTIVE :   VITALS:    Temp:  [97.6  F (36.4  C)-99.4  F (37.4  C)] 99.4  F (37.4  C)  Pulse:  [88-94] 91  Resp:  [16-18] 16  BP: (117-161)/(68-90) 161/90  SpO2:  [97 %-98 %] 98 %    Temp (24hrs), Av.6  F (37  C), Min:97.6  F (36.4  C), Max:99.4  F (37.4  C)      Wt Readings from Last 5 Encounters:   18 119.3 kg (263 lb 1.6 oz)   18 117.9 kg (260 lb)   18 118.4 kg (261 lb)   18 118.2 kg (260 lb 8 oz)   18 123.8 kg (273 lb)      No intake or output data in the 24 hours ending 18 1534    PHYSICAL EXAM:  General: alert, interactive, NAD  HEENT: AT/NC, anicteric, Moist MM  Respi/Chest: Non labored. Clear BL  CVS/Heart: S1S2 regular  Gi/Abd: Soft, non tender, non distended  MSK/Ext: Distal pulses 2+.     Neuro: AO x 4,   Skin: L LE Cellulitis: erythema- fading, swelling pain better. Pre patellar bursitis ? . ROM L Knee intact.      Medications:   I have reviewed this patient's current medications.      Data:   All laboratory and imaging data in the past 24 hours reviewed:    LABS:  CMP    Recent Labs  Lab 18  0534 18  1242   NA  --  139  --   --  138   POTASSIUM  --  3.7  --   --  3.8   CHLORIDE  --  107  --   --  103   CO2  --  27  --   --  24   ANIONGAP  --  5   --   --  11   GLC  --  95  --   --  150*   BUN  --  11  --   --  13   CR 0.63* 0.62* 0.65* 0.77 0.77   GFRESTIMATED >90 >90 >90 >90 >90   GFRESTBLACK >90 >90 >90 >90 >90   DIONY  --  9.0  --   --  9.2     CBC    Recent Labs  Lab 11/21/18  0534 11/20/18  0718 11/19/18  0718 11/18/18  2031 11/18/18  1242   WBC 10.5 11.4* 14.1*  --  17.8*   RBC 4.09* 4.01* 4.20*  --  4.65   HGB 13.4 13.1* 13.7  --  15.2   HCT 39.0* 38.8* 40.4  --  44.3   MCV 95 97 96  --  95   MCH 32.8 32.7 32.6  --  32.7   MCHC 34.4 33.8 33.9  --  34.3   RDW 12.4 12.7 12.7  --  12.6    315 260 233 269     INRNo lab results found in last 7 days.  Unresulted Labs Ordered in the Past 30 Days of this Admission     Date and Time Order Name Status Description    11/18/2018 1901 Blood culture Preliminary     11/18/2018 1901 Blood culture Preliminary           CRP: 157--> 11/20/2018: 134.     No results found for this or any previous visit (from the past 24 hour(s)).      ASSESSMENT & PLAN :    Aaron Mercado is a 48 year old male with ho DM, HT, Obesity with L LE cellulitis. ? L Pre patellar bursitis     # Cellulitis left lower extremity,? Left knee prepatellar bursitis (following a fall.   Hx of fall ~2 weeks ago on L knee. DM +  He is able to bend the knee. Less likely it involves the knee. Xray and USG leg in ED was negative.  VSS. Non septic.   CRP trending down  Fever improving.   BC: NGTD     -started on iv Vanc and Rocephin- Continue.   -elevate the leg, serial exam  -Pain control: acetaminophen, ibuprofen, Tramadol prn.   - glucose control     *11/20/2018: Ortho consultation : recs reviewed. Appreciate input.   11/21/2018: ID consultation: d/w ID. Appreciate recs. Discontinue iv Ceftriaxone. Ct iv Vanco. Check mrsa pcr.         # DM: type 2. HbA1c 5.0   Uses metformin at home (dose unsure)- On hold.    Ct sliding scale insulin.       Recent Labs  Lab 11/21/18  0814 11/21/18  0223 11/20/18  2133 11/20/18  1742 11/20/18  1352 11/20/18  0845  11/20/18  0718  11/18/18  1242   GLC  --   --   --   --   --   --  95  --  150*   * 109* 171* 121* 126* 100*  --   < >  --    < > = values in this interval not displayed.     # HTN: has not taken lisinopril on one month. He ran out of medications. Monitor BP   - resume @ 5 mg daily 11.20    # Obesity: Recommend weight loss     Diet :   Active Diet Order      Regular Diet Adult  DVT Prophylaxis: Enoxaparin (Lovenox) SQ  Code Status: Full Code     Disposition: Expected discharge in 2-3 days once infection better      Plan discussed with patient, bedside RN and ELICIA CC during Care Team Rounds.  D/w ID 11/21/2018      Ashkan Carmen MD   Hospitalist (Internal Medicine)  Pager: [phone].

## 2018-11-21 NOTE — PHARMACY-VANCOMYCIN DOSING SERVICE
Pharmacy Vancomycin Note  Date of Service 2018  Patient's  1970   48 year old, male    Indication: Skin and Soft Tissue Infection  Goal Trough Level: 10-15 mg/L  Day of Therapy: 4  Current Vancomycin regimen:  1750 mg IV q12h    Current estimated CrCl = Estimated Creatinine Clearance: 181.1 mL/min (based on Cr of 0.63).    Creatinine for last 3 days  2018: 12:42 PM Creatinine 0.77 mg/dL;  8:31 PM Creatinine 0.77 mg/dL  2018:  7:18 AM Creatinine 0.65 mg/dL  2018:  7:18 AM Creatinine 0.62 mg/dL  2018:  5:34 AM Creatinine 0.63 mg/dL    Recent Vancomycin Levels (past 3 days)  2018: 10:07 AM Vancomycin Level 7.1 mg/L  2018: 10:13 AM Vancomycin Level 7.5 mg/L    Vancomycin IV Administrations (past 72 hours)                   vancomycin (VANCOCIN) 1,750 mg in sodium chloride 0.9 % 500 mL intermittent infusion (mg) 1,750 mg New Bag 18 1102     1,750 mg New Bag 18 2244     1,750 mg New Bag  1103     1,750 mg New Bag 18 2257     1,750 mg New Bag  1126     1,750 mg New Bag 18 2212                Nephrotoxins and other renal medications (Future)    Start     Dose/Rate Route Frequency Ordered Stop    18 2300  vancomycin (VANCOCIN) 2,000 mg in sodium chloride 0.9 % 500 mL intermittent infusion      2,000 mg  over 2 Hours Intravenous EVERY 12 HOURS 18 1113      18 1545  lisinopril (PRINIVIL/ZESTRIL) tablet 5 mg      5 mg Oral DAILY 18 1542      18 1901  ibuprofen (ADVIL/MOTRIN) tablet 600 mg      600 mg Oral EVERY 6 HOURS PRN 18 1901               Contrast Orders - past 72 hours     None          Interpretation of levels and current regimen:  Trough level is Subtherapeutic    Has serum creatinine changed > 50% in last 72 hours: No    Urine output: adequate per nursing notes    Renal Function: Stable    Plan:  1. Increase Dose to 2000 mg IV q12h - plan to transition to cefazolin if MRSA swab PCR is negative.  2.  Pharmacy will check trough levels as appropriate in 1-3 Days.    3. Serum creatinine levels will be ordered daily for the first week of therapy and at least twice weekly for subsequent weeks.      Deep Fierro, PharmD  PGY-1 Pharmacy Practice Resident

## 2018-11-21 NOTE — CONSULTS
INFECTIOUS DISEASE CONSULTATION    NAME: Aaron Mercado  MRN:  [id number]  AGE:  48 year old            :  1970    PRIMARY CARE PROVIDER:  Quoc Flynn  Consult Requester:  Kathya Borrego*     Date of Admission:   2018  Date of Visit:  2018    REASON FOR CONSULT: antibiotic suggestions for patient with left leg cellulitis    IMPRESSION AND SUGGESTIONS: LLE cellulitis; I do not think that he has a septic bursitis or septic arthritis of the knee.    The cellulitis is likely either streptococcal or staphylococcal. He has no pets, so pathogens from, for example, a dog or a cat licking the area, do not have to be considered. He has not had any prior infections of the skin according to the patient and does not have any cultures that would help us know if he has MRSA or not.    Of note, shortly prior to the onset of the cellulitis he had a small purulent area on the skin of the RLQ of his abdomen that then opened and drained. This would be more likely staphylococcal than streptococcal. In addition to his fall, at work he would wear something that resulted in scratches occurring on his left calves. So, he had the combination of skin that was not intact and at the same time drainage of what may have been Staphylococcus aureus from the skin of the lower abdomen. On examination the RLQ skin lesion is healing with a scab and is approximately 2 x 1 cm in size.    If the patient does not have methicillin-resistant Staphylococcus aureus (MRSA), he could be treated here with IV cefazolin alone to cover both staphylococcal and streptococcal cellulitis. It is unlikely that he has either a Gram-negative or an anaerobic process that is causing his cellulitis and has no pet exposure to be concerned about less common pathogens of cellulitis. He does not appear on exam to have a septic bursitis. He has no involvement of the foot or the joints (no septic arthritis). The patient stated that his  diabetes is under good control with HgbA1c levels of 5.1 to 5.4. Here it was 5.0. He has no decrease in vibratory sensation on his feet or great toes or fifth toes on my exam with a 128 Hz tuning fork, though he may well have some degree of diabetic microvascular involvement that may make his cellulitis respond less quickly than normal.    Suggest:  1. Obtain nasal swab for PCR for MRSA (I have ordered this)  2. Continue vancomycin and discontinue ceftriaxone until the results of the nasal swab are available  3. If the patient is not colonized with MRSA, discontinue vancomycin and begin IV cefazolin  4. The patient will continue to need to be treated with IV antibiotics for a minimum of several more days given his slow but gradual response and his underlying diabetes    Consider obtaining a testosterone level:  He has an absence of chest hair (he said that he does not shave his chest hair and that this has been the case for years) and less lower abdominal and pubic hair than is typical for men. When asked, he said that he only needs to shave his face approximately once every 5 days. He does have a son and his testicular examination has normal sized testes, but he may have low testosterone.    HISTORY OF PRESENT ILLNESS:  This 48-year-old man who has had diabetes type 2 diagnosed 6 years ago had a pustule on the skin of his right lower quadrant that opened approximately at the time he fell, several days prior to admission, landing on his left knee and right shoulder. He subsequently developed spreading of LLE erythema to include areas from above the knee to the ankle with fever and was admitted from the ED on 11/18/2018. There, in addition to what was clinically felt to be cellulitis, he was found to have a prominent pre-patellar bursa that was not felt by orthopedic surgery clinically to be a septic bursitis.    He had a WBC of 17,800 and a Tmax of 100.8 degrees Fahrenheit on the day of admission.  The border of the  cellulitis was marked and the patient had two sets of blood cultures obtained that have no growth to date. He was begun on IV vancomycin and IV ceftriaxone. A non-invasive ultrasound study did not find evidence of RLE DVT.     His WBC and temperature have responded since admission:    Date WBC Tmax (degrees F)   11/18/2018 17,800 100.8   11/19/2018 14,100 100.6   11/20/2018 11,400 99.0   11/21/2018 10,500 99.4     The patient and the progress notes state that the LLE is less erythematous than was the case on admission. However, there has not been any notable decrease in the area of the erythema as it has not receded from the borders that were drawn on the date of admission.    The patient has no pets. He stated that he has not had any prior skin infections. His most recent tetanus immunization was on 6/13/2018. He has never traveled outside the [place] and has lived in [place] during his entire life with infrequent travel to other states.     PAST MEDICAL HISTORY:  Past Medical History:   Diagnosis Date     Diabetes (H)      Hypertension      Obesity        PAST SURGICAL HISTORY:  Past Surgical History:   Procedure Laterality Date     C ORAL SURGERY PROCEDURE  2000    [place] teeth extraction     LASIK  2003       ALLERGIES:  Atorvastatin    CURRENT MEDICATIONS:  Current Facility-Administered Medications   Medication     acetaminophen (TYLENOL) tablet 650 mg     cefTRIAXone (ROCEPHIN) 2 g vial to attach to  ml bag for ADULTS or NS 50 ml bag for PEDS     glucose gel 15-30 g    Or     dextrose 50 % injection 25-50 mL    Or     glucagon injection 1 mg     enoxaparin (LOVENOX) injection 40 mg     ibuprofen (ADVIL/MOTRIN) tablet 600 mg     influenza quadrivalent (PF) vacc (FLUZONE or Flulaval or FLUARIX) injection 0.5 mL     insulin aspart (NovoLOG) inj (RAPID ACTING)     insulin aspart (NovoLOG) inj (RAPID ACTING)     lisinopril (PRINIVIL/ZESTRIL) tablet 5 mg     melatonin tablet 1 mg     naloxone (NARCAN)  "injection 0.1-0.4 mg     ondansetron (ZOFRAN-ODT) ODT tab 4 mg    Or     ondansetron (ZOFRAN) injection 4 mg     prochlorperazine (COMPAZINE) injection 10 mg    Or     prochlorperazine (COMPAZINE) tablet 10 mg    Or     prochlorperazine (COMPAZINE) Suppository 25 mg     sodium chloride 0.9% infusion     sodium chloride 0.9% infusion     sodium chloride 0.9% infusion     traMADol (ULTRAM) tablet 50 mg     vancomycin (VANCOCIN) 1,750 mg in sodium chloride 0.9 % 500 mL intermittent infusion       FAMILY HISTORY: the patient stated that his son has been told that he has \"pre-diabetes\"  Family History   Problem Relation Age of Onset     Diabetes Mother      Family History Negative Father      Family History Negative Sister      Family History Negative Sister      Family History Negative Brother      Family History Negative Brother      Family History Negative Brother      Family History Negative Sister      Family History Negative Son        SOCIAL HISTORY:  Social History     Social History     Marital status: Single     Spouse name: N/A     Number of children: 1     Years of education: N/A     Occupational History     [employer]     Social History Main Topics     Smoking status: Never Smoker     Smokeless tobacco: Never Used      Comment: Non smoke home     Alcohol use Yes      Comment: 1-2 nights out during the week     Drug use: No     Sexual activity: Yes     Partners: Female     Birth control/ protection: Condom     Other Topics Concern      Service No     Blood Transfusions No     Caffeine Concern Not Asked     2 bottles of soda a day. Regular     Exercise No     Golfs and walks     Seat Belt Yes     Self-Exams Yes     Parent/Sibling W/ Cabg, Mi Or Angioplasty Before 65f 55m? No     Social History Narrative    2018:         06-    Balanced Diet - Yes    Osteoporosis Preventative measures-  Dairy servings per day: 2    Regular Exercise -  No Describe     Dental Exam up - about 1 year " ago    Eye Exam - about 3 years ago    Self Testicular Exam -  YES    Do you have any concerns about STD's -  No    Abuse: Current or Past (Physical, Sexual or Emotional)- No    Do you feel safe in your environment - Yes    Guns stored in the home - No    Sunscreen used - No    Seatbelts used - Yes    Lipids - NO    Glucose -  NO    Colon Cancer Screening - Colonoscopy 8-10 years ago at [facility](date completed)    Hemoccults - NO    PSA - NO    Digital Rectal Exam - UNKNOWN    Immunizations reviewed and up to date - unsure of last Td               REVIEW OF SYSTEMS:  No fever or chills. No chest pain, shortness of breath, cough, abdominal pain, nausea, vomiting, diarrhea, constipation, decreased sensation of feet. Positive for left knee and calf pain. He last saw his dentist approximately one year ago. A 10-point ROS is otherwise negative.    LABORATORY RESULTS:  Inflammatory Markers    Recent Labs   Lab Test  11/21/18 0534  11/20/18 0718 11/18/18   1242   SED   --    --   16*   CRP  98.0*  134.0*  157.0*       Hematology Studies    Recent Labs   Lab Test  11/21/18   0534  11/20/18   0718  11/19/18 0718 11/18/18 2031 11/18/18   1242  05/31/18   1204   WBC  10.5  11.4*  14.1*   --   17.8*  5.1   ANEU   --   8.8*   --    --   15.9*  3.6   AEOS   --   0.5   --    --   0.1  0.1   HGB  13.4  13.1*  13.7   --   15.2  16.4   MCV  95  97  96   --   95  95   PLT  297  315  260  233  269  264     Metabolic Studies     Recent Labs   Lab Test  11/21/18   0534  11/20/18   0718  11/19/18   0718  11/18/18 2031 11/18/18   1242  05/31/18   1204  02/08/18   0840  12/14/16   1620   NA   --   139   --    --   138  140  135  138   POTASSIUM   --   3.7   --    --   3.8  4.2  4.1  3.8   CHLORIDE   --   107   --    --   103  105  103  105   CO2   --   27   --    --   24  27  28  28   BUN   --   11   --    --   13  16  14  14   CR  0.63*  0.62*  0.65*  0.77  0.77  0.75  0.69  0.84   GFRESTIMATED  >90  >90  >90  >90  >90  >90   >90  >90  Non African American GFR Calc         Hepatic Studies    Recent Labs   Lab Test  18   1204  16   1620  05/20/15   1030  14   1542  14   0926   BILITOTAL  1.0  0.5  0.4  0.6  0.6   ALKPHOS  87  115  127  118  132   ALBUMIN  3.9  3.8  3.7  4.2  4.1   AST  30  24  25  46*  42   ALT  48  41  41  47  62       Thyroid Studies    Recent Labs   Lab Test  18   1204  18   0840   TSH  1.14  2.23       Microbiology:  Culture Micro   Date Value Ref Range Status   2018 No growth after 3 days  Preliminary   2018 No growth after 3 days  Preliminary   2013 No Beta Streptococcus isolated  Final   2009 No Beta Streptococcus isolated  Final   2008 No Beta Streptococcus isolated  Final       Urine Studies    Recent Labs   Lab Test  14   1149   LEUKEST  Negative       Vancomycin Levels    Recent Labs   Lab Test  18   1007   VANCOMYCIN  7.1     Hepatitis C Testing     Hepatitis C Antibody   Date Value Ref Range Status   2009 Negative NEG Final         Vitals:    18 1554 18 2200 18 0750 18 0816   BP: 136/85 133/68 117/75    BP Location:  Right arm     Pulse:  88 94    Resp:    Temp: 97.1  F (36.2  C) 97.6  F (36.4  C)  99.4  F (37.4  C)   TempSrc: Oral Oral  Oral   SpO2: 97% 97%  97%   Weight:         Temp (high/low/average during past 24 hours): Temp (24hrs), Av.3  F (36.8  C), Min:97.1  F (36.2  C), Max:99.4  F (37.4  C)      PHYSICAL EXAMINATION:  Vital signs as above  General: Pleasant man lying in bed with an erythematous LLE on which he had a cold pack  Body hair: Absence of chest hair. The patient states that this is not new. His pubic hair does not extend superiorly to the lower abdomen.  HEENT: NCAT. EOMI. No conjunctival hemorrhage. No scleral icterus. Oropharynx without thrush. Vision and hearing are grossly intact. Fair dentition.  Neck: Supple without adenopathy  Back: No costovertebral angle  tenderness or spinal tenderness.  Lungs: Clear to auscultation  Cardiac: RRR S1S2 without MGR  Abdomen: Normal bowel sounds, soft, non-tender. The right lower quadrant, inferiorly, has a healing area with a scab that is approximately 2 x 1 cm in size.  : Normal circumcised male genitalia. Normal testicular size without masses palpated.   Extremities: LLE with erythema from ankle to thigh. Superiorly, there is mild erythema that is above the upper border of the erythema that was drawn on the date of admission. At the lower border of the erythema, the erythema appears to be approximately 0.5 cm above the marking drawn on admission. There is notable LLE swelling and tenderness. The pre-patella bursa mon not have additional erythema over its skin and is not more tender than other areas about the knee joint. He is able to move the left knee and can do so more readily than was the case on admission. Scratches are present on both calves.   Neuro: AOX3. CN II-XII intact (I did not test gag or corneal reflex). Able to move all four extremities, sit up, and cooperate with the history and the examination.    Electronically signed by Aaron Padilla M.D.  11/21/2018 10:07 AM  --

## 2018-11-21 NOTE — PROGRESS NOTES
Orthopedic Surgery Progress Note    Subjective: NAEON. Pain, erythema, and motion improving in LLE. Most swelling is focal around the tibial tuberosity, where he landed when he fell. Still hurts to weight bear. Denies fever, chills, numbness/tingling.    Exam:  /75  Pulse 94  Temp 99.4  F (37.4  C) (Oral)  Resp 18  Wt 119.3 kg (263 lb 1.6 oz)  SpO2 97%  BMI 41.21 kg/m2  Gen: Awake, alert, NAD  Resp: breathing equal and non-labored  Extremities:  LLE: Erythema and warmth throughout lower leg, from the knee to the ankle, is somewhat improving and receded from the marked borders. Significant swelling around tibial tuberosity. No prepatellar bursitis. No TTP around patella or knee joint lines. Knee ROM 0-95. Able to do active straight leg raise. 5/5 EHL/FHL/TA/Gsc. SILT in s/s/dp/sp/t distributions. 2+ DP and PT pulses. Toes WWP, 2+ cap refill.    Labs:    Recent Labs  Lab 11/21/18  0534 11/20/18  0718 11/19/18  0718 11/18/18 2031 11/18/18  1242   WBC 10.5 11.4* 14.1*  --  17.8*   HGB 13.4 13.1* 13.7  --  15.2    315 260 233 269       Recent Labs  Lab 11/21/18  0534 11/20/18  0718 11/19/18  0718 11/18/18 2031 11/18/18  1242   NA  --  139  --   --  138   POTASSIUM  --  3.7  --   --  3.8   CHLORIDE  --  107  --   --  103   CO2  --  27  --   --  24   BUN  --  11  --   --  13   CR 0.63* 0.62* 0.65* 0.77 0.77   GLC  --  95  --   --  150*     CRP Inflammation   Date Value Ref Range Status   11/21/2018 98.0 (H) 0.0 - 8.0 mg/L Final       Assessment:   48 year old male with PMH of DM2 and obesity who presents with LLE cellulitis, especially around tibial tuberosity. Recovering appropriately on IV antibiotics. Downtrending CRP.    Plan:  Activity: WBAT BLE. ROM as tolerated.   Antibiotics: continue IV as recommended, transition to PO when able  Micro: Will continue to follow cultures x 2 weeks  Diet: ADAT  Pain: PO/IV meds. Transition to PO meds only as patient tolerates  DVT ppx: Pt on Lovenox per  Medicine  Imaging: films reviewed and are satisfactory. No further imaging need at this time.  Consults: Recommend ID consult for antibiotic selection/duration, if needed  Dispo: Per primary team.   Follow up: 3-4 weeks in outpatient Ortho as needed    Dulce Maria Grullonh  Orthopaedic PGY4  [phone] (pager)

## 2018-11-22 ENCOUNTER — APPOINTMENT (OUTPATIENT)
Dept: GENERAL RADIOLOGY | Facility: CLINIC | Age: 48
End: 2018-11-22
Payer: COMMERCIAL

## 2018-11-22 LAB
CRP SERPL-MCNC: 85.9 MG/L (ref 0–8)
ERYTHROCYTE [DISTWIDTH] IN BLOOD BY AUTOMATED COUNT: 12.5 % (ref 10–15)
GLUCOSE BLDC GLUCOMTR-MCNC: 116 MG/DL (ref 70–99)
GLUCOSE BLDC GLUCOMTR-MCNC: 169 MG/DL (ref 70–99)
GLUCOSE BLDC GLUCOMTR-MCNC: 92 MG/DL (ref 70–99)
GLUCOSE BLDC GLUCOMTR-MCNC: 93 MG/DL (ref 70–99)
GLUCOSE BLDC GLUCOMTR-MCNC: 97 MG/DL (ref 70–99)
HCT VFR BLD AUTO: 39.4 % (ref 40–53)
HGB BLD-MCNC: 13.2 G/DL (ref 13.3–17.7)
MCH RBC QN AUTO: 32.3 PG (ref 26.5–33)
MCHC RBC AUTO-ENTMCNC: 33.5 G/DL (ref 31.5–36.5)
MCV RBC AUTO: 96 FL (ref 78–100)
MRSA DNA SPEC QL NAA+PROBE: NEGATIVE
PLATELET # BLD AUTO: 318 10E9/L (ref 150–450)
RBC # BLD AUTO: 4.09 10E12/L (ref 4.4–5.9)
SPECIMEN SOURCE: NORMAL
WBC # BLD AUTO: 10 10E9/L (ref 4–11)

## 2018-11-22 PROCEDURE — 00000146 ZZHCL STATISTIC GLUCOSE BY METER IP

## 2018-11-22 PROCEDURE — 12000001 ZZH R&B MED SURG/OB UMMC

## 2018-11-22 PROCEDURE — 25000128 H RX IP 250 OP 636: Performed by: HOSPITALIST

## 2018-11-22 PROCEDURE — 85027 COMPLETE CBC AUTOMATED: CPT | Performed by: INTERNAL MEDICINE

## 2018-11-22 PROCEDURE — 36415 COLL VENOUS BLD VENIPUNCTURE: CPT | Performed by: INTERNAL MEDICINE

## 2018-11-22 PROCEDURE — 87640 STAPH A DNA AMP PROBE: CPT | Performed by: INTERNAL MEDICINE

## 2018-11-22 PROCEDURE — 99232 SBSQ HOSP IP/OBS MODERATE 35: CPT | Performed by: INTERNAL MEDICINE

## 2018-11-22 PROCEDURE — 25000132 ZZH RX MED GY IP 250 OP 250 PS 637: Performed by: INTERNAL MEDICINE

## 2018-11-22 PROCEDURE — 73030 X-RAY EXAM OF SHOULDER: CPT | Mod: RT

## 2018-11-22 PROCEDURE — 86140 C-REACTIVE PROTEIN: CPT | Performed by: INTERNAL MEDICINE

## 2018-11-22 PROCEDURE — 25000132 ZZH RX MED GY IP 250 OP 250 PS 637: Performed by: HOSPITALIST

## 2018-11-22 PROCEDURE — 87641 MR-STAPH DNA AMP PROBE: CPT | Performed by: INTERNAL MEDICINE

## 2018-11-22 RX ADMIN — IBUPROFEN 600 MG: 600 TABLET, FILM COATED ORAL at 19:41

## 2018-11-22 RX ADMIN — IBUPROFEN 600 MG: 600 TABLET, FILM COATED ORAL at 01:37

## 2018-11-22 RX ADMIN — TRAMADOL HYDROCHLORIDE 50 MG: 50 TABLET, COATED ORAL at 13:53

## 2018-11-22 RX ADMIN — LISINOPRIL 5 MG: 5 TABLET ORAL at 08:55

## 2018-11-22 RX ADMIN — VANCOMYCIN HYDROCHLORIDE 2000 MG: 10 INJECTION, POWDER, LYOPHILIZED, FOR SOLUTION INTRAVENOUS at 01:54

## 2018-11-22 RX ADMIN — ENOXAPARIN SODIUM 40 MG: 40 INJECTION SUBCUTANEOUS at 19:41

## 2018-11-22 RX ADMIN — DIPHENHYDRAMINE HYDROCHLORIDE 25 MG: 25 CAPSULE ORAL at 19:42

## 2018-11-22 RX ADMIN — DIPHENHYDRAMINE HYDROCHLORIDE 25 MG: 25 CAPSULE ORAL at 12:50

## 2018-11-22 RX ADMIN — VANCOMYCIN HYDROCHLORIDE 2000 MG: 10 INJECTION, POWDER, LYOPHILIZED, FOR SOLUTION INTRAVENOUS at 15:09

## 2018-11-22 ASSESSMENT — ACTIVITIES OF DAILY LIVING (ADL)
ADLS_ACUITY_SCORE: 10

## 2018-11-22 NOTE — PROGRESS NOTES
Orthopedic Surgery Progress Note    Subjective: NAEON. Pain, erythema, and motion improving in LLE. Most swelling is focal around the tibial tuberosity, where he landed when he fell. Still hurts to weight bear. Denies fever, chills, numbness/tingling.    Exam:  /90 (BP Location: Right arm)  Pulse 77  Temp 97  F (36.1  C) (Oral)  Resp 16  Wt 119.3 kg (263 lb 1.6 oz)  SpO2 97%  BMI 41.21 kg/m2  Gen: Awake, alert, NAD  Resp: breathing equal and non-labored  Extremities:  LLE: Erythema and warmth throughout lower leg, from the knee to the ankle, is somewhat improving and receded from the marked borders. Significant swelling around tibial tuberosity. No prepatellar bursitis. No TTP around patella or knee joint lines. Knee ROM 0-95. Able to do active straight leg raise. 5/5 EHL/FHL/TA/Gsc. SILT in s/s/dp/sp/t distributions. 2+ DP and PT pulses. Toes WWP, 2+ cap refill.    Labs:    Recent Labs  Lab 11/22/18  0527 11/21/18  0534 11/20/18  0718 11/19/18  0718   WBC 10.0 10.5 11.4* 14.1*   HGB 13.2* 13.4 13.1* 13.7    297 315 260       Recent Labs  Lab 11/21/18  0534 11/20/18  0718 11/19/18  0718 11/18/18  2031 11/18/18  1242   NA  --  139  --   --  138   POTASSIUM  --  3.7  --   --  3.8   CHLORIDE  --  107  --   --  103   CO2  --  27  --   --  24   BUN  --  11  --   --  13   CR 0.63* 0.62* 0.65* 0.77 0.77   GLC  --  95  --   --  150*     CRP Inflammation   Date Value Ref Range Status   11/22/2018 85.9 (H) 0.0 - 8.0 mg/L Final       Assessment:   48 year old male with PMH of DM2 and obesity who presents with LLE cellulitis, especially around tibial tuberosity. Recovering appropriately on IV antibiotics. Downtrending CRP.    Plan:  Activity: WBAT BLE. ROM as tolerated.   Antibiotics: continue IV as recommended, transition to PO when able.  Micro: Will continue to follow cultures x 2 weeks. BCx NGTD.  Diet: ADAT  Pain: PO/IV meds. Transition to PO meds only as patient tolerates  DVT ppx: Pt on Lovenox per  Medicine  Imaging: films reviewed and are satisfactory. No further imaging need at this time.  Consults: Recommend ID consult for antibiotic selection/duration, if needed  Dispo: Recommend one more day of IV abx, then discharge on 14 days of PO TMP-SMX for possible MRSA. Likely OK to discharge tomorrow per primary team.  Follow up: 3-4 weeks in outpatient Ortho as needed    Dulce Maria Grullonh  Orthopaedic PGY4  [phone] (pager)

## 2018-11-22 NOTE — PROGRESS NOTES
[facility], [place]   Hospitalist Daily Progress Note                                                 Date of Admission:2018  ___________________________________  INTERVAL HISTORY (24 Hrs)/SUBJECTIVE:   Last 24 hr events, care team notes reviewed.     States doing better.     L Leg cellulitis: slowly improving. Most swelling, erythema, pain: over L tibial tuberosity.     Denies fever or chills at current.   No cough or cp or sob.   No LH or dizziness.   No NV or pain abdomen.   No dysuria or bowel complaint.   No new sensory or motor complaint.   No new rash.    No other new or acute medical concern      ROS: 4 point ROS neg other than the symptoms noted above in the interval history.    OBJECTIVE :   VITALS:    Temp:  [97  F (36.1  C)-99.4  F (37.4  C)] 97  F (36.1  C)  Pulse:  [73-91] 77  Resp:  [15-16] 16  BP: (141-161)/(87-95) 141/90  SpO2:  [93 %-98 %] 97 %    Temp (24hrs), Av.7  F (36.5  C), Min:97  F (36.1  C), Max:99.4  F (37.4  C)    Wt Readings from Last 5 Encounters:   18 119.3 kg (263 lb 1.6 oz)   18 117.9 kg (260 lb)   18 118.4 kg (261 lb)   18 118.2 kg (260 lb 8 oz)   18 123.8 kg (273 lb)      No intake or output data in the 24 hours ending 18 1534    PHYSICAL EXAM:  General: alert, interactive, NAD  HEENT: AT/NC, anicteric, Moist MM  Respi/Chest: Non labored. Clear BL  CVS/Heart: S1S2 regular  Gi/Abd: Soft, non tender, non distended  MSK/Ext: Distal pulses 2+.     Neuro: AO x 4,   Skin: L LE Cellulitis: erythema, swelling, pain - improving,  Significant swelling & erythema - around tibial tuberosity.  ROM L Knee intact.      Medications:   I have reviewed this patient's current medications.      Data:   All laboratory and imaging data in the past 24 hours reviewed:    LABS:  CMP    Recent Labs  Lab 18  0534 18  0718 1818 18  1242   NA  --  139  --   --  138   POTASSIUM  --  3.7  --    --  3.8   CHLORIDE  --  107  --   --  103   CO2  --  27  --   --  24   ANIONGAP  --  5  --   --  11   GLC  --  95  --   --  150*   BUN  --  11  --   --  13   CR 0.63* 0.62* 0.65* 0.77 0.77   GFRESTIMATED >90 >90 >90 >90 >90   GFRESTBLACK >90 >90 >90 >90 >90   DIONY  --  9.0  --   --  9.2     CBC    Recent Labs  Lab 11/22/18  0527 11/21/18  0534 11/20/18  0718 11/19/18  0718   WBC 10.0 10.5 11.4* 14.1*   RBC 4.09* 4.09* 4.01* 4.20*   HGB 13.2* 13.4 13.1* 13.7   HCT 39.4* 39.0* 38.8* 40.4   MCV 96 95 97 96   MCH 32.3 32.8 32.7 32.6   MCHC 33.5 34.4 33.8 33.9   RDW 12.5 12.4 12.7 12.7    297 315 260     INRNo lab results found in last 7 days.  Unresulted Labs Ordered in the Past 30 Days of this Admission     Date and Time Order Name Status Description    11/18/2018 1901 Blood culture Preliminary     11/18/2018 1901 Blood culture Preliminary           CRP: 157--> 11/20/2018: 134.     No results found for this or any previous visit (from the past 24 hour(s)).      ASSESSMENT & PLAN :    Aaron Mercado is a 48 year old male with ho DM, HT, Obesity with L LE cellulitis. ? L Pre patellar bursitis     # Cellulitis left lower extremity,? Left knee prepatellar bursitis (following a fall.   Hx of fall ~2 weeks ago on L knee. DM +  He is able to bend the knee. Less likely it involves the knee. Xray and USG leg in ED was negative.  VSS. Non septic.   CRP trending down  Fever improving.   BC: NGTD     -Started on iv Vanc and Rocephin-  11.21: ID consultation. d.w ID. Discontinue Ceftriaxone. Check mrsa pcr.--Pending.   -Elevate the leg    -Pain control: acetaminophen, ibuprofen, Tramadol prn.   - glucose control     *11/20/2018: Ortho consultation : recs reviewed. Appreciate input and following.   11/22/2018: Per Ortho: ct iv abx for now.      # DM: type 2. HbA1c 5.0   Uses metformin at home (dose unsure)- On hold.    Ct sliding scale insulin.       Recent Labs  Lab 11/22/18  1246 11/22/18  0828 11/22/18  0157 11/21/18  2232  11/21/18  1818 11/21/18  0814  11/20/18  0718  11/18/18  1242   GLC  --   --   --   --   --   --   --  95  --  150*   BGM 92 97 116* 162* 144* 134*  < >  --   < >  --    < > = values in this interval not displayed.     # HTN: has not taken lisinopril on one month. He ran out of medications. Monitor BP   - resume @ 5 mg daily 11.20    # Obesity: Recommend weight loss     Diet :   Active Diet Order      Regular Diet Adult     DVT Prophylaxis: Enoxaparin (Lovenox) SQ  Code Status: Full Code     Disposition: Expected discharge in 1-2 days once infection better      Plan discussed with patient, RN        Ashkan Carmen MD   Hospitalist (Internal Medicine)  Pager: [phone].

## 2018-11-23 LAB
GLUCOSE BLDC GLUCOMTR-MCNC: 102 MG/DL (ref 70–99)
GLUCOSE BLDC GLUCOMTR-MCNC: 116 MG/DL (ref 70–99)
GLUCOSE BLDC GLUCOMTR-MCNC: 130 MG/DL (ref 70–99)
GLUCOSE BLDC GLUCOMTR-MCNC: 134 MG/DL (ref 70–99)
GLUCOSE BLDC GLUCOMTR-MCNC: 139 MG/DL (ref 70–99)
GLUCOSE BLDC GLUCOMTR-MCNC: 210 MG/DL (ref 70–99)
VANCOMYCIN SERPL-MCNC: 12.1 MG/L

## 2018-11-23 PROCEDURE — 25000132 ZZH RX MED GY IP 250 OP 250 PS 637: Performed by: HOSPITALIST

## 2018-11-23 PROCEDURE — 00000146 ZZHCL STATISTIC GLUCOSE BY METER IP

## 2018-11-23 PROCEDURE — 25000128 H RX IP 250 OP 636: Performed by: HOSPITALIST

## 2018-11-23 PROCEDURE — 25000132 ZZH RX MED GY IP 250 OP 250 PS 637: Performed by: INTERNAL MEDICINE

## 2018-11-23 PROCEDURE — 25000131 ZZH RX MED GY IP 250 OP 636 PS 637: Performed by: HOSPITALIST

## 2018-11-23 PROCEDURE — 36415 COLL VENOUS BLD VENIPUNCTURE: CPT | Performed by: HOSPITALIST

## 2018-11-23 PROCEDURE — 80202 ASSAY OF VANCOMYCIN: CPT | Performed by: HOSPITALIST

## 2018-11-23 PROCEDURE — 99232 SBSQ HOSP IP/OBS MODERATE 35: CPT | Performed by: INTERNAL MEDICINE

## 2018-11-23 PROCEDURE — 12000001 ZZH R&B MED SURG/OB UMMC

## 2018-11-23 PROCEDURE — 40000893 ZZH STATISTIC PT IP EVAL DEFER

## 2018-11-23 RX ORDER — SODIUM CHLORIDE 9 MG/ML
INJECTION, SOLUTION INTRAVENOUS
Status: DISPENSED
Start: 2018-11-23 | End: 2018-11-24

## 2018-11-23 RX ADMIN — DIPHENHYDRAMINE HYDROCHLORIDE 25 MG: 25 CAPSULE ORAL at 13:01

## 2018-11-23 RX ADMIN — IBUPROFEN 600 MG: 600 TABLET, FILM COATED ORAL at 05:54

## 2018-11-23 RX ADMIN — VANCOMYCIN HYDROCHLORIDE 2000 MG: 10 INJECTION, POWDER, LYOPHILIZED, FOR SOLUTION INTRAVENOUS at 01:19

## 2018-11-23 RX ADMIN — INSULIN ASPART 1 UNITS: 100 INJECTION, SOLUTION INTRAVENOUS; SUBCUTANEOUS at 21:40

## 2018-11-23 RX ADMIN — VANCOMYCIN HYDROCHLORIDE 2000 MG: 10 INJECTION, POWDER, LYOPHILIZED, FOR SOLUTION INTRAVENOUS at 13:08

## 2018-11-23 RX ADMIN — LISINOPRIL 5 MG: 5 TABLET ORAL at 08:22

## 2018-11-23 RX ADMIN — IBUPROFEN 600 MG: 600 TABLET, FILM COATED ORAL at 21:50

## 2018-11-23 RX ADMIN — ENOXAPARIN SODIUM 40 MG: 40 INJECTION SUBCUTANEOUS at 21:40

## 2018-11-23 RX ADMIN — IBUPROFEN 600 MG: 600 TABLET, FILM COATED ORAL at 15:31

## 2018-11-23 ASSESSMENT — ACTIVITIES OF DAILY LIVING (ADL)
ADLS_ACUITY_SCORE: 10

## 2018-11-23 NOTE — PROGRESS NOTES
[facility], [place]   Hospitalist Daily Progress Note                                                 Date of Admission:2018  ___________________________________  INTERVAL HISTORY (24 Hrs)/SUBJECTIVE:   Last 24 hr events, care team notes reviewed.     Continues to feel better.      L Leg cellulitis: slowly improving. Most swelling, erythema, pain: over L tibial tuberosity.     Denies fever or chills at current.   No cough or cp or sob.   No LH or dizziness.   No NV or pain abdomen.   No dysuria or bowel complaint.   No new sensory or motor complaint.   No new rash.    No other new or acute medical concern      ROS: 4 point ROS neg other than the symptoms noted above in the interval history.    OBJECTIVE :   VITALS:    Temp:  [96.9  F (36.1  C)-99.4  F (37.4  C)] 96.9  F (36.1  C)  Pulse:  [80-98] 82  Resp:  [16-18] 18  BP: (130-149)/(76-92) 149/76  SpO2:  [96 %-100 %] 99 %    Temp (24hrs), Av  F (36.7  C), Min:96.9  F (36.1  C), Max:99.4  F (37.4  C)      Wt Readings from Last 5 Encounters:   18 119.3 kg (263 lb 1.6 oz)   18 117.9 kg (260 lb)   18 118.4 kg (261 lb)   18 118.2 kg (260 lb 8 oz)   18 123.8 kg (273 lb)      No intake or output data in the 24 hours ending 18 1534    PHYSICAL EXAM:  General: alert, interactive, NAD  HEENT: AT/NC, anicteric, Moist MM  Respi/Chest: Non labored. Clear BL  CVS/Heart: S1S2 regular  Gi/Abd: Soft, non tender, non distended  MSK/Ext: Distal pulses 2+.     Neuro: AO x 4,   Skin: L LE Cellulitis: erythema, swelling, pain - improving,  Significant swelling & erythema - around tibial tuberosity.  ROM L Knee intact.    R shoulder : TTP + see ortho exam    Medications:   I have reviewed this patient's current medications.      Data:   All laboratory and imaging data in the past 24 hours reviewed:    LABS:  CMP    Recent Labs  Lab 18  0534 18  0718 18  0718 18  2031 18  1242   NA   --  139  --   --  138   POTASSIUM  --  3.7  --   --  3.8   CHLORIDE  --  107  --   --  103   CO2  --  27  --   --  24   ANIONGAP  --  5  --   --  11   GLC  --  95  --   --  150*   BUN  --  11  --   --  13   CR 0.63* 0.62* 0.65* 0.77 0.77   GFRESTIMATED >90 >90 >90 >90 >90   GFRESTBLACK >90 >90 >90 >90 >90   DIONY  --  9.0  --   --  9.2     CBC    Recent Labs  Lab 11/22/18  0527 11/21/18  0534 11/20/18  0718 11/19/18  0718   WBC 10.0 10.5 11.4* 14.1*   RBC 4.09* 4.09* 4.01* 4.20*   HGB 13.2* 13.4 13.1* 13.7   HCT 39.4* 39.0* 38.8* 40.4   MCV 96 95 97 96   MCH 32.3 32.8 32.7 32.6   MCHC 33.5 34.4 33.8 33.9   RDW 12.5 12.4 12.7 12.7    297 315 260     INRNo lab results found in last 7 days.  Unresulted Labs Ordered in the Past 30 Days of this Admission     Date and Time Order Name Status Description    11/18/2018 1901 Blood culture Preliminary     11/18/2018 1901 Blood culture Preliminary           CRP: 157--> 134 --> 85.9    No results found for this or any previous visit (from the past 24 hour(s)).      ASSESSMENT & PLAN :    Aaron Mercado is a 48 year old male with ho DM, HT, Obesity with L LE cellulitis. ? L Pre patellar bursitis     # Cellulitis left lower extremity,? Left knee prepatellar bursitis (following a fall.   Hx of fall ~2 weeks ago on L knee. DM +  He is able to bend the knee. Less likely it involves the knee. Xray and USG leg in ED was negative.  VSS. Non septic. CRP trending down. Fever improving.   BC: NGTD     -Started on iv Vanc and Rocephin-  11.21: ID consultation. d.w ID. Discontinue Ceftriaxone. Check mrsa pcr.--Pending.   -Elevate the leg    -Pain control: acetaminophen, ibuprofen, Tramadol prn.   - glucose control     *11/20/2018: Ortho consultation : recs reviewed. Appreciate input and following.   11/22/2018: Per Ortho: ct iv abx for now.      # DM: type 2. HbA1c 5.0   Uses metformin at home (dose unsure)- On hold.    Ct sliding scale insulin.       Recent Labs  Lab 11/23/18  1304  11/23/18  0755 11/23/18  0159 11/22/18  2237 11/22/18  1825 11/22/18  1246  11/20/18  0718  11/18/18  1242   GLC  --   --   --   --   --   --   --  95  --  150*   * 139* 134* 169* 93 92  < >  --   < >  --    < > = values in this interval not displayed.     # HTN: has not taken lisinopril on one month. He ran out of medications. Monitor BP   - resume @ 5 mg daily 11.20    # Obesity: Recommend weight loss    # R shoulder pain:Ortho:  likely due to rotator cuff strain and AC joint arthritis.   - PT.    Follow up: 3-4 weeks in outpatient Ortho as needed      Diet :   Active Diet Order      Regular Diet Adult     DVT Prophylaxis: Enoxaparin (Lovenox) SQ  Code Status: Full Code     Disposition: Expected discharge in 1-2 days once infection better      Plan discussed with patient, RN, SW, CC        Ashkan Carmen MD   Hospitalist (Internal Medicine)  Pager: [phone].

## 2018-11-23 NOTE — PROGRESS NOTES
Orthopedic Surgery Progress Note    Subjective: NAEON. Pain, erythema, and motion improving in LLE. Most swelling is focal around the tibial tuberosity, where he landed when he fell. Feels like he made a great deal of improvement in past 24 hrs. Had one episode of elevated temperature and chills last night.    R shoulder pain/clicking started 2 weeks ago after he fell, the same event that caused his current cellulitis. Endorses pain just below the acromion, worse with overhead motion at shoulder level. Denies weakness, numbness, or tingling.    Exam:  /76 (BP Location: Right arm)  Pulse 82  Temp 96.9  F (36.1  C) (Oral)  Resp 18  Wt 119.3 kg (263 lb 1.6 oz)  SpO2 99%  BMI 41.21 kg/m2  Gen: Awake, alert, NAD  Resp: breathing equal and non-labored  Extremities:  RUE: TTP just below the acromion and at AC joint. Palpable crepitus at AC joint with overhead motion. Shoulder AROM: , abduction 160 (has to pause at 90 deg due to pain), ER 75, IR back pocket. 5/5 resisted abduction, FF, IR. 4/5 resisted ER. Pain with Neer's, [place]'s, and Hawkin's. Negative belly press, bear hug, lift-off. SILT m/r/u/a. 2+ radial. Hand WWP.  LLE: Erythema and warmth in the lower leg are markedly improving and receded from the marked borders. Erythema, warmth, and swelling around distal thigh, knee, and tibial tuberosity. No prepatellar bursitis. No TTP around patella or knee joint lines. Knee ROM 0-100. Able to do active straight leg raise. 5/5 EHL/FHL/TA/Gsc. SILT in s/s/dp/sp/t distributions. 2+ DP and PT pulses. Toes WWP, 2+ cap refill.    Labs:    Recent Labs  Lab 11/22/18  0527 11/21/18  0534 11/20/18  0718 11/19/18  0718   WBC 10.0 10.5 11.4* 14.1*   HGB 13.2* 13.4 13.1* 13.7    297 315 260       Recent Labs  Lab 11/21/18  0534 11/20/18 0718 11/19/18 0718 11/18/18 2031 11/18/18  1242   NA  --  139  --   --  138   POTASSIUM  --  3.7  --   --  3.8   CHLORIDE  --  107  --   --  103   CO2  --  27  --   --  24    BUN  --  11  --   --  13   CR 0.63* 0.62* 0.65* 0.77 0.77   GLC  --  95  --   --  150*     CRP Inflammation   Date Value Ref Range Status   11/22/2018 85.9 (H) 0.0 - 8.0 mg/L Final       IMAGING: Right AC joint arthritis. No other bony abnormalities in R shoulder.    Assessment:   48 year old male with PMH of DM2 and obesity who presents with LLE cellulitis, especially around tibial tuberosity. Recovering appropriately on IV antibiotics. Downtrending CRP.   R shoulder pain likely due to rotator cuff strain and AC joint arthritis.     Plan:  Activity: WBAT BLE. ROM as tolerated.   Antibiotics: continue IV as recommended, transition to PO when able.  Micro: Will continue to follow cultures x 2 weeks. BCx NGTD.  Diet: ADAT  Pain: PO/IV meds. Transition to PO meds only as patient tolerates  PT: Start PT for R shoulder rotator cuff strengthening/stretching exercises.  DVT ppx: Pt on Lovenox per Medicine  Imaging: films reviewed and are satisfactory. No further imaging need at this time.  Consults: Recommend ID consult for antibiotic selection/duration, if needed  Dispo: Recommend one more day of IV abx, then discharge on 14 days of PO TMP-SMX for possible MRSA. Possibly OK to discharge tomorrow per primary team.  Follow up: 3-4 weeks in outpatient Ortho as needed    Dulce Maria Weems  Orthopaedic PGY4  [phone] (pager)

## 2018-11-23 NOTE — PHARMACY-VANCOMYCIN DOSING SERVICE
Pharmacy Vancomycin Note  Date of Service 2018  Patient's  1970   48 year old, male    Indication: Skin and Soft Tissue Infection  Goal Trough Level: 10-15 mg/L  Day of Therapy: Since 18  Current Vancomycin regimen:  2000 mg IV q12h    Current estimated CrCl = Estimated Creatinine Clearance: 181.1 mL/min (based on Cr of 0.63).    Creatinine for last 3 days  2018:  5:34 AM Creatinine 0.63 mg/dL    Recent Vancomycin Levels (past 3 days)  2018: 10:13 AM Vancomycin Level 7.5 mg/L  2018: 12:08 PM Vancomycin Level 12.1 mg/L    Vancomycin IV Administrations (past 72 hours)                   vancomycin (VANCOCIN) 2,000 mg in sodium chloride 0.9 % 500 mL intermittent infusion (mg) 2,000 mg New Bag 18 0119     2,000 mg New Bag 18 1509     2,000 mg New Bag  0154                Nephrotoxins and other renal medications (Future)    Start     Dose/Rate Route Frequency Ordered Stop    18 2300  vancomycin (VANCOCIN) 2,000 mg in sodium chloride 0.9 % 500 mL intermittent infusion      2,000 mg  over 2 Hours Intravenous EVERY 12 HOURS 18 1113      18 1545  lisinopril (PRINIVIL/ZESTRIL) tablet 5 mg      5 mg Oral DAILY 18 1542      18 1901  ibuprofen (ADVIL/MOTRIN) tablet 600 mg      600 mg Oral EVERY 6 HOURS PRN 18 1901               Contrast Orders - past 72 hours     None          Interpretation of levels and current regimen:  Trough level is  Therapeutic    Has serum creatinine changed > 50% in last 72 hours: No    Urine output:  unable to determine    Renal Function: Stable    Plan:  1.  Continue Current Dose  2.  Pharmacy will check trough levels as appropriate in 1-3 Days.    3. Serum creatinine levels will be ordered a minimum of twice weekly.      Cassandra Youngblood, PharmD, BCPS          .

## 2018-11-24 VITALS
WEIGHT: 263.1 LBS | DIASTOLIC BLOOD PRESSURE: 97 MMHG | TEMPERATURE: 96.3 F | BODY MASS INDEX: 41.21 KG/M2 | OXYGEN SATURATION: 97 % | HEART RATE: 82 BPM | SYSTOLIC BLOOD PRESSURE: 139 MMHG | RESPIRATION RATE: 18 BRPM

## 2018-11-24 LAB
BACTERIA SPEC CULT: NO GROWTH
BACTERIA SPEC CULT: NO GROWTH
CREAT SERPL-MCNC: 0.69 MG/DL (ref 0.66–1.25)
CRP SERPL-MCNC: 40.4 MG/L (ref 0–8)
GFR SERPL CREATININE-BSD FRML MDRD: >90 ML/MIN/1.7M2
GLUCOSE BLDC GLUCOMTR-MCNC: 108 MG/DL (ref 70–99)
GLUCOSE BLDC GLUCOMTR-MCNC: 118 MG/DL (ref 70–99)
GLUCOSE BLDC GLUCOMTR-MCNC: 133 MG/DL (ref 70–99)
Lab: NORMAL
Lab: NORMAL
PLATELET # BLD AUTO: 383 10E9/L (ref 150–450)
SPECIMEN SOURCE: NORMAL
SPECIMEN SOURCE: NORMAL
WBC # BLD AUTO: 8.2 10E9/L (ref 4–11)

## 2018-11-24 PROCEDURE — 85049 AUTOMATED PLATELET COUNT: CPT | Performed by: HOSPITALIST

## 2018-11-24 PROCEDURE — 25000128 H RX IP 250 OP 636: Performed by: HOSPITALIST

## 2018-11-24 PROCEDURE — 36415 COLL VENOUS BLD VENIPUNCTURE: CPT | Performed by: HOSPITALIST

## 2018-11-24 PROCEDURE — 25000132 ZZH RX MED GY IP 250 OP 250 PS 637: Performed by: HOSPITALIST

## 2018-11-24 PROCEDURE — 25000132 ZZH RX MED GY IP 250 OP 250 PS 637: Performed by: INTERNAL MEDICINE

## 2018-11-24 PROCEDURE — 82565 ASSAY OF CREATININE: CPT | Performed by: HOSPITALIST

## 2018-11-24 PROCEDURE — 99239 HOSP IP/OBS DSCHRG MGMT >30: CPT | Performed by: INTERNAL MEDICINE

## 2018-11-24 PROCEDURE — 00000146 ZZHCL STATISTIC GLUCOSE BY METER IP

## 2018-11-24 PROCEDURE — 90686 IIV4 VACC NO PRSV 0.5 ML IM: CPT | Performed by: HOSPITALIST

## 2018-11-24 PROCEDURE — 85048 AUTOMATED LEUKOCYTE COUNT: CPT | Performed by: HOSPITALIST

## 2018-11-24 PROCEDURE — 86140 C-REACTIVE PROTEIN: CPT | Performed by: HOSPITALIST

## 2018-11-24 RX ORDER — SULFAMETHOXAZOLE/TRIMETHOPRIM 800-160 MG
1 TABLET ORAL 2 TIMES DAILY
Qty: 16 TABLET | Refills: 0 | Status: SHIPPED | OUTPATIENT
Start: 2018-11-24 | End: 2018-11-27

## 2018-11-24 RX ORDER — ACETAMINOPHEN 325 MG/1
650 TABLET ORAL EVERY 4 HOURS PRN
Qty: 100 TABLET | Refills: 0 | Status: SHIPPED | OUTPATIENT
Start: 2018-11-24 | End: 2021-04-20

## 2018-11-24 RX ORDER — LISINOPRIL 5 MG/1
5 TABLET ORAL DAILY
Qty: 30 TABLET | Refills: 3 | Status: SHIPPED | OUTPATIENT
Start: 2018-11-25 | End: 2019-10-29

## 2018-11-24 RX ORDER — IBUPROFEN 600 MG/1
600 TABLET, FILM COATED ORAL EVERY 6 HOURS PRN
Qty: 40 TABLET | Refills: 0 | Status: SHIPPED | OUTPATIENT
Start: 2018-11-24 | End: 2018-12-14

## 2018-11-24 RX ORDER — CEPHALEXIN 500 MG/1
500 CAPSULE ORAL 3 TIMES DAILY
Qty: 24 CAPSULE | Refills: 0 | Status: SHIPPED | OUTPATIENT
Start: 2018-11-24 | End: 2018-12-02

## 2018-11-24 RX ADMIN — IBUPROFEN 600 MG: 600 TABLET, FILM COATED ORAL at 07:33

## 2018-11-24 RX ADMIN — DIPHENHYDRAMINE HYDROCHLORIDE 25 MG: 25 CAPSULE ORAL at 12:12

## 2018-11-24 RX ADMIN — DIPHENHYDRAMINE HYDROCHLORIDE 25 MG: 25 CAPSULE ORAL at 01:00

## 2018-11-24 RX ADMIN — VANCOMYCIN HYDROCHLORIDE 2000 MG: 10 INJECTION, POWDER, LYOPHILIZED, FOR SOLUTION INTRAVENOUS at 12:07

## 2018-11-24 RX ADMIN — INFLUENZA A VIRUS A/MICHIGAN/45/2015 X-275 (H1N1) ANTIGEN (FORMALDEHYDE INACTIVATED), INFLUENZA A VIRUS A/SINGAPORE/INFIMH-16-0019/2016 IVR-186 (H3N2) ANTIGEN (FORMALDEHYDE INACTIVATED), INFLUENZA B VIRUS B/PHUKET/3073/2013 ANTIGEN (FORMALDEHYDE INACTIVATED), AND INFLUENZA B VIRUS B/MARYLAND/15/2016 BX-69A ANTIGEN (FORMALDEHYDE INACTIVATED) 0.5 ML: 15; 15; 15; 15 INJECTION, SUSPENSION INTRAMUSCULAR at 12:01

## 2018-11-24 RX ADMIN — LISINOPRIL 5 MG: 5 TABLET ORAL at 07:56

## 2018-11-24 RX ADMIN — VANCOMYCIN HYDROCHLORIDE 2000 MG: 10 INJECTION, POWDER, LYOPHILIZED, FOR SOLUTION INTRAVENOUS at 01:00

## 2018-11-24 ASSESSMENT — ACTIVITIES OF DAILY LIVING (ADL)
ADLS_ACUITY_SCORE: 10

## 2018-11-24 NOTE — PROGRESS NOTES
Orthopedic Surgery Progress Note    Subjective: NAEON. Pain, erythema, and motion continue to improve in LLE. Most swelling is focal around the tibial tuberosity, where he landed when he fell. No fever/chills overnight. R shoulder evaluated briefly by PT, who deemed him more suitable for outpatient PT referral by his PCP. Discussed with patient that he may discharge home today with PO antibiotics x 2 weeks. Recommended f/u with PCP next week (to evaluate the cellulitis and ask for outpatien PT referral for his R shoulder) and with one of the orthopaedic medical (non-operative) providers in 2 weeks. He agreed with the plan.    Exam:  /88 (BP Location: Left arm)  Pulse 82  Temp 97  F (36.1  C) (Oral)  Resp 18  Wt 119.3 kg (263 lb 1.6 oz)  SpO2 100%  BMI 41.21 kg/m2  Gen: Awake, alert, NAD  Resp: breathing equal and non-labored  Extremities:  RUE: TTP just below the acromion and at AC joint. Palpable crepitus at AC joint with overhead motion. Shoulder AROM: , abduction 160 (has to pause at 90 deg due to pain), ER 75, IR back pocket. 5/5 resisted abduction, FF, IR. 4/5 resisted ER. Pain with Neer's, [place]'s, and Hawkin's. Negative belly press, bear hug, lift-off. SILT m/r/u/a. 2+ radial. Hand WWP.  LLE: Erythema and warmth in the lower leg are markedly improving and receded from the marked borders. Improved erythema, warmth, and 1+ pitting edema around tibial tuberosity. No prepatellar bursitis. No TTP around patella or knee joint lines. Knee ROM 0-100. Able to do active straight leg raise. 5/5 EHL/FHL/TA/Gsc. SILT in s/s/dp/sp/t distributions. 2+ DP and PT pulses. Toes WWP, 2+ cap refill.    Labs:    Recent Labs  Lab 11/24/18  0533 11/22/18  0527 11/21/18  0534 11/20/18  0718 11/19/18  0718   WBC 8.2 10.0 10.5 11.4* 14.1*   HGB  --  13.2* 13.4 13.1* 13.7    318 297 315 260       Recent Labs  Lab 11/24/18  0533 11/21/18  0534 11/20/18  0718 11/19/18  0718  11/18/18  1242   NA  --   --  139  --    --  138   POTASSIUM  --   --  3.7  --   --  3.8   CHLORIDE  --   --  107  --   --  103   CO2  --   --  27  --   --  24   BUN  --   --  11  --   --  13   CR 0.69 0.63* 0.62* 0.65*  < > 0.77   GLC  --   --  95  --   --  150*   < > = values in this interval not displayed.  CRP Inflammation   Date Value Ref Range Status   11/24/2018 40.4 (H) 0.0 - 8.0 mg/L Final       IMAGING: Right AC joint arthritis. No other bony abnormalities in R shoulder.    Assessment:   48 year old male with PMH of DM2 and obesity who presents with LLE cellulitis, especially around tibial tuberosity. Recovering appropriately on IV antibiotics. Downtrending CRP.   R shoulder pain likely due to rotator cuff strain and AC joint arthritis.     Plan:  Activity: WBAT BLE. ROM as tolerated.   Antibiotics: continue IV as recommended, transition to PO when able.  Micro: Will continue to follow cultures x 2 weeks. BCx NGTD.  Diet: ADAT  Pain: PO/IV meds. Transition to PO meds only as patient tolerates  PT: Start PT for R shoulder rotator cuff strengthening/stretching exercises.  DVT ppx: Pt on Lovenox per Medicine  Imaging: films reviewed and are satisfactory. No further imaging need at this time.  Consults: Recommend ID consult for antibiotic selection/duration, if needed  Dispo: OK to discharge today on 14 days of PO Bactrim  Follow up: 1 week with PCP, 2 weeks with non-operative Ortho    Dulce Maria Weems  Orthopaedic PGY4  [phone] (pager)

## 2018-11-24 NOTE — PROGRESS NOTES
[facility] [address]  [address]  [place] [address]  [phone]      11/24/2018    Re: Aaron Mercado      TO WHOM IT MAY CONCERN:    Aaron Mercado  was admitted at our hospital on 11/18/2018.  Aaron Mercado is being discharged today 11/24/2018.     Please feel free to contact me should you have any further question.     Thank you.            Ashkan Carmen MD  Hospitalist.   Department of Internal Medicine.

## 2018-11-24 NOTE — DISCHARGE SUMMARY
Discharge Summary    Aaron Mercado MRN# [id number]   YOB: 1970 Age: 48 year old     Date of Admission:  11/18/2018  Date of Discharge:  11/24/2018  3:08 PM  Admitting Physician:  Kathya Borrego MD  Discharge Physician:  Ashkan Carmen MD   Discharging Service:  Internal Medicine     Primary Provider: Quoc Flynn           Discharge Diagnosis:      Cellulitis of left lower extremity   suspect L knee pre patellar bursitis    DM type 2  Hypertension goal BP (blood pressure)   Obesity  R shoulder pain- AC joint arthritis ? Rotator cuff strain.              Procedures:   No procedures performed during this admission             Consultations:   Consultation during this admission received from infectious disease and orthopedics               Brief History of Illness:   For details please refer to H and P dated 11/18/2018  Briefly,    Aaron Mercado is a 48 year old male with ho DM, HT, Obesity with L LE cellulitis. ? L Pre patellar bursitis                Hospital Course:   Issues:     # Cellulitis left lower extremity,? Left knee prepatellar bursitis (following a fall.   Hx of fall ~2 weeks ago on L knee. DM +  He is able to bend the knee. Less likely it involves the knee. Xray and USG leg in ED was negative.  VSS. Non septic. CRP trending down. Fever resolved.   BC: NGTD      -Started on iv Vanc and Rocephin-  11.21: ID consultation. d.w ID. Discontinue Ceftriaxone. Check mrsa pcr.--neg.     *11/20/2018: Ortho consultation : recs reviewed. Appreciate input and following.   WBAT BLE. ROM as tolerated.     11/24/2018: cellulitis continues to improve slowly. Per Ortho okay to discharge on oral antibiotics.   Follow-up with PCP in 1 week.   Follow-up with ORtho 2 weeks.     11/24/2018  D.w ID:   Will discharge on oral Keflex and Bactrim for total 2 weeks course. Pt to follow-up with pcp before done with abx. Final course per PCP    -Pain control: acetaminophen, ibuprofen, Tramadol  prn.   -Elevate the leg  - glucose control            # DM: type 2. HbA1c 5.0   Uses metformin at home (dose unsure)- On hold. Resume after discharge.    Ct sliding scale insulin inpatient.         Recent Labs  Lab 18  1304 18  0755 18  0159 18  2237 18  1825 18  1246   18  0718   18  1242   GLC  --   --   --   --   --   --   --  95  --  150*   * 139* 134* 169* 93 92  < >  --   < >  --    < > = values in this interval not displayed.      # HTN: has not taken lisinopril on one month. He ran out of medications. Monitor BP   - resume @ 5 mg daily    -continue.      # Obesity: Recommend weight loss     # R shoulder pain:Ortho:  likely due to rotator cuff strain and AC joint arthritis. XR: no acute fracture or dislocation.   - PT consulted. Outpatient therapy.    Follow up: 3-4 weeks in outpatient Ortho as needed      DVT Prophylaxis: Enoxaparin (Lovenox) subcutaneous inpatient.   Code Status: Full Code               Discharge Day Exam:    Interval/Subjective:     States doing well  Cellulitis - L LE improving.   Denies fever or chills.   No cough or cp or sob.   No LH or dizziness.   No NV or pain abdomen.   No dysuria or bowel complaint.   No new sensory or motor complaint.       No other new or acute medical concern    Blood pressure (!) 139/97, pulse 82, temperature 96.3  F (35.7  C), temperature source Oral, resp. rate 18, weight 119.3 kg (263 lb 1.6 oz), SpO2 97 %.    Wt Readings from Last 5 Encounters:   18 119.3 kg (263 lb 1.6 oz)   18 117.9 kg (260 lb)   18 118.4 kg (261 lb)   18 118.2 kg (260 lb 8 oz)   18 123.8 kg (273 lb)       Temp (24hrs), Av  F (36.1  C), Min:96.3  F (35.7  C), Max:97.8  F (36.6  C)    General: alert, interactive, NAD  HEENT: AT/NC, anicteric, Moist MM  Respi/Chest: Non labored. Clear BL  CVS/Heart: S1S2 regular  Gi/Abd: Soft, non tender, non distended  MSK/Ext: Distal pulses 2+.     Neuro: AO x  4,   Skin: L LE Cellulitis: erythema, swelling, pain - improving,  Significant swelling & erythema - around tibial tuberosity.  ROM L Knee intact.    R shoulder : TTP + see ortho exam              Significant Results Obtained During Hospitalization:   All relevant data  Reviewed.      Lab Results   Component Value Date    WBC 8.2 11/24/2018    HGB 13.2 (L) 11/22/2018    HCT 39.4 (L) 11/22/2018     11/24/2018     11/20/2018    POTASSIUM 3.7 11/20/2018    CHLORIDE 107 11/20/2018    CO2 27 11/20/2018    BUN 11 11/20/2018    CR 0.69 11/24/2018    GLC 95 11/20/2018    SED 16 (H) 11/18/2018    AST 30 05/31/2018    ALT 48 05/31/2018    ALKPHOS 87 05/31/2018    BILITOTAL 1.0 05/31/2018      No results found for this or any previous visit (from the past 48 hour(s)).                  Pending Results:     Unresulted Labs Ordered in the Past 30 Days of this Admission     No orders found from 9/19/2018 to 11/19/2018.               Condition on Discharge:   Discharge condition: Stable   Discharge vitals: Blood pressure (!) 139/97, pulse 82, temperature 96.3  F (35.7  C), temperature source Oral, resp. rate 18, weight 119.3 kg (263 lb 1.6 oz), SpO2 97 %.     Code status on discharge: Full Code            Discharge Medications:     Current Discharge Medication List      START taking these medications    Details   acetaminophen (TYLENOL) 325 MG tablet Take 2 tablets (650 mg) by mouth every 4 hours as needed for mild pain  Qty: 100 tablet, Refills: 0    Associated Diagnoses: Cellulitis of left lower extremity      cephALEXin (KEFLEX) 500 MG capsule Take 1 capsule (500 mg) by mouth 3 times daily for 8 days  Qty: 24 capsule, Refills: 0    Associated Diagnoses: Cellulitis of left lower extremity      ibuprofen (ADVIL/MOTRIN) 600 MG tablet Take 1 tablet (600 mg) by mouth every 6 hours as needed for other (mild pain)  Qty: 40 tablet, Refills: 0    Associated Diagnoses: Cellulitis of left lower extremity      lisinopril  (PRINIVIL/ZESTRIL) 5 MG tablet Take 1 tablet (5 mg) by mouth daily  Qty: 30 tablet, Refills: 3    Associated Diagnoses: Hypertension goal BP (blood pressure) < 140/90      sulfamethoxazole-trimethoprim (BACTRIM DS/SEPTRA DS) 800-160 MG per tablet Take 1 tablet by mouth 2 times daily for 8 days  Qty: 16 tablet, Refills: 0    Comments: Follow up with primary care before stopping antibiotics.  Associated Diagnoses: Cellulitis of left lower extremity                    Discharge Disposition:   Discharged to home             Discharge Instructions:     Discharge Procedure Orders  Reason for your hospital stay   Order Comments: Left Lower extremity cellulitis. Treated with iv Antibiotics inpatient. Discharging on oral antibiotics to complete 2 weeks course.     Hypertension: started on Lisinopril 5 mg daily. Can be titrated up as needed. Goal BP < 130/80.     Adult [facility] Follow-up and recommended labs and tests   Order Comments: Follow up with primary care provider, Quoc Flynn MD, within 7 days for hospital follow- up.  The following labs/tests are recommended: CBC, BMP, CRP.     Follow up with Orthopedic surgery (non operative) in 2 weeks (for Left LE cellulitis including around knee. R shoulder pain)       Appointments on [place] and/or [facility] (with [facility] or [facility] provider or service). Call [phone] if you haven't heard regarding these appointments within 7 days of discharge.     Activity   Order Comments: Your activity upon discharge: activity as tolerated; keep Left Leg elevated at rest.   Order Specific Question Answer Comments   Is discharge order? Yes      Monitor and record   Order Comments: blood glucose 4 times a day, before meals and at bedtime     Diet   Order Comments: Follow this diet upon discharge: Orders Placed This Encounter     Regular Diet Adult   Order Specific Question Answer Comments   Is discharge order? Yes             Thank you for the opportunity to participate in  the care of your patient.  Please do not hesitate to contact our service with questions or concerns.    Total time spent was greater than 30 minutes; Greater than 50% of the time was in counseling and care coordination. Care coordination included discussion of medication changes, lifestyle changes and reviewing instructions to the patient.     D/W RN, ID.       Ashkan Carmen MD   Hospitalist (Internal Medicine)  Pager: [phone].     DOS : 11/24/2018

## 2018-11-26 ENCOUNTER — TELEPHONE (OUTPATIENT)
Dept: ORTHOPEDICS | Facility: CLINIC | Age: 48
End: 2018-11-26

## 2018-11-26 ENCOUNTER — PATIENT OUTREACH (OUTPATIENT)
Dept: CARE COORDINATION | Facility: CLINIC | Age: 48
End: 2018-11-26

## 2018-11-26 ENCOUNTER — TELEPHONE (OUTPATIENT)
Dept: FAMILY MEDICINE | Facility: CLINIC | Age: 48
End: 2018-11-26

## 2018-11-26 NOTE — PROGRESS NOTES
[facility]: Post-Discharge Note  SITUATION                                                      Admission:    Admission Date: 11/18/18   Reason for Admission: Cellulitis of left lower extremity  Discharge:   Discharge Date: 11/24/18  Discharge Diagnosis: Cellulitis of left lower extremity  Discharge Service: Hospitalist  Discharge Plan: PCP    BACKGROUND                                                      For details please refer to MALINI and P dated 11/18/2018  Briefly,     Aaron Mercado is a 48 year old male with ho DM, HT, Obesity with L LE cellulitis. ? L Pre patellar bursitis    ASSESSMENT      Discharge Assessment  Patient reports symptoms are: New (patient reports hives all over body. Reports doctor at hospital didn't seem too worried.  taking benadryl and antibiotics.  Swelling was back during day and went back down over night.  Today he reports minor swelling with some inflamation .But he is itch)  Does the patient have all of their medications?:  (Patient reports throat feels fine, no SOB no fever no chills no weeping.  Will call PCP)  Does patient know what their new medications are for?: Yes  Does patient have a follow-up appointment scheduled?: Yes (Patient has appt wednesday but will try to get in today)  Does patient have any other questions or concerns?: Yes (patient reports breaking out patches of hives. No fever. Patient reports breaking out on antibiotics in past. Took Benadryl last night and this morning.  has taken 2 doses so far of benadrylno scabs or pustules. No weeping of hives)    Post-op  Did the patient have surgery or a procedure: No  Fever: No  Chills: No  Eating & Drinking: eating and drinking without complaints/concerns  PO Intake: regular diet  Bowel Function: normal  Urinary Status: voiding without complaint/concerns        PLAN                                                        Future Appointments  Date Time Provider Department Center   11/28/2018 10:20 AM  Quoc Flynn MD [facility]           Kathy Olmedo

## 2018-11-26 NOTE — TELEPHONE ENCOUNTER
I called the patient today to schedule an appointment for LLE cellulitis and R shoulder rotator cuff strain with one of our non-op providers. The patient did not answer and no message was left due to the fact that he does not have a voicemail box set up.     Lisa Yanez, ATC

## 2018-11-26 NOTE — TELEPHONE ENCOUNTER
Either med could be culprit.  I think he should stop both antibiotics today and see PCP tomorrow.  He's on Day 9 of antibiotics.

## 2018-11-26 NOTE — TELEPHONE ENCOUNTER
Gave pt this message.  He will stop both antibiotics today.  Hives all over body.  Breathing fine.  Rec taking benadryl today.  Made appt with pcp for tomorrow.  CATHY Cedillo

## 2018-11-26 NOTE — TELEPHONE ENCOUNTER
Covering providers-Please review and advise.    Patient called and spoke with writer.    Per patient:  1. Hives have spread on his body  2. Small areas of hives when in hospital, but worsened last night    A. Itching   B. No pain   C. No difficulty breathing   D. In hospital, hives were attributed to antibiotic medication  3. Took Benadryl, 3 tablets (likely 75 mg total)   A. Not much improvement in hives  4. Did take both antibiotics this morning-Bactrim and Keflex    Per [facility], rash is listed as adverse effect for Bactrim.    Thank you!  BENEDICT Sullivan, BSN, RN

## 2018-11-27 ENCOUNTER — OFFICE VISIT (OUTPATIENT)
Dept: FAMILY MEDICINE | Facility: CLINIC | Age: 48
End: 2018-11-27
Payer: COMMERCIAL

## 2018-11-27 DIAGNOSIS — L03.116 CELLULITIS OF LEFT LOWER EXTREMITY: ICD-10-CM

## 2018-11-27 DIAGNOSIS — T50.905A ADVERSE EFFECT OF DRUG, INITIAL ENCOUNTER: Primary | ICD-10-CM

## 2018-11-27 DIAGNOSIS — E11.9 TYPE 2 DIABETES MELLITUS WITHOUT COMPLICATION, WITHOUT LONG-TERM CURRENT USE OF INSULIN (H): ICD-10-CM

## 2018-11-27 PROCEDURE — 99495 TRANSJ CARE MGMT MOD F2F 14D: CPT | Performed by: FAMILY MEDICINE

## 2018-11-27 ASSESSMENT — ANXIETY QUESTIONNAIRES
6. BECOMING EASILY ANNOYED OR IRRITABLE: NOT AT ALL
2. NOT BEING ABLE TO STOP OR CONTROL WORRYING: NOT AT ALL
5. BEING SO RESTLESS THAT IT IS HARD TO SIT STILL: NOT AT ALL
3. WORRYING TOO MUCH ABOUT DIFFERENT THINGS: NOT AT ALL
1. FEELING NERVOUS, ANXIOUS, OR ON EDGE: NOT AT ALL
IF YOU CHECKED OFF ANY PROBLEMS ON THIS QUESTIONNAIRE, HOW DIFFICULT HAVE THESE PROBLEMS MADE IT FOR YOU TO DO YOUR WORK, TAKE CARE OF THINGS AT HOME, OR GET ALONG WITH OTHER PEOPLE: NOT DIFFICULT AT ALL

## 2018-11-27 ASSESSMENT — PATIENT HEALTH QUESTIONNAIRE - PHQ9
SUM OF ALL RESPONSES TO PHQ QUESTIONS 1-9: 0
5. POOR APPETITE OR OVEREATING: NOT AT ALL

## 2018-11-27 NOTE — TELEPHONE ENCOUNTER
Medication was reported as not taking on 11/18/18  Patient was seen this morning.  Jagruti Yanez RN

## 2018-11-27 NOTE — PROGRESS NOTES
SUBJECTIVE:   Aaron Mercado is a 48 year old male who presents to clinic today for the following health issues:    Hospital Follow-up Visit:    Hospital/Nursing Home/IP Rehab Facility: [facility]  Date of Admission: 11/18/2018  Date of Discharge: 11/24/2018  Reason(s) for Admission: Cellulitis of left lower extremity   suspect L knee pre patellar bursitis               Problems taking medications regularly:  None       Medication changes since discharge: None       Problems adhering to non-medication therapy:  None    Summary of hospitalization:  [facility] discharge summary reviewed  Diagnostic Tests/Treatments reviewed.  Follow up needed: none  Other Healthcare Providers Involved in Patient s Care:         ortho and ID  Update since discharge: stable.      Post Discharge Medication Reconciliation: discharge medications reconciled, continue medications without change.  Plan of care communicated with patient     Coding guidelines for this visit:  Type of Medical   Decision Making Face-to-Face Visit       within 7 Days of discharge Face-to-Face Visit        within 14 days of discharge   Moderate Complexity [id number]   High Complexity [id number]          - was hospitalized with cellulitis.  Was given vancomycin and rocephin.  Was seen by ortho and ID.  Was sent home with bactrim and keflex.   - started having hives with first use of antibiotics.   Itchy skin. No tongue or lip swelling.   Mostly on torso itchy but rash all over body.   Used benadryl with some benefit.   In hospital some itching and had to use benadryl too.       Problem list and histories reviewed & adjusted, as indicated.  Additional history: as documented    Labs reviewed in EPIC    Reviewed and updated as needed this visit by clinical staff  Tobacco  Allergies  Meds  Med Hx  Surg Hx  Fam Hx  Soc Hx      Reviewed and updated as needed this visit by Provider      Social History     Social History     Marital  status: Single     Spouse name: N/A     Number of children: 1     Years of education: N/A     Occupational History      [employer]     Social History Main Topics     Smoking status: Never Smoker     Smokeless tobacco: Never Used      Comment: Non smoke home     Alcohol use Yes      Comment: 1-2 nights out during the week     Drug use: No     Sexual activity: Yes     Partners: Female     Birth control/ protection: Condom     Other Topics Concern      Service No     Blood Transfusions No     Caffeine Concern Not Asked     2 bottles of soda a day. Regular     Exercise No     Golfs and walks     Seat Belt Yes     Self-Exams Yes     Parent/Sibling W/ Cabg, Mi Or Angioplasty Before 65f 55m? No     Social History Narrative    2018:         06-    Balanced Diet - Yes    Osteoporosis Preventative measures-  Dairy servings per day: 2    Regular Exercise -  No Describe     Dental Exam up - about 1 year ago    Eye Exam - about 3 years ago    Self Testicular Exam -  YES    Do you have any concerns about STD's -  No    Abuse: Current or Past (Physical, Sexual or Emotional)- No    Do you feel safe in your environment - Yes    Guns stored in the home - No    Sunscreen used - No    Seatbelts used - Yes    Lipids - NO    Glucose -  NO    Colon Cancer Screening - Colonoscopy 8-10 years ago at [facility](date completed)    Hemoccults - NO    PSA - NO    Digital Rectal Exam - UNKNOWN    Immunizations reviewed and up to date - unsure of last Td             Allergies   Allergen Reactions     Atorvastatin      myalgia     Bactrim [Sulfamethoxazole W/Trimethoprim]      hives     Patient Active Problem List   Diagnosis     obese bmi over 35     Genital warts     Eczema     Type 2 diabetes mellitus without complication (H)     Hypertension goal BP (blood pressure) < 140/90     Hyperlipidemia with target LDL less than 100     Major depressive disorder, recurrent episode, moderate (H)     Cellulitis     Reviewed  medications, social history and  past medical and surgical history.    Review of system: for general, respiratory, CVS, GI and psychiatry negative except for noted above.     EXAM:  There were no vitals taken for this visit.  Constitutional: healthy, alert and no distress   Psychiatric: mentation appears normal and affect normal/bright  Left anterior lower leg - mild difuse erythema present, mildly raised temp of skin, no knee erythema or sweling.  Skin - various erythematous maculopapular patches all over his torso.  No tongue or lip swelling.     ASSESSMENT / PLAN:  (T56.338R) Adverse effect of drug, initial encounter  (primary encounter diagnosis)  Comment:  patient was prescribed IV antibiotics in the hospital and was sent home with Bactrim and Keflex.  Upon reviewing his chart, he has used Keflex in 2008 and I suspect primary cause of his allergic reaction is from Bactrim.  His MRSA was negative in the hospital and I think it would be reasonable to do just the monotherapy with Keflex for now as he clinically he is feeling better.  We discussed about adding clindamycin if his symptoms are getting worse.    Plan:     (L03.637) Cellulitis of left lower extremity  Comment:   Plan: Add Bactrim to his allergy list.  See above.    Follow up: If not improving or if getting worse  and In in a week.    The above note was dictated using voice recognition. Although reviewed after completion, some word and grammatical error may remain .

## 2018-11-27 NOTE — MR AVS SNAPSHOT
After Visit Summary   11/27/2018    Aaron Mercado    MRN: [id number]           Patient Information     Date Of Birth          1970        Visit Information        Provider Department      11/27/2018 8:00 AM Quoc Flynn MD [facility]         Follow-ups after your visit        Your next 10 appointments already scheduled     Nov 28, 2018 10:20 AM CST   Office Visit with Quoc Flynn MD   [facility] ([facility])    [address] [place] [id number] [phone]           Bring a current list of meds and any records pertaining to this visit. For Physicals, please bring immunization records and any forms needing to be filled out. Please arrive 10 minutes early to complete paperwork.              Who to contact     If you have questions or need follow up information about today's clinic visit or your schedule please contact [facility] directly at [phone].  Normal or non-critical lab and imaging results will be communicated to you by MyChart, letter or phone within 4 business days after the clinic has received the results. If you do not hear from us within 7 days, please contact the clinic through castacliphart or phone. If you have a critical or abnormal lab result, we will notify you by phone as soon as possible.  Submit refill requests through [employer] or call your pharmacy and they will forward the refill request to us. Please allow 3 business days for your refill to be completed.          Additional Information About Your Visit        Care EveryWhere ID     This is your Care EveryWhere ID. This could be used by other organizations to access your [place] medical records  [id number]         Blood Pressure from Last 3 Encounters:   11/24/18 (!) 139/97   11/18/18 (!) 159/101   06/13/18 114/62    Weight from Last 3 Encounters:   11/18/18 263 lb 1.6 oz (119.3 kg)   11/18/18 260 lb (117.9 kg)   06/13/18  261 lb (118.4 kg)              Today, you had the following     No orders found for display         Today's Medication Changes          These changes are accurate as of 11/27/18  8:32 AM.  If you have any questions, ask your nurse or doctor.               Stop taking these medicines if you haven't already. Please contact your care team if you have questions.     sulfamethoxazole-trimethoprim 800-160 MG per tablet   Commonly known as:  BACTRIM DS/SEPTRA DS                    Primary Care Provider Office Phone # Fax #    Quoc Trevin Flynn [phone] [address] [place] [id number]        Equal Access to Services     [facility]: Hadii roger Charles, watheo manley, nicole kumari, josephine cooper . So [facility] [phone].    ATENCIÓN: Si habla español, tiene a nails disposición servicios gratuitos de asistencia lingüística. [facility] [phone].    We comply with applicable federal civil rights laws and [place] laws. We do not discriminate on the basis of race, color, national origin, age, disability, sex, sexual orientation, or gender identity.            Thank you!     Thank you for choosing [facility]  for your care. Our goal is always to provide you with excellent care. Hearing back from our patients is one way we can continue to improve our services. Please take a few minutes to complete the written survey that you may receive in the mail after your visit with us. Thank you!             Your Updated Medication List - Protect others around you: Learn how to safely use, store and throw away your medicines at www.disposemymeds.org.          This list is accurate as of 11/27/18  8:32 AM.  Always use your most recent med list.                   Brand Name Dispense Instructions for use Diagnosis    acetaminophen 325 MG tablet    TYLENOL    100 tablet    Take 2 tablets (650 mg) by mouth every 4 hours as needed for mild  pain    Cellulitis of left lower extremity       cephALEXin 500 MG capsule    KEFLEX    24 capsule    Take 1 capsule (500 mg) by mouth 3 times daily for 8 days    Cellulitis of left lower extremity       ibuprofen 600 MG tablet    ADVIL/MOTRIN    40 tablet    Take 1 tablet (600 mg) by mouth every 6 hours as needed for other (mild pain)    Cellulitis of left lower extremity       lisinopril 5 MG tablet    PRINIVIL/ZESTRIL    30 tablet    Take 1 tablet (5 mg) by mouth daily    Hypertension goal BP (blood pressure) < 140/90

## 2018-11-27 NOTE — PROGRESS NOTES
This encounter was sent to Dr Flynn and Care Coordination pool by mistake.  Patient had an office visit this morning with PCP to address concerns     Chloe Corrales RN / Clinical Care Coordinator     [facility][note 3]   [email or web address]  Office :  [phone] / Fax :  [phone]

## 2018-11-27 NOTE — TELEPHONE ENCOUNTER
Reason for Call:  Other Refill    Detailed comments: Patient came in and stated that he is needing a refill on his Metforman and it is no longer on his medication list. He would like to use the [place] pharmacy..     Phone Number Patient can be reached at: Home number on file [phone] (home)    Best Time: Any     Can we leave a detailed message on this number? YES    Call taken on 11/27/2018 at 9:44 AM by Alexis Yusuf

## 2018-11-30 ENCOUNTER — HOME INFUSION (PRE-WILLOW HOME INFUSION) (OUTPATIENT)
Dept: PHARMACY | Facility: CLINIC | Age: 48
End: 2018-11-30

## 2018-12-04 ENCOUNTER — OFFICE VISIT (OUTPATIENT)
Dept: FAMILY MEDICINE | Facility: CLINIC | Age: 48
End: 2018-12-04
Payer: COMMERCIAL

## 2018-12-04 ENCOUNTER — HOME INFUSION (PRE-WILLOW HOME INFUSION) (OUTPATIENT)
Dept: PHARMACY | Facility: CLINIC | Age: 48
End: 2018-12-04

## 2018-12-04 VITALS
HEIGHT: 67 IN | HEART RATE: 76 BPM | WEIGHT: 260.75 LBS | DIASTOLIC BLOOD PRESSURE: 97 MMHG | RESPIRATION RATE: 16 BRPM | SYSTOLIC BLOOD PRESSURE: 140 MMHG | OXYGEN SATURATION: 95 % | BODY MASS INDEX: 40.92 KG/M2 | TEMPERATURE: 97 F

## 2018-12-04 DIAGNOSIS — I10 HYPERTENSION GOAL BP (BLOOD PRESSURE) < 140/90: ICD-10-CM

## 2018-12-04 DIAGNOSIS — L03.116 CELLULITIS OF LEFT LEG: Primary | ICD-10-CM

## 2018-12-04 LAB
ALBUMIN SERPL-MCNC: 3.5 G/DL (ref 3.4–5)
ALP SERPL-CCNC: 103 U/L (ref 40–150)
ALT SERPL W P-5'-P-CCNC: 51 U/L (ref 0–70)
ANION GAP SERPL CALCULATED.3IONS-SCNC: 10 MMOL/L (ref 3–14)
AST SERPL W P-5'-P-CCNC: 25 U/L (ref 0–45)
BILIRUB SERPL-MCNC: 0.3 MG/DL (ref 0.2–1.3)
BUN SERPL-MCNC: 20 MG/DL (ref 7–30)
CALCIUM SERPL-MCNC: 9.6 MG/DL (ref 8.5–10.1)
CHLORIDE SERPL-SCNC: 106 MMOL/L (ref 94–109)
CO2 SERPL-SCNC: 23 MMOL/L (ref 20–32)
CREAT SERPL-MCNC: 0.72 MG/DL (ref 0.66–1.25)
CRP SERPL-MCNC: <2.9 MG/L (ref 0–8)
ERYTHROCYTE [DISTWIDTH] IN BLOOD BY AUTOMATED COUNT: 12.8 % (ref 10–15)
ERYTHROCYTE [SEDIMENTATION RATE] IN BLOOD BY WESTERGREN METHOD: 18 MM/H (ref 0–15)
GFR SERPL CREATININE-BSD FRML MDRD: >90 ML/MIN/1.7M2
GLUCOSE SERPL-MCNC: 139 MG/DL (ref 70–99)
HCT VFR BLD AUTO: 43.1 % (ref 40–53)
HGB BLD-MCNC: 14.9 G/DL (ref 13.3–17.7)
MCH RBC QN AUTO: 32.3 PG (ref 26.5–33)
MCHC RBC AUTO-ENTMCNC: 34.6 G/DL (ref 31.5–36.5)
MCV RBC AUTO: 93 FL (ref 78–100)
PLATELET # BLD AUTO: 454 10E9/L (ref 150–450)
POTASSIUM SERPL-SCNC: 3.9 MMOL/L (ref 3.4–5.3)
PROT SERPL-MCNC: 8.3 G/DL (ref 6.8–8.8)
RBC # BLD AUTO: 4.62 10E12/L (ref 4.4–5.9)
SODIUM SERPL-SCNC: 139 MMOL/L (ref 133–144)
WBC # BLD AUTO: 6.6 10E9/L (ref 4–11)

## 2018-12-04 PROCEDURE — 36415 COLL VENOUS BLD VENIPUNCTURE: CPT | Performed by: FAMILY MEDICINE

## 2018-12-04 PROCEDURE — 80053 COMPREHEN METABOLIC PANEL: CPT | Performed by: FAMILY MEDICINE

## 2018-12-04 PROCEDURE — 85027 COMPLETE CBC AUTOMATED: CPT | Performed by: FAMILY MEDICINE

## 2018-12-04 PROCEDURE — 99214 OFFICE O/P EST MOD 30 MIN: CPT | Performed by: FAMILY MEDICINE

## 2018-12-04 PROCEDURE — 86140 C-REACTIVE PROTEIN: CPT | Performed by: FAMILY MEDICINE

## 2018-12-04 PROCEDURE — 85652 RBC SED RATE AUTOMATED: CPT | Performed by: FAMILY MEDICINE

## 2018-12-04 RX ORDER — CLINDAMYCIN HCL 300 MG
300 CAPSULE ORAL 3 TIMES DAILY
Qty: 30 CAPSULE | Refills: 0 | Status: SHIPPED | OUTPATIENT
Start: 2018-12-04 | End: 2018-12-14

## 2018-12-04 NOTE — PROGRESS NOTES
SUBJECTIVE:   Aaron Mercado is a 48 year old male who presents to clinic today for the following health issues:      Musculoskeletal problem/pain      Duration: f/u from 11/27/2018 and last week     Description  Location: left ankle swelling, redness    Intensity:  mild    Accompanying signs and symptoms: tingling in knee and ankle and swelling    History  Previous similar problem: YES  Previous evaluation:  none    Precipitating or alleviating factors:  Trauma or overuse: no   Aggravating factors include: standing and walking    Therapies tried and outcome: rest/inactivity and Ibuprofen helps with swelling       At work - standing and walking - left ankle swelling.   Keeps an eye on it and when he keeps it off - it feels better.     Now swelling and redness around knee again. Last day of keflex.     Allergic reaction - symptoms are all gone now. Not needing to use benadryl.     Forget to take bp med today. bp is high today.     Problem list and histories reviewed & adjusted, as indicated.  Additional history: as documented    Labs reviewed in EPIC    Reviewed and updated as needed this visit by clinical staff  Tobacco  Allergies  Meds  Med Hx  Surg Hx  Fam Hx  Soc Hx      Reviewed and updated as needed this visit by Provider           Social History     Social History     Marital status: Single     Spouse name: N/A     Number of children: 1     Years of education: N/A     Occupational History     [employer]     Social History Main Topics     Smoking status: Never Smoker     Smokeless tobacco: Never Used      Comment: Non smoke home     Alcohol use Yes      Comment: 1-2 nights out during the week     Drug use: No     Sexual activity: Yes     Partners: Female     Birth control/ protection: Condom     Other Topics Concern      Service No     Blood Transfusions No     Caffeine Concern Not Asked     2 bottles of soda a day. Regular     Exercise No     Golfs and walks     Seat Belt Yes  "    Self-Exams Yes     Parent/Sibling W/ Cabg, Mi Or Angioplasty Before 65f 55m? No     Social History Narrative    2018:         06-    Balanced Diet - Yes    Osteoporosis Preventative measures-  Dairy servings per day: 2    Regular Exercise -  No Describe     Dental Exam up - about 1 year ago    Eye Exam - about 3 years ago    Self Testicular Exam -  YES    Do you have any concerns about STD's -  No    Abuse: Current or Past (Physical, Sexual or Emotional)- No    Do you feel safe in your environment - Yes    Guns stored in the home - No    Sunscreen used - No    Seatbelts used - Yes    Lipids - NO    Glucose -  NO    Colon Cancer Screening - Colonoscopy 8-10 years ago at [facility](date completed)    Hemoccults - NO    PSA - NO    Digital Rectal Exam - UNKNOWN    Immunizations reviewed and up to date - unsure of last Td             Allergies   Allergen Reactions     Atorvastatin      myalgia     Bactrim [Sulfamethoxazole W/Trimethoprim]      hives     Patient Active Problem List   Diagnosis     obese bmi over 35     Genital warts     Eczema     Type 2 diabetes mellitus without complication (H)     Hypertension goal BP (blood pressure) < 140/90     Hyperlipidemia with target LDL less than 100     Major depressive disorder, recurrent episode, moderate (H)     Cellulitis     Reviewed medications, social history and  past medical and surgical history.    Review of system: for general, respiratory, CVS, GI and psychiatry negative except for noted above.     EXAM:  BP (!) 140/97 (BP Location: Left arm, Patient Position: Sitting, Cuff Size: Adult Large)  Pulse 76  Temp 97  F (36.1  C) (Oral)  Resp 16  Ht 5' 7\" (1.702 m)  Wt 260 lb 12 oz (118.3 kg)  SpO2 95%  BMI 40.84 kg/m2  Constitutional: healthy, alert and no distress   Psychiatric: mentation appears normal and affect normal/bright  Left leg - around knee just below patella erythema and mild swelling which was not present last time. Mildly raised temp of " skin, mild diffuse erythema of lower extremity on left side. No calf tenderness.     ASSESSMENT / PLAN:  (L03.116) Cellulitis of left leg  (primary encounter diagnosis)  Comment: was recently hospitalized.  Around knee recurrence of symptoms. Had allergic reaction to bactrim and still on keflex monotherapy. We need to add clindamycin. Side effects including diarrhea discussed. Use yogurt and probiotics. Will obtain basic labs to make sure he is not getting worse.   Plan: clindamycin (CLEOCIN) 300 MG capsule, CBC with         platelets, Comprehensive metabolic panel, ESR:         Erythrocyte sedimentation rate, CRP,         inflammation             (I10) Hypertension goal BP (blood pressure) < 140/90  Comment:  bp high. Forget to take bp pill  Plan: continue same rx for now.     Follow up:  1 week if not getting worse. If getting worse - go to ER. He agreed.     The above note was dictated using voice recognition. Although reviewed after completion, some word and grammatical error may remain .

## 2018-12-04 NOTE — MR AVS SNAPSHOT
"              After Visit Summary   12/4/2018    Aaron Mercado    MRN: [id number]           Patient Information     Date Of Birth          1970        Visit Information        Provider Department      12/4/2018 8:40 AM Quoc Flynn MD [facility]        Today's Diagnoses     Cellulitis of left leg    -  1    Hypertension goal BP (blood pressure) < 140/90           Follow-ups after your visit        Who to contact     If you have questions or need follow up information about today's clinic visit or your schedule please contact [facility] directly at [phone].  Normal or non-critical lab and imaging results will be communicated to you by MyChart, letter or phone within 4 business days after the clinic has received the results. If you do not hear from us within 7 days, please contact the clinic through MyChart or phone. If you have a critical or abnormal lab result, we will notify you by phone as soon as possible.  Submit refill requests through [employer] or call your pharmacy and they will forward the refill request to us. Please allow 3 business days for your refill to be completed.          Additional Information About Your Visit        Care EveryWhere ID     This is your Care EveryWhere ID. This could be used by other organizations to access your [place] medical records  [id number]        Your Vitals Were     Pulse Temperature Respirations Height Pulse Oximetry BMI (Body Mass Index)    76 97  F (36.1  C) (Oral) 16 5' 7\" (1.702 m) 95% 40.84 kg/m2       Blood Pressure from Last 3 Encounters:   12/04/18 (!) 140/97   11/24/18 (!) 139/97   11/18/18 (!) 159/101    Weight from Last 3 Encounters:   12/04/18 260 lb 12 oz (118.3 kg)   11/18/18 263 lb 1.6 oz (119.3 kg)   11/18/18 260 lb (117.9 kg)              We Performed the Following     CBC with platelets     Comprehensive metabolic panel     CRP, inflammation     ESR: Erythrocyte sedimentation rate          Today's " Medication Changes          These changes are accurate as of 12/4/18 11:47 AM.  If you have any questions, ask your nurse or doctor.               Start taking these medicines.        Dose/Directions    clindamycin 300 MG capsule   Commonly known as:  CLEOCIN   Used for:  Cellulitis of left leg   Started by:  Quoc Flynn MD        Dose:  300 mg   Take 1 capsule (300 mg) by mouth 3 times daily for 10 days   Quantity:  30 capsule   Refills:  0            Where to get your medicines      These medications were sent to [facility] [place] [id number]  [facility] [place] [id number]     Phone:  [phone]     clindamycin 300 MG capsule                Primary Care Provider Office Phone # Fax #    Quoc Flynn [phone]       [address] [facility] [address]        Equal Access to Services     [facility]: Hadii roger haidero Soalaina, waaxda luqadaha, qaybta kaalmada adekathyyaaneta, josephine cooper . So [facility] [phone].    ATENCIÓN: Si habla español, tiene a nails disposición servicios gratuitos de asistencia lingüística. Yoanaame al [phone].    We comply with applicable federal civil rights laws and [place] laws. We do not discriminate on the basis of race, color, national origin, age, disability, sex, sexual orientation, or gender identity.            Thank you!     Thank you for choosing [facility]  for your care. Our goal is always to provide you with excellent care. Hearing back from our patients is one way we can continue to improve our services. Please take a few minutes to complete the written survey that you may receive in the mail after your visit with us. Thank you!             Your Updated Medication List - Protect others around you: Learn how to safely use, store and throw away your medicines at www.disposemymeds.org.          This list is accurate as of 12/4/18 11:47 AM.  Always  use your most recent med list.                   Brand Name Dispense Instructions for use Diagnosis    acetaminophen 325 MG tablet    TYLENOL    100 tablet    Take 2 tablets (650 mg) by mouth every 4 hours as needed for mild pain    Cellulitis of left lower extremity       clindamycin 300 MG capsule    CLEOCIN    30 capsule    Take 1 capsule (300 mg) by mouth 3 times daily for 10 days    Cellulitis of left leg       ibuprofen 600 MG tablet    ADVIL/MOTRIN    40 tablet    Take 1 tablet (600 mg) by mouth every 6 hours as needed for other (mild pain)    Cellulitis of left lower extremity       lisinopril 5 MG tablet    PRINIVIL/ZESTRIL    30 tablet    Take 1 tablet (5 mg) by mouth daily    Hypertension goal BP (blood pressure) < 140/90       metFORMIN 500 MG tablet    GLUCOPHAGE    45 tablet    Take 1 pill every other day.    Type 2 diabetes mellitus without complication, without long-term current use of insulin (H)

## 2018-12-04 NOTE — PROGRESS NOTES
Therapy: IV ABX  Insurance: BCBS  Ded: $750  Met: $750    Co-Insurance: 80%  Max Out of Pocket: $3000  Met: $984    Please contact Intake with any questions, [phone] or In Basket pool, [facility] ([phone]).  In reference to admission 11/18/18 to check IV ABX coverge

## 2018-12-04 NOTE — LETTER
[place] [id number] [place] [id number] [phone]          December 5, 2018    Aaron Mercado                                                                                                                     [address] [place] [id number]            Dear Aaron,    Your white count, kidney and liver functions are fine. Your glucose is high.   Your inflammatory marker for immediate infection/inflammatoin is fine and it is reassuring.   Your other inflammatory marker which shows long term infection or inflammation is slightly high.   I think we are OK with current antibiotics as we discussed and we should recheck your labs and leg in a week.   Call me with questions.     Results for orders placed or performed in visit on 12/04/18   CBC with platelets   Result Value Ref Range    WBC 6.6 4.0 - 11.0 10e9/L    RBC Count 4.62 4.4 - 5.9 10e12/L    Hemoglobin 14.9 13.3 - 17.7 g/dL    Hematocrit 43.1 40.0 - 53.0 %    MCV 93 78 - 100 fl    MCH 32.3 26.5 - 33.0 pg    MCHC 34.6 31.5 - 36.5 g/dL    RDW 12.8 10.0 - 15.0 %    Platelet Count 454 (H) 150 - 450 10e9/L   Comprehensive metabolic panel   Result Value Ref Range    Sodium 139 133 - 144 mmol/L    Potassium 3.9 3.4 - 5.3 mmol/L    Chloride 106 94 - 109 mmol/L    Carbon Dioxide 23 20 - 32 mmol/L    Anion Gap 10 3 - 14 mmol/L    Glucose 139 (H) 70 - 99 mg/dL    Urea Nitrogen 20 7 - 30 mg/dL    Creatinine 0.72 0.66 - 1.25 mg/dL    GFR Estimate >90 >60 mL/min/1.7m2    GFR Estimate If Black >90 >60 mL/min/1.7m2    Calcium 9.6 8.5 - 10.1 mg/dL    Bilirubin Total 0.3 0.2 - 1.3 mg/dL    Albumin 3.5 3.4 - 5.0 g/dL    Protein Total 8.3 6.8 - 8.8 g/dL    Alkaline Phosphatase 103 40 - 150 U/L    ALT 51 0 - 70 U/L    AST 25 0 - 45 U/L   ESR: Erythrocyte sedimentation rate   Result Value Ref Range    Sed Rate 18 (H) 0 - 15 mm/h   CRP, inflammation   Result Value Ref Range    CRP Inflammation <2.9 0.0 - 8.0 mg/L       Sincerely,          Quoc Flynn MD.

## 2018-12-04 NOTE — LETTER
December 4, 2018      Aaron Mercado  [address] [place] [id number]        To Whom It May Concern:    Aaron Mercado was seen in our clinic. He may return to work without restrictions on 12/6/18. Please excuse him from work for today and tomorrow.      Sincerely,        Quoc Flynn MD, MD

## 2018-12-11 ENCOUNTER — OFFICE VISIT (OUTPATIENT)
Dept: FAMILY MEDICINE | Facility: CLINIC | Age: 48
End: 2018-12-11
Payer: COMMERCIAL

## 2018-12-11 VITALS
TEMPERATURE: 98.1 F | BODY MASS INDEX: 41.54 KG/M2 | HEART RATE: 86 BPM | WEIGHT: 265.25 LBS | DIASTOLIC BLOOD PRESSURE: 85 MMHG | OXYGEN SATURATION: 95 % | SYSTOLIC BLOOD PRESSURE: 133 MMHG | RESPIRATION RATE: 16 BRPM

## 2018-12-11 DIAGNOSIS — L03.116 CELLULITIS OF LEFT LOWER EXTREMITY: Primary | ICD-10-CM

## 2018-12-11 DIAGNOSIS — M25.511 ACUTE PAIN OF RIGHT SHOULDER: ICD-10-CM

## 2018-12-11 PROCEDURE — 99214 OFFICE O/P EST MOD 30 MIN: CPT | Performed by: FAMILY MEDICINE

## 2018-12-11 RX ORDER — MELOXICAM 15 MG/1
15 TABLET ORAL DAILY
Qty: 30 TABLET | Refills: 0 | Status: SHIPPED | OUTPATIENT
Start: 2018-12-11 | End: 2018-12-25

## 2018-12-11 NOTE — PROGRESS NOTES
SUBJECTIVE:   Aaron Mercado is a 48 year old male who presents to clinic today for the following health issues:      Chronic Pain Follow-Up     Additional: pt is here for disablitly forms to be filled out for lower left leg.     Type / Location of Pain: left lower leg, redness, warm to the touch, mild swelling  Analgesia/pain control:       Recent changes:  worse      Overall control: Tolerable with discomfort  Activity level/function:      Daily activities:  Can do most things most days, with some rest    Work:  Able to work part time without limitations  Adverse effects:  No  Adherance    Taking medication as directed?  Yes    Participating in other treatments: no -   Risk Factors:    Sleep:  Poor    Mood/anxiety:  controlled    Recent family or social stressors:  none noted    Other aggravating factors: prolonged standing and walking and sleeping   PHQ-9 SCORE 5/31/2018 5/31/2018 11/27/2018   PHQ-9 Total Score 7 11 0     OLEKSANDR-7 SCORE 10/14/2016 5/31/2018   Total Score 15 13     Encounter-Level CSA:    There are no encounter-level csa.     Patient-Level CSA:    There are no patient-level csa.       Leg is getting better. Still some warmth. Still on clindamycin. Some diarrhea.     Right shoulder - sleep is affected due to pain. Had xray which showed OA.   Right shoulder pain after a fall.     Problem list and histories reviewed & adjusted, as indicated.  Additional history: as documented    Labs reviewed in EPIC    Reviewed and updated as needed this visit by clinical staff  Tobacco  Allergies  Meds  Med Hx  Surg Hx  Fam Hx  Soc Hx      Reviewed and updated as needed this visit by Provider      Social History     Socioeconomic History     Marital status: Single     Spouse name: Not on file     Number of children: 1     Years of education: Not on file     Highest education level: Not on file   Social Needs     Financial resource strain: Not on file     Food insecurity - worry: Not on file     Food insecurity  - inability: Not on file     Transportation needs - medical: Not on file     Transportation needs - non-medical: Not on file   Occupational History     Occupation:      Employer: [employer]   Tobacco Use     Smoking status: Never Smoker     Smokeless tobacco: Never Used     Tobacco comment: Non smoke home   Substance and Sexual Activity     Alcohol use: Yes     Comment: 1-2 nights out during the week     Drug use: No     Sexual activity: Yes     Partners: Female     Birth control/protection: Condom   Other Topics Concern      Service No     Blood Transfusions No     Caffeine Concern Not Asked     Comment: 2 bottles of soda a day. Regular     Occupational Exposure Not Asked     Hobby Hazards Not Asked     Sleep Concern Not Asked     Stress Concern Not Asked     Weight Concern Not Asked     Special Diet Not Asked     Back Care Not Asked     Exercise No     Comment: Golfs and walks     Bike Helmet Not Asked     Seat Belt Yes     Self-Exams Yes     Parent/sibling w/ CABG, MI or angioplasty before 65F 55M? No   Social History Narrative    2018:         06-    Balanced Diet - Yes    Osteoporosis Preventative measures-  Dairy servings per day: 2    Regular Exercise -  No Describe     Dental Exam up - about 1 year ago    Eye Exam - about 3 years ago    Self Testicular Exam -  YES    Do you have any concerns about STD's -  No    Abuse: Current or Past (Physical, Sexual or Emotional)- No    Do you feel safe in your environment - Yes    Guns stored in the home - No    Sunscreen used - No    Seatbelts used - Yes    Lipids - NO    Glucose -  NO    Colon Cancer Screening - Colonoscopy 8-10 years ago at [facility](date completed)    Hemoccults - NO    PSA - NO    Digital Rectal Exam - UNKNOWN    Immunizations reviewed and up to date - unsure of last Td         Allergies   Allergen Reactions     Atorvastatin      myalgia     Bactrim [Sulfamethoxazole W/Trimethoprim]      hives     Patient Active  Problem List   Diagnosis     obese bmi over 35     Genital warts     Eczema     Type 2 diabetes mellitus without complication (H)     Hypertension goal BP (blood pressure) < 140/90     Hyperlipidemia with target LDL less than 100     Major depressive disorder, recurrent episode, moderate (H)     Cellulitis     Reviewed medications, social history and  past medical and surgical history.    Review of system: for general, respiratory, CVS, GI and psychiatry negative except for noted above.     EXAM:  /85 (BP Location: Left arm, Patient Position: Sitting, Cuff Size: Adult Large)   Pulse 86   Temp 98.1  F (36.7  C) (Oral)   Resp 16   Wt 120.3 kg (265 lb 4 oz)   SpO2 95%   BMI 41.54 kg/m    Constitutional: healthy, alert and no distress   Psychiatric: mentation appears normal and affect normal/bright  Left lower extremity appears much better than before but still mild erythema and raised temperature.  Just infrapatellar area with mild diffuse swelling.  Mild scaling of the skin.  Right shoulder range of motion restricted above head.  AC joint tenderness.   and strength normal.    ASSESSMENT / PLAN:  (L03.116) Cellulitis of left lower extremity  (primary encounter diagnosis)  Comment: He is seeing improvement in his symptoms but I do suspect that he could have some pretibial cellulitis versus bursitis.  He is already on appropriate antibiotic and he should continue the same.  I recommend he should follow-up with sports medicine for further evaluation.  If his symptoms of infection are getting worse it may be reasonable to seek an input from infectious disease.  Plan: ORTHO  REFERRAL, meloxicam (MOBIC) 15         MG tablet            (M25.511) Acute pain of right shoulder  Comment: See above.  Reasonable to see sports medicine for further evaluation as mentioned above.  For his shoulder it shows some AC arthritis and I suspect there is some tendinosis.  NSAIDs as prescribed.  We discussed side  effects and how to use and do not use any other NSAIDs along with it.  Okay to use Tylenol.  Plan: ORTHO  REFERRAL, meloxicam (MOBIC) 15         MG tablet            Follow up: Pending sports medicine evaluation.    The above note was dictated using voice recognition. Although reviewed after completion, some word and grammatical error may remain .

## 2018-12-14 ENCOUNTER — OFFICE VISIT (OUTPATIENT)
Dept: ORTHOPEDICS | Facility: CLINIC | Age: 48
End: 2018-12-14
Payer: COMMERCIAL

## 2018-12-14 VITALS
BODY MASS INDEX: 41.59 KG/M2 | WEIGHT: 265 LBS | HEIGHT: 67 IN | DIASTOLIC BLOOD PRESSURE: 88 MMHG | SYSTOLIC BLOOD PRESSURE: 128 MMHG

## 2018-12-14 DIAGNOSIS — L03.116 CELLULITIS OF LEFT LEG: ICD-10-CM

## 2018-12-14 DIAGNOSIS — M75.101 ROTATOR CUFF SYNDROME OF RIGHT SHOULDER: ICD-10-CM

## 2018-12-14 DIAGNOSIS — M19.011 DJD OF RIGHT AC (ACROMIOCLAVICULAR) JOINT: Primary | ICD-10-CM

## 2018-12-14 PROCEDURE — 99244 OFF/OP CNSLTJ NEW/EST MOD 40: CPT | Performed by: FAMILY MEDICINE

## 2018-12-14 RX ORDER — TRAMADOL HYDROCHLORIDE 50 MG/1
50 TABLET ORAL
Qty: 10 TABLET | Refills: 0 | Status: SHIPPED | OUTPATIENT
Start: 2018-12-14 | End: 2019-01-29

## 2018-12-14 RX ORDER — CLINDAMYCIN HCL 300 MG
300 CAPSULE ORAL 3 TIMES DAILY
Qty: 21 CAPSULE | Refills: 0 | Status: SHIPPED | OUTPATIENT
Start: 2018-12-14 | End: 2018-12-21

## 2018-12-14 ASSESSMENT — ENCOUNTER SYMPTOMS
WEAKNESS: 0
FEVER: 0
CHILLS: 0
NECK PAIN: 0

## 2018-12-14 ASSESSMENT — MIFFLIN-ST. JEOR: SCORE: 2040.66

## 2018-12-14 NOTE — PROGRESS NOTES
[facility]   Clinic Visit s Dec 14, 2018    PCP: Quoc Flynn      Aaron is a 48 year old male who is seen in consultation at the request of Dr. Flynn for   Chief Complaint   Patient presents with     Left Leg - Pain     Right Shoulder - Pain       Injury: tripped and landed on his knee and hurt his shoulder as well. Reports insidious onset without acute precipitating event as to injection, he was in the hospital with celulitis infection.      Right hand dominant    Location of Pain: left knee anterior , nonradiating   Duration of Pain: 3 week(s)  Rating of Pain at worst: 6/10  Rating of Pain Currently: 3/10  Pain is better with: activity avoidance   Pain is worse with: nothing specific   Treatment so far consists of: clindamycin after rash with bactrim, currently on last day, compression sock  Associated symptoms: warmth  Recent imaging completed: X-rays completed 11/22/18.  Prior History of related problems: none       Location of Pain: right shoulder lateral , nonradiating   Duration of Pain: 3 week(s)  Rating of Pain at worst: 6/10  Rating of Pain Currently: 4/10  Pain is better with: activity avoidance   Pain is worse with: sleeping   Treatment so far consists of: pain medication: Mobic, did some PHYSICAL THERAPY while in the hospital for cellulitis. Some trouble with work, mostly trouble with sleep.     Associated symptoms: warmth  Recent imaging completed: X-rays completed 11/22/18.  Prior History of related problems: none      Social History: is employed as a/an retail      Past Medical History:   Diagnosis Date     Diabetes (H)      Hypertension      Obesity        Patient Active Problem List    Diagnosis Date Noted     Cellulitis 11/18/2018     Priority: Medium     Major depressive disorder, recurrent episode, moderate (H) 10/14/2016     Priority: Medium     Type 2 diabetes mellitus without complication (H) 01/31/2014     Priority: Medium     Hypertension goal  BP (blood pressure) < 140/90 01/31/2014     Priority: Medium     Hyperlipidemia with target LDL less than 100 01/31/2014     Priority: Medium     Diagnosis updated by automated process. Provider to review and confirm.       Genital warts 06/11/2008     Priority: Medium     Eczema 06/11/2008     Priority: Medium     obese bmi over 35      Priority: Medium       Family History   Problem Relation Age of Onset     Diabetes Mother      Family History Negative Father      Family History Negative Sister      Family History Negative Sister      Family History Negative Brother      Family History Negative Brother      Family History Negative Brother      Family History Negative Sister      Family History Negative Son        Social History     Socioeconomic History     Marital status: Single     Spouse name: Not on file     Number of children: 1     Years of education: Not on file     Highest education level: Not on file   Social Needs     Financial resource strain: Not on file     Food insecurity - worry: Not on file     Food insecurity - inability: Not on file     Transportation needs - medical: Not on file     Transportation needs - non-medical: Not on file   Occupational History     Occupation: Night [employer]     Employer: [employer]   Tobacco Use     Smoking status: Never Smoker     Smokeless tobacco: Never Used     Tobacco comment: Non smoke home   Substance and Sexual Activity     Alcohol use: Yes     Comment: 1-2 nights out during the week     Drug use: No       Past Surgical History:   Procedure Laterality Date     C ORAL SURGERY PROCEDURE  2000    [place] teeth extraction     LASIK  2003           Review of Systems   Constitutional: Negative for chills, fever and malaise/fatigue.   Musculoskeletal: Positive for joint pain. Negative for neck pain.   Skin: Positive for rash.   Neurological: Negative for weakness.   All other systems reviewed and are negative.        Physical Exam   Musculoskeletal:        Right knee:  "He exhibits no effusion.     /88   Ht 1.702 m (5' 7\")   Wt 120.2 kg (265 lb)   BMI 41.50 kg/m    Constitutional:well-developed, well-nourished, and in no distress.   Cardiovascular: Intact distal pulses.    Neurological: alert. Gait Normal:   Gait, station, stance, and balance appear normal for age  Skin: Skin is warm and dry.   Psychiatric: Mood and affect normal.   Respiratory: unlabored, speaks in full sentences  Lymph: no LAD, no lymphangitis            Right Knee Exam     Tenderness   The patient is experiencing tenderness in the patella.    Range of Motion   Extension: normal   Flexion: normal     Tests   Fatmata:  Medial - negative Lateral - negative  Varus: negative Valgus: negative  Drawer:  Anterior - negative    Posterior - negative  Patellar apprehension: negative    Other   Erythema: present  Scars: absent  Sensation: normal  Pulse: present  Swelling: mild  Effusion: no effusion present    Comments:  Mild swelling, mild redness, trace warmth over supra patellar region consistent with prepatellar bursitis.  No other cellulitic evidence remains.      Right Shoulder Exam     Tenderness   The patient is experiencing tenderness in the acromioclavicular joint.    Range of Motion   Active abduction: abnormal   Passive abduction: abnormal   Extension: normal   External rotation: normal   Forward flexion: abnormal   Internal rotation 0 degrees: Sacrum   Internal rotation 90 degrees: 90     Muscle Strength   Abduction: 5/5   Internal rotation: 5/5   External rotation: 5/5   Supraspinatus: 5/5   Subscapularis: 5/5   Biceps: 5/5     Tests   Apprehension: negative  Hassan test: positive  Cross arm: negative  Impingement: positive  Drop arm: positive    Other   Erythema: absent  Scars: absent  Sensation: normal  Pulse: present    Comments:  Marked crepitus with range of motion over the AC joint          Recent Results (from the past 744 hour(s))   XR Knee Left 1/2 Views    Narrative    LEFT KNEE ONE TO TWO " VIEWS 11/18/2018 1:52 PM     HISTORY: Pain. Redness. Swelling after fall.     COMPARISON: None.      Impression    IMPRESSION: Moderate narrowing medial joint space. Mild degenerative  marginal bony spurring at the patellofemoral joint. No acute bony  abnormality. Anterior subcutaneous edema.    JOHAN MADRID MD   US Lower Extremity Venous Duplex Left    Narrative    VENOUS DOPPLER LEFT LOWER EXTREMITY 11/18/2018 2:05 PM    HISTORY: Left lower extremity pain, swelling and redness.    COMPARISON: None.    FINDINGS: Color-flow imaging and Doppler waveform spectral analysis  were utilized. There is normal compressibility and spontaneous flow  throughout the left common femoral, superficial femoral, popliteal and  visualized calf veins.      Impression    IMPRESSION: No evidence for deep venous thrombosis.    JOHAN MADRID MD   XR Shoulder Right G/E 3 Views    Narrative    3 views right shoulder radiographs 11/22/2018 11:17 AM    History: R shoulder pain after fall;     Comparison: None available.    Findings:    AP internal, external rotation and transscapular Y  views of the right  shoulder were obtained.     No acute osseous abnormality.  Glenohumeral and acromioclavicular  joints are congruent.    Moderate to severe degenerative changes of the acromioclavicular  joint. Mild degenerative change of the glenohumeral joint. Small  mineralization superior to the coracoid, may be related to prior  trauma or hydroxyapatite deposition. No associated coracoclavicular  distance widening.    Soft tissue is unremarkable.  The visualized lung is clear.      Impression    Impression:  1. No acute osseous abnormality.  2. Moderate to severe acromioclavicular joint degenerative change.    JOE STEPHENSON     ASSESSMENT/PLAN    ICD-10-CM    1. DJD of right AC (acromioclavicular) joint M19.011 traMADol (ULTRAM) 50 MG tablet     [facility]   2. Cellulitis of left leg L03.116 clindamycin (CLEOCIN) 300  MG capsule   3. Rotator cuff syndrome of right shoulder M75.101 traMADol (ULTRAM) 50 MG tablet     [facility]     Possible mild persistent cellulitis of the left leg versus prepatellar bursitis of an inflammatory not infectious nature.  Encouraged him to initiate cold packs 15 minutes up to 3 times a day and will cover with another week of clindamycin.  If symptoms persist, surgical debridement may be necessary    Right shoulder demonstrates marked crepitus and slight pain about the AC joint but primarily symptoms of rotator cuff syndrome.  This pain is his greatest concern today given difficulty with sleep especially but also some mild pain with work.  He did some mild physical therapy in the hospital but was unable to do much due to pain.  Offered return to physical therapy and discouraged injection today due to his diabetes but also possible persistent cellulitis and not wanting to risk causing either of those conditions to flare.  Okay for tramadol for severe pain affecting sleep.  Start physical therapy.  Recheck in 2 weeks.

## 2018-12-14 NOTE — LETTER
12/14/2018         RE: Aaron Mercado  [address] [address]        Dear Colleague,    Thank you for referring your patient, Aaron Mercado, to the [facility]. Please see a copy of my visit note below.        HPI     [facility]   Clinic Visit s Dec 14, 2018    PCP: Quoc Flynn      Aaron is a 48 year old male who is seen in consultation at the request of Dr. Flynn for   Chief Complaint   Patient presents with     Left Leg - Pain     Right Shoulder - Pain       Injury: tripped and landed on his knee and hurt his shoulder as well. Reports insidious onset without acute precipitating event as to injection, he was in the hospital with celulitis infection.      Right hand dominant    Location of Pain: left knee anterior , nonradiating   Duration of Pain: 3 week(s)  Rating of Pain at worst: 6/10  Rating of Pain Currently: 3/10  Pain is better with: activity avoidance   Pain is worse with: nothing specific   Treatment so far consists of: clindamycin after rash with bactrim, currently on last day, compression sock  Associated symptoms: warmth  Recent imaging completed: X-rays completed 11/22/18.  Prior History of related problems: none       Location of Pain: right shoulder lateral , nonradiating   Duration of Pain: 3 week(s)  Rating of Pain at worst: 6/10  Rating of Pain Currently: 4/10  Pain is better with: activity avoidance   Pain is worse with: sleeping   Treatment so far consists of: pain medication: Mobic, did some PHYSICAL THERAPY while in the hospital for cellulitis. Some trouble with work, mostly trouble with sleep.     Associated symptoms: warmth  Recent imaging completed: X-rays completed 11/22/18.  Prior History of related problems: none      Social History: is employed as a/an retail      Past Medical History:   Diagnosis Date     Diabetes (H)      Hypertension      Obesity        Patient Active Problem List     Diagnosis Date Noted     Cellulitis 11/18/2018     Priority: Medium     Major depressive disorder, recurrent episode, moderate (H) 10/14/2016     Priority: Medium     Type 2 diabetes mellitus without complication (H) 01/31/2014     Priority: Medium     Hypertension goal BP (blood pressure) < 140/90 01/31/2014     Priority: Medium     Hyperlipidemia with target LDL less than 100 01/31/2014     Priority: Medium     Diagnosis updated by automated process. Provider to review and confirm.       Genital warts 06/11/2008     Priority: Medium     Eczema 06/11/2008     Priority: Medium     obese bmi over 35      Priority: Medium       Family History   Problem Relation Age of Onset     Diabetes Mother      Family History Negative Father      Family History Negative Sister      Family History Negative Sister      Family History Negative Brother      Family History Negative Brother      Family History Negative Brother      Family History Negative Sister      Family History Negative Son        Social History     Socioeconomic History     Marital status: Single     Spouse name: Not on file     Number of children: 1     Years of education: Not on file     Highest education level: Not on file   Social Needs     Financial resource strain: Not on file     Food insecurity - worry: Not on file     Food insecurity - inability: Not on file     Transportation needs - medical: Not on file     Transportation needs - non-medical: Not on file   Occupational History     Occupation: Night [employer]     Employer: [employer]   Tobacco Use     Smoking status: Never Smoker     Smokeless tobacco: Never Used     Tobacco comment: Non smoke home   Substance and Sexual Activity     Alcohol use: Yes     Comment: 1-2 nights out during the week     Drug use: No       Past Surgical History:   Procedure Laterality Date     C ORAL SURGERY PROCEDURE  2000    [place] teeth extraction     LASIK  2003           Review of Systems   Constitutional: Negative for  "chills, fever and malaise/fatigue.   Musculoskeletal: Positive for joint pain. Negative for neck pain.   Skin: Positive for rash.   Neurological: Negative for weakness.   All other systems reviewed and are negative.        Physical Exam   Musculoskeletal:        Right knee: He exhibits no effusion.     /88   Ht 1.702 m (5' 7\")   Wt 120.2 kg (265 lb)   BMI 41.50 kg/m     Constitutional:well-developed, well-nourished, and in no distress.   Cardiovascular: Intact distal pulses.    Neurological: alert. Gait Normal:   Gait, station, stance, and balance appear normal for age  Skin: Skin is warm and dry.   Psychiatric: Mood and affect normal.   Respiratory: unlabored, speaks in full sentences  Lymph: no LAD, no lymphangitis            Right Knee Exam     Tenderness   The patient is experiencing tenderness in the patella.    Range of Motion   Extension: normal   Flexion: normal     Tests   Fatmata:  Medial - negative Lateral - negative  Varus: negative Valgus: negative  Drawer:  Anterior - negative    Posterior - negative  Patellar apprehension: negative    Other   Erythema: present  Scars: absent  Sensation: normal  Pulse: present  Swelling: mild  Effusion: no effusion present    Comments:  Mild swelling, mild redness, trace warmth over supra patellar region consistent with prepatellar bursitis.  No other cellulitic evidence remains.      Right Shoulder Exam     Tenderness   The patient is experiencing tenderness in the acromioclavicular joint.    Range of Motion   Active abduction: abnormal   Passive abduction: abnormal   Extension: normal   External rotation: normal   Forward flexion: abnormal   Internal rotation 0 degrees: Sacrum   Internal rotation 90 degrees: 90     Muscle Strength   Abduction: 5/5   Internal rotation: 5/5   External rotation: 5/5   Supraspinatus: 5/5   Subscapularis: 5/5   Biceps: 5/5     Tests   Apprehension: negative  Hassan test: positive  Cross arm: negative  Impingement: " positive  Drop arm: positive    Other   Erythema: absent  Scars: absent  Sensation: normal  Pulse: present    Comments:  Marked crepitus with range of motion over the AC joint          Recent Results (from the past 744 hour(s))   XR Knee Left 1/2 Views    Narrative    LEFT KNEE ONE TO TWO VIEWS 11/18/2018 1:52 PM     HISTORY: Pain. Redness. Swelling after fall.     COMPARISON: None.      Impression    IMPRESSION: Moderate narrowing medial joint space. Mild degenerative  marginal bony spurring at the patellofemoral joint. No acute bony  abnormality. Anterior subcutaneous edema.    JOHAN MADRID MD   US Lower Extremity Venous Duplex Left    Narrative    VENOUS DOPPLER LEFT LOWER EXTREMITY 11/18/2018 2:05 PM    HISTORY: Left lower extremity pain, swelling and redness.    COMPARISON: None.    FINDINGS: Color-flow imaging and Doppler waveform spectral analysis  were utilized. There is normal compressibility and spontaneous flow  throughout the left common femoral, superficial femoral, popliteal and  visualized calf veins.      Impression    IMPRESSION: No evidence for deep venous thrombosis.    JOHAN MADRID MD   XR Shoulder Right G/E 3 Views    Narrative    3 views right shoulder radiographs 11/22/2018 11:17 AM    History: R shoulder pain after fall;     Comparison: None available.    Findings:    AP internal, external rotation and transscapular Y  views of the right  shoulder were obtained.     No acute osseous abnormality.  Glenohumeral and acromioclavicular  joints are congruent.    Moderate to severe degenerative changes of the acromioclavicular  joint. Mild degenerative change of the glenohumeral joint. Small  mineralization superior to the coracoid, may be related to prior  trauma or hydroxyapatite deposition. No associated coracoclavicular  distance widening.    Soft tissue is unremarkable.  The visualized lung is clear.      Impression    Impression:  1. No acute osseous abnormality.  2. Moderate to severe  acromioclavicular joint degenerative change.    JOE STEPHENSON     ASSESSMENT/PLAN    ICD-10-CM    1. DJD of right AC (acromioclavicular) joint M19.011 traMADol (ULTRAM) 50 MG tablet     [facility]   2. Cellulitis of left leg L03.116 clindamycin (CLEOCIN) 300 MG capsule   3. Rotator cuff syndrome of right shoulder M75.101 traMADol (ULTRAM) 50 MG tablet     [facility]     Possible mild persistent cellulitis of the left leg versus prepatellar bursitis of an inflammatory not infectious nature.  Encouraged him to initiate cold packs 15 minutes up to 3 times a day and will cover with another week of clindamycin.  If symptoms persist, surgical debridement may be necessary    Right shoulder demonstrates marked crepitus and slight pain about the AC joint but primarily symptoms of rotator cuff syndrome.  This pain is his greatest concern today given difficulty with sleep especially but also some mild pain with work.  He did some mild physical therapy in the hospital but was unable to do much due to pain.  Offered return to physical therapy and discouraged injection today due to his diabetes but also possible persistent cellulitis and not wanting to risk causing either of those conditions to flare.  Okay for tramadol for severe pain affecting sleep.  Start physical therapy.  Recheck in 2 weeks.Again, thank you for allowing me to participate in the care of your patient.        Sincerely,        Sony Lynch MD

## 2018-12-25 ENCOUNTER — OFFICE VISIT (OUTPATIENT)
Dept: URGENT CARE | Facility: URGENT CARE | Age: 48
End: 2018-12-25
Payer: COMMERCIAL

## 2018-12-25 VITALS
WEIGHT: 269.9 LBS | SYSTOLIC BLOOD PRESSURE: 136 MMHG | OXYGEN SATURATION: 97 % | TEMPERATURE: 98.5 F | DIASTOLIC BLOOD PRESSURE: 90 MMHG | BODY MASS INDEX: 42.27 KG/M2 | HEART RATE: 71 BPM

## 2018-12-25 DIAGNOSIS — J02.9 THROAT SORENESS: Primary | ICD-10-CM

## 2018-12-25 DIAGNOSIS — J02.0 STREPTOCOCCAL PHARYNGITIS: ICD-10-CM

## 2018-12-25 LAB
DEPRECATED S PYO AG THROAT QL EIA: ABNORMAL
SPECIMEN SOURCE: ABNORMAL

## 2018-12-25 PROCEDURE — 99213 OFFICE O/P EST LOW 20 MIN: CPT | Performed by: FAMILY MEDICINE

## 2018-12-25 PROCEDURE — 87880 STREP A ASSAY W/OPTIC: CPT | Performed by: FAMILY MEDICINE

## 2018-12-25 RX ORDER — PENICILLIN V POTASSIUM 500 MG/1
500 TABLET, FILM COATED ORAL 2 TIMES DAILY
Qty: 20 TABLET | Refills: 0 | Status: SHIPPED | OUTPATIENT
Start: 2018-12-25 | End: 2019-01-04

## 2018-12-25 NOTE — PROGRESS NOTES
SUBJECTIVE:   Aaron Mercado is a 48 year old male with h/o hypertension , DM presenting with a chief complaint of throat symptoms   He describes it as something stuck in throat , he denies any allergy symptoms , has been feeling fine otherwise   Denies any sob or fever symptoms   He recently got treated with clindamycin for left leg cellulitis . He is an established patient of [facility].  Onset of symptoms was 1 day(s) ago.  Course of illness is same.    Severity moderate  Current and Associated symptoms: throat symptoms   Treatment measures tried include None tried.  Predisposing factors include None.      Past Medical History:   Diagnosis Date     Diabetes (H)      Hypertension      Obesity      Current Outpatient Medications   Medication Sig Dispense Refill     acetaminophen (TYLENOL) 325 MG tablet Take 2 tablets (650 mg) by mouth every 4 hours as needed for mild pain 100 tablet 0     lisinopril (PRINIVIL/ZESTRIL) 5 MG tablet Take 1 tablet (5 mg) by mouth daily 30 tablet 3     metFORMIN (GLUCOPHAGE) 500 MG tablet Take 1 pill every other day. 45 tablet 1     penicillin V (VEETID) 500 MG tablet Take 1 tablet (500 mg) by mouth 2 times daily for 10 days 20 tablet 0     traMADol (ULTRAM) 50 MG tablet Take 1 tablet (50 mg) by mouth nightly as needed for moderate to severe pain (Patient not taking: Reported on 12/25/2018) 10 tablet 0     Social History     Tobacco Use     Smoking status: Never Smoker     Smokeless tobacco: Never Used     Tobacco comment: Non smoke home   Substance Use Topics     Alcohol use: Yes     Comment: 1-2 nights out during the week     Family History   Problem Relation Age of Onset     Diabetes Mother      Family History Negative Father      Family History Negative Sister      Family History Negative Sister      Family History Negative Brother      Family History Negative Brother      Family History Negative Brother      Family History Negative Sister      Family History Negative Son           ROS:    10 point ROS of systems including Constitutional, Eyes, Respiratory, Cardiovascular, Gastroenterology, Genitourinary, Integumentary, Muscularskeletal, Psychiatric were all negative except for pertinent positives noted in my HPI         OBJECTIVE:  /90 (BP Location: Right arm, Patient Position: Chair, Cuff Size: Adult Large)   Pulse 71   Temp 98.5  F (36.9  C) (Oral)   Wt 122.4 kg (269 lb 14.4 oz)   SpO2 97%   BMI 42.27 kg/m    GENERAL APPEARANCE: healthy, alert and no distress  EYES: EOMI,  PERRL, conjunctiva clear  HENT: ear canals and TM's normal.  Nose and mouth without ulcers, throat mild erythema noted  NECK: supple, nontender, no lymphadenopathy  RESP: lungs clear to auscultation - no rales, rhonchi or wheezes  CV: regular rates and rhythm, normal S1 S2, no murmur noted  ABDOMEN:  soft, nontender, no HSM or masses and bowel sounds normal  SKIN: no suspicious lesions or rashes  Physical Exam            ASSESSMENT:  Aaron was seen today for urgent care and throat problem.    Diagnoses and all orders for this visit:    Throat soreness  -     Strep, Rapid Screen    Streptococcal pharyngitis  -     penicillin V (VEETID) 500 MG tablet; Take 1 tablet (500 mg) by mouth 2 times daily for 10 days          PLAN:  Tylenol, Ibuprofen, Fluids, Saline gargles and Vaporizer  Penicillin for strep  Follow up if  symptoms fail to improve or worsens   Pt understood and agreed with plan     Brinda Ugarte MD on 12/25/2018 at 10:06 AM    See orders in Epic

## 2019-01-29 ENCOUNTER — OFFICE VISIT (OUTPATIENT)
Dept: FAMILY MEDICINE | Facility: CLINIC | Age: 49
End: 2019-01-29
Payer: COMMERCIAL

## 2019-01-29 VITALS
WEIGHT: 263.25 LBS | HEART RATE: 78 BPM | TEMPERATURE: 98.5 F | SYSTOLIC BLOOD PRESSURE: 155 MMHG | BODY MASS INDEX: 41.32 KG/M2 | HEIGHT: 67 IN | DIASTOLIC BLOOD PRESSURE: 102 MMHG | OXYGEN SATURATION: 96 % | RESPIRATION RATE: 16 BRPM

## 2019-01-29 DIAGNOSIS — R05.9 COUGH: Primary | ICD-10-CM

## 2019-01-29 DIAGNOSIS — M54.50 ACUTE BILATERAL LOW BACK PAIN WITHOUT SCIATICA: ICD-10-CM

## 2019-01-29 DIAGNOSIS — M19.011 DJD OF RIGHT AC (ACROMIOCLAVICULAR) JOINT: ICD-10-CM

## 2019-01-29 DIAGNOSIS — I10 HYPERTENSION GOAL BP (BLOOD PRESSURE) < 140/90: ICD-10-CM

## 2019-01-29 DIAGNOSIS — M75.101 ROTATOR CUFF SYNDROME OF RIGHT SHOULDER: ICD-10-CM

## 2019-01-29 PROCEDURE — 99214 OFFICE O/P EST MOD 30 MIN: CPT | Mod: 25 | Performed by: FAMILY MEDICINE

## 2019-01-29 PROCEDURE — 96372 THER/PROPH/DIAG INJ SC/IM: CPT | Performed by: FAMILY MEDICINE

## 2019-01-29 RX ORDER — KETOROLAC TROMETHAMINE 30 MG/ML
60 INJECTION, SOLUTION INTRAMUSCULAR; INTRAVENOUS ONCE
Qty: 2 ML | Refills: 0 | OUTPATIENT
Start: 2019-01-29 | End: 2019-01-29

## 2019-01-29 RX ORDER — CODEINE PHOSPHATE AND GUAIFENESIN 10; 100 MG/5ML; MG/5ML
1 SOLUTION ORAL EVERY 6 HOURS PRN
Qty: 120 ML | Refills: 0 | Status: SHIPPED | OUTPATIENT
Start: 2019-01-29 | End: 2020-05-26

## 2019-01-29 RX ORDER — TRAMADOL HYDROCHLORIDE 50 MG/1
50 TABLET ORAL
Qty: 10 TABLET | Refills: 0 | Status: ON HOLD | OUTPATIENT
Start: 2019-01-29 | End: 2020-06-07

## 2019-01-29 RX ORDER — KETOROLAC TROMETHAMINE 30 MG/ML
60 INJECTION, SOLUTION INTRAMUSCULAR; INTRAVENOUS ONCE
Status: COMPLETED | OUTPATIENT
Start: 2019-01-29 | End: 2019-01-29

## 2019-01-29 RX ORDER — CYCLOBENZAPRINE HCL 5 MG
5-10 TABLET ORAL 3 TIMES DAILY PRN
Qty: 20 TABLET | Refills: 0 | Status: ON HOLD | OUTPATIENT
Start: 2019-01-29 | End: 2020-06-07

## 2019-01-29 RX ADMIN — KETOROLAC TROMETHAMINE 60 MG: 30 INJECTION, SOLUTION INTRAMUSCULAR; INTRAVENOUS at 09:11

## 2019-01-29 ASSESSMENT — MIFFLIN-ST. JEOR: SCORE: 2032.72

## 2019-01-29 NOTE — NURSING NOTE
Prior to injection, verified patient identity using patient's name and date of birth.  Due to injection administration, patient instructed to remain in clinic for 15 minutes  afterwards, and to report any adverse reaction to me immediately.    ketorlac tromethamine 60mg/2mL    Drug Amount Wasted:  None.  Vial/Syringe: Single dose vial  Expiration Date:  08/01/2019    Cintia Hurst CMA

## 2019-01-29 NOTE — PROGRESS NOTES
SUBJECTIVE:   Aaron Mercado is a 48 year old male who presents to clinic today for the following health issues:    Acute Illness   Acute illness concerns: cold sx  Onset: 2 weeks    Fever: no    Chills/Sweats: YES- chills    Headache (location?): no    Sinus Pressure:YES- post-nasal drainage    Conjunctivitis:  YES: bilateral and crusty     Ear Pain: no    Rhinorrhea: YES-over a month    Congestion: YES    Sore Throat: YES     Cough: YES-productive of green sputum    Wheeze: no    Decreased Appetite: YES    Nausea: YES    Vomiting: no    Diarrhea:  YES-couple days ago    Dysuria/Freq.: no    Fatigue/Achiness: YES- fatigue     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     Since strep - having cold symptoms since then.   Had a flu shot this year.   Voice is getting horse.   Coughing with green mucus. More of sinus congestion and infection.     Low back - spasms from coughing. Got toradol shot in the past and it was helpful.     Right shoulder still painful.  Been to sports medicine doctor but he really was not feeling good and did not do a shoulder injection for him.  He got some tramadol and it was helpful but not significantly helpful.    Problem list and histories reviewed & adjusted, as indicated.  Additional history: as documented    Labs reviewed in EPIC    Reviewed and updated as needed this visit by clinical staff    Reviewed and updated as needed this visit by Provider      Social History     Socioeconomic History     Marital status: Single     Spouse name: Not on file     Number of children: 1     Years of education: Not on file     Highest education level: Not on file   Social Needs     Financial resource strain: Not on file     Food insecurity - worry: Not on file     Food insecurity - inability: Not on file     Transportation needs - medical: Not on file     Transportation needs  - non-medical: Not on file   Occupational History     Occupation:      Employer: [employer]   Tobacco Use     Smoking status: Never Smoker     Smokeless tobacco: Never Used     Tobacco comment: Non smoke home   Substance and Sexual Activity     Alcohol use: Yes     Comment: 1-2 nights out during the week     Drug use: No     Sexual activity: Yes     Partners: Female     Birth control/protection: Condom   Other Topics Concern      Service No     Blood Transfusions No     Caffeine Concern Not Asked     Comment: 2 bottles of soda a day. Regular     Occupational Exposure Not Asked     Hobby Hazards Not Asked     Sleep Concern Not Asked     Stress Concern Not Asked     Weight Concern Not Asked     Special Diet Not Asked     Back Care Not Asked     Exercise No     Comment: Golfs and walks     Bike Helmet Not Asked     Seat Belt Yes     Self-Exams Yes     Parent/sibling w/ CABG, MI or angioplasty before 65F 55M? No   Social History Narrative    2018:         06-    Balanced Diet - Yes    Osteoporosis Preventative measures-  Dairy servings per day: 2    Regular Exercise -  No Describe     Dental Exam up - about 1 year ago    Eye Exam - about 3 years ago    Self Testicular Exam -  YES    Do you have any concerns about STD's -  No    Abuse: Current or Past (Physical, Sexual or Emotional)- No    Do you feel safe in your environment - Yes    Guns stored in the home - No    Sunscreen used - No    Seatbelts used - Yes    Lipids - NO    Glucose -  NO    Colon Cancer Screening - Colonoscopy 8-10 years ago at [facility](date completed)    Hemoccults - NO    PSA - NO    Digital Rectal Exam - UNKNOWN    Immunizations reviewed and up to date - unsure of last Td         Allergies   Allergen Reactions     Atorvastatin      myalgia     Bactrim [Sulfamethoxazole W/Trimethoprim]      hives     Patient Active Problem List   Diagnosis     obese bmi over 35     Genital warts     Eczema     Type 2 diabetes  "mellitus without complication (H)     Hypertension goal BP (blood pressure) < 140/90     Hyperlipidemia with target LDL less than 100     Major depressive disorder, recurrent episode, moderate (H)     Cellulitis     Acute bilateral low back pain without sciatica     Reviewed medications, social history and  past medical and surgical history.    Review of system: for general, respiratory, CVS, GI and psychiatry negative except for noted above.     EXAM:  BP (!) 155/102 (BP Location: Left arm, Patient Position: Sitting, Cuff Size: Adult Large)   Pulse 78   Temp 98.5  F (36.9  C) (Oral)   Resp 16   Ht 1.702 m (5' 7\")   Wt 119.4 kg (263 lb 4 oz)   SpO2 96%   BMI 41.23 kg/m    Constitutional: healthy, alert and no distress   Psychiatric: mentation appears normal and affect normal/bright  Cardiovascular: RRR. No murmurs,  Respiratory: negative, Lungs clear. No crackles or wheezing. No tachypnea.  coughing  ENT: Both TM exam normal, no neck nodes or sinus tenderness, mild nasal turbinate hypertrophy, throat clear  JOINT/EXTREMITIES: Right shoulder range of motion is restricted above head.  Back lumbar spine and left paraspinal area diffuse mild tenderness.    ASSESSMENT / PLAN:  (R05) Cough  (primary encounter diagnosis)  Comment: We discussed most likely post viral syndrome versus bronchitis.  I was thinking about using an antibiotics but unfortunately he has been prescribed recurrent antibiotic for chronic cellulitis and then recently for strep and he does not have any other red flag symptoms and we decided to hold off on antibiotics for now and he is on board with this plan.  Discussed with him that if his symptoms are not improving he may need to consider azithromycin.  We discussed about cough syrup with codeine being a controlled substance and side effects associated with it.  He understands no refill without seen.  Plan: guaiFENesin-codeine (ROBITUSSIN AC) 100-10         MG/5ML solution             (M54.5) " Acute bilateral low back pain without sciatica  Comment: Due to he is constantly coughing.  In the past he has had a good benefit with Toradol injection and Flexeril.  Okay to use both right now.  Plan: cyclobenzaprine (FLEXERIL) 5 MG tablet,         ketorolac (TORADOL) 60 MG/2ML injection,         ketorolac (TORADOL) injection 60 mg             (M19.011) DJD of right AC (acromioclavicular) joint  Comment: Understands is a controlled substance and not an ideal drug for long-term use.  10 pills given.  Plan: traMADol (ULTRAM) 50 MG tablet              (M75.101) Rotator cuff syndrome of right shoulder  Comment:  Plan: traMADol (ULTRAM) 50 MG tablet             (I10) Hypertension goal BP (blood pressure) < 140/90  Comment:    Plan: His blood pressure is above goal.  He is going to come back in few weeks after he recovers from above symptoms for blood pressure recheck.  He may need to adjust his blood pressure medication depending upon his response.    Follow up: See above    The above note was dictated using voice recognition. Although reviewed after completion, some word and grammatical error may remain .

## 2019-02-07 ENCOUNTER — TELEPHONE (OUTPATIENT)
Dept: FAMILY MEDICINE | Facility: CLINIC | Age: 49
End: 2019-02-07

## 2019-02-07 NOTE — LETTER
[place] [id number] [place] [id number]  Phone: [phone]       Dear Vlad,    In order to ensure we are providing the best quality care, we have reviewed your chart and see that you are due for:  1.   Blood pressure check with a medical assistant    Please call the clinic at your earliest convenience to schedule an appointment.    We greatly appreciate the opportunity to serve you.  Thank you for trusting us with your health care.    Your care team at [facility]

## 2019-07-29 ENCOUNTER — TELEPHONE (OUTPATIENT)
Dept: FAMILY MEDICINE | Facility: CLINIC | Age: 49
End: 2019-07-29

## 2019-08-02 ENCOUNTER — TELEPHONE (OUTPATIENT)
Dept: FAMILY MEDICINE | Facility: CLINIC | Age: 49
End: 2019-08-02

## 2019-08-02 NOTE — TELEPHONE ENCOUNTER
Panel Management Review      Patient has the following on his problem list:     Hypertension   Last three blood pressure readings:  BP Readings from Last 3 Encounters:   01/29/19 (!) 155/102   12/25/18 136/90   12/14/18 128/88     Blood pressure: FAILED    HTN Guidelines:  Less than 140/90      Composite cancer screening  Chart review shows that this patient is due/due soon for the following None  Summary:    Patient is due/failing the following:   BP CHECK    Action needed:   Patient needs office visit for bp check and phq-9/carolyn.    Type of outreach:    called pt but no voicemail set up to leave message    Questions for provider review:    None                                                                                                                                    Cintia Hurst CMA       Chart routed to Care Team .

## 2019-10-29 ENCOUNTER — OFFICE VISIT (OUTPATIENT)
Dept: FAMILY MEDICINE | Facility: CLINIC | Age: 49
End: 2019-10-29
Payer: COMMERCIAL

## 2019-10-29 VITALS
WEIGHT: 277 LBS | DIASTOLIC BLOOD PRESSURE: 84 MMHG | HEIGHT: 68 IN | HEART RATE: 96 BPM | TEMPERATURE: 98.4 F | OXYGEN SATURATION: 97 % | BODY MASS INDEX: 41.98 KG/M2 | RESPIRATION RATE: 18 BRPM | SYSTOLIC BLOOD PRESSURE: 146 MMHG

## 2019-10-29 DIAGNOSIS — I10 HYPERTENSION GOAL BP (BLOOD PRESSURE) < 140/90: ICD-10-CM

## 2019-10-29 DIAGNOSIS — E11.9 TYPE 2 DIABETES MELLITUS WITHOUT COMPLICATION, WITHOUT LONG-TERM CURRENT USE OF INSULIN (H): ICD-10-CM

## 2019-10-29 DIAGNOSIS — H66.42 SUPPURATIVE OTITIS MEDIA OF LEFT EAR WITHOUT RUPTURE OF TYMPANIC MEMBRANE: Primary | ICD-10-CM

## 2019-10-29 LAB — HBA1C MFR BLD: 5.9 % (ref 0–5.6)

## 2019-10-29 PROCEDURE — 36415 COLL VENOUS BLD VENIPUNCTURE: CPT | Performed by: INTERNAL MEDICINE

## 2019-10-29 PROCEDURE — 83036 HEMOGLOBIN GLYCOSYLATED A1C: CPT | Performed by: INTERNAL MEDICINE

## 2019-10-29 PROCEDURE — 99204 OFFICE O/P NEW MOD 45 MIN: CPT | Performed by: INTERNAL MEDICINE

## 2019-10-29 PROCEDURE — 80048 BASIC METABOLIC PNL TOTAL CA: CPT | Performed by: INTERNAL MEDICINE

## 2019-10-29 RX ORDER — OFLOXACIN 3 MG/ML
5 SOLUTION AURICULAR (OTIC) 2 TIMES DAILY
Qty: 5 ML | Refills: 1 | Status: ON HOLD | OUTPATIENT
Start: 2019-10-29 | End: 2020-06-07

## 2019-10-29 RX ORDER — AMOXICILLIN 500 MG/1
500 CAPSULE ORAL 3 TIMES DAILY
Qty: 21 CAPSULE | Refills: 1 | Status: SHIPPED | OUTPATIENT
Start: 2019-10-29 | End: 2019-10-29

## 2019-10-29 RX ORDER — LISINOPRIL 5 MG/1
5 TABLET ORAL DAILY
Qty: 90 TABLET | Refills: 1 | Status: SHIPPED | OUTPATIENT
Start: 2019-10-29 | End: 2020-05-28

## 2019-10-29 ASSESSMENT — MIFFLIN-ST. JEOR: SCORE: 2098.02

## 2019-10-29 ASSESSMENT — PAIN SCALES - GENERAL: PAINLEVEL: MODERATE PAIN (4)

## 2019-10-29 NOTE — LETTER
[address]  [address]  [phone]    October 30, 2019      Aaron Mercado  [address] [place] [id number]      Dear Mr. Mercado,                                Your hemoglobin A1c is worse at 5.9%, and at its highest since March 26, 2014. Please make sure you take Metformin consistently and watch your diet. I recommend repeating your A1c test within six months. For any questions, you may call my office at [phone].     Sincerely,     Kale Anthony MD   Internal Medicine

## 2019-10-29 NOTE — PROGRESS NOTES
Subjective     Aaron Mercado is a 49 year old male who presents to clinic today for the following health issues:    HPI   ENT Symptoms             Symptoms: cc Present Absent Comment   Fever/Chills   x    Fatigue  x     Muscle Aches   x    Eye Irritation   x    Sneezing   x    Nasal Robbie/Drg  x  Intermittent nasal drainage   Sinus Pressure/Pain   x    Loss of smell   x    Dental pain   x    Sore Throat   x    Swollen Glands   x    Ear Pain/Fullness  x  Bilateral, left ear worst. PCP recommends coming in since had ear pain with Hx of cellulitis earlier this year 2019, was told could be the start of recurrent. Cleaned the ear and saw blood. Ear feels like there is crust.   Cough   x    Wheeze   x    Chest Pain   x    Shortness of breath   x    Rash   x    Other   x      Symptom duration:  1week waning then waxing again   Symptom severity:  severe   Treatments tried:  none   Contacts:  none       Diabetes Follow-up      Patient is checking blood sugars: not at all    Diabetic concerns: unknown since patient does not even check his glucose.     Symptoms of hypoglycemia (low blood sugar): none     Paresthesias (numbness or burning in feet) or sores: No     Date of last diabetic eye exam: due     Recent A1c: None in the past 12 months.     Aspirin: no     Statins: no     Proteinuria: unknown     Hypertension: yes     CAD: no     CHRONIC KIDNEY DISEASE: no     CVA: no     PAD: no     Neuropathy: no     Retinopathy: no        Hypertension Follow-up      Outpatient blood pressures monitoring: no    Low Salt Diet: no    Adverse effects: none from Lisinopril    Compliance: quesitonable    Secondary causes: no    Chronic kidney disease: no    Hyperthyroidism: no    Anxiety: no    Decongestants: no    Substance abuse: no    Diabetes: no    Ischemic heart disease: no    Stroke: no    Hyperlipidemia: yes, but untreated due to patient choice.             Patient Active Problem List   Diagnosis     obese bmi over 35     Genital  warts     Eczema     Type 2 diabetes mellitus without complication (H)     Hypertension goal BP (blood pressure) < 140/90     Hyperlipidemia with target LDL less than 100     Major depressive disorder, recurrent episode, moderate (H)     Cellulitis     Acute bilateral low back pain without sciatica     Past Surgical History:   Procedure Laterality Date     C ORAL SURGERY PROCEDURE  2000    Tad teeth extraction     LASIK  2003       Social History     Tobacco Use     Smoking status: Never Smoker     Smokeless tobacco: Never Used     Tobacco comment: Non smoke home   Substance Use Topics     Alcohol use: Yes     Comment: 1-2 nights out during the week     Family History   Problem Relation Age of Onset     Diabetes Mother      Family History Negative Father      Family History Negative Sister      Family History Negative Sister      Family History Negative Brother      Family History Negative Brother      Family History Negative Brother      Family History Negative Sister      Family History Negative Son          Allergies   Allergen Reactions     Atorvastatin      myalgia     Bactrim [Sulfamethoxazole W/Trimethoprim]      hives     Recent Labs   Lab Test 10/29/19  1849 12/04/18  0915  11/18/18  2031  06/13/18  1020 05/31/18  1204 02/08/18  0840 12/14/16  1620 05/20/15  1030   A1C 5.9*  --   --  5.0  --  5.1  --  5.4 5.5 5.5   LDL  --   --   --   --   --   --   --  132* 135* 122   HDL  --   --   --   --   --   --   --  40 39* 32*   TRIG  --   --   --   --   --   --   --  110 95 80   ALT  --  51  --   --   --   --  48  --  41 41   CR 0.66 0.72   < > 0.77   < >  --  0.75 0.69 0.84 0.79   GFRESTIMATED >90 >90   < > >90   < >  --  >90 >90 >90  Non  GFR Calc   >90  Non  GFR Calc     GFRESTBLACK >90 >90   < > >90   < >  --  >90 >90 >90   GFR Calc   >90   GFR Calc     POTASSIUM 4.1 3.9   < >  --    < >  --  4.2 4.1 3.8 4.0   TSH  --   --   --   --   --   --   "1.14 2.23  --  1.36    < > = values in this interval not displayed.      BP Readings from Last 3 Encounters:   10/29/19 (!) 146/84   01/29/19 (!) 155/102   12/25/18 136/90    Wt Readings from Last 3 Encounters:   10/29/19 125.6 kg (277 lb)   01/29/19 119.4 kg (263 lb 4 oz)   12/25/18 122.4 kg (269 lb 14.4 oz)                    Reviewed and updated as needed this visit by Provider         Review of Systems   ROS COMP: CONSTITUTIONAL: NEGATIVE for fever, chills, change in weight  INTEGUMENTARY/SKIN: NEGATIVE for worrisome rashes, moles or lesions  EYES: NEGATIVE for vision changes or irritation  ENT/MOUTH: As above.  RESP: NEGATIVE for significant cough or SOB  CV: NEGATIVE for chest pain, palpitations or peripheral edema  GI: NEGATIVE for nausea, abdominal pain, heartburn, or change in bowel habits  : NEGATIVE for frequency, dysuria, or hematuria  MUSCULOSKELETAL: NEGATIVE for significant arthralgias or myalgia  NEURO: NEGATIVE for weakness, dizziness or paresthesias  ENDOCRINE: NEGATIVE for temperature intolerance, skin/hair changes  HEME: NEGATIVE for bleeding problems  PSYCHIATRIC: NEGATIVE for changes in mood or affect      Objective    BP (!) 146/84 (BP Location: Left arm, Patient Position: Chair, Cuff Size: Adult Large)   Pulse 96   Temp 98.4  F (36.9  C) (Oral)   Resp 18   Ht 1.715 m (5' 7.5\")   Wt 125.6 kg (277 lb)   SpO2 97%   BMI 42.74 kg/m    Body mass index is 42.74 kg/m .  Physical Exam   GENERAL: healthy, alert and no distress  EYES: Eyes grossly normal to inspection  HENT: normal cephalic/atraumatic, right ear: normal: no effusions, no erythema, normal landmarks, left ear: erythematous, bulging membrane and mucopurulent effusion and oral mucous membranes moist  RESP: lungs clear to auscultation - no rales, rhonchi or wheezes  CV: regular rate and rhythm, normal S1 S2, no S3 or S4, no murmur, click or rub, no peripheral edema and peripheral pulses strong  ABDOMEN: soft, nontender, no " "hepatosplenomegaly, no masses and bowel sounds normal  MS: no gross musculoskeletal defects noted, no edema  SKIN: no suspicious lesions or rashes  NEURO: Normal strength and tone, mentation intact and speech normal  PSYCH: mentation appears normal, affect normal/bright    Diagnostic Test Results:  Results for orders placed or performed in visit on 10/29/19   Hemoglobin A1c     Status: Abnormal   Result Value Ref Range    Hemoglobin A1C 5.9 (H) 0 - 5.6 %   Basic metabolic panel  (Ca, Cl, CO2, Creat, Gluc, K, Na, BUN)     Status: Abnormal   Result Value Ref Range    Sodium 139 133 - 144 mmol/L    Potassium 4.1 3.4 - 5.3 mmol/L    Chloride 106 94 - 109 mmol/L    Carbon Dioxide 27 20 - 32 mmol/L    Anion Gap 6 3 - 14 mmol/L    Glucose 140 (H) 70 - 99 mg/dL    Urea Nitrogen 19 7 - 30 mg/dL    Creatinine 0.66 0.66 - 1.25 mg/dL    GFR Estimate >90 >60 mL/min/[1.73_m2]    GFR Estimate If Black >90 >60 mL/min/[1.73_m2]    Calcium 9.5 8.5 - 10.1 mg/dL           Assessment & Plan     1. Suppurative otitis media of left ear without rupture of tympanic membrane    - ofloxacin (FLOXIN) 0.3 % otic solution; Place 5 drops Into the left ear 2 times daily  Dispense: 5 mL; Refill: 1  - amoxicillin-clavulanate (AUGMENTIN) 875-125 MG tablet; Take 1 tablet by mouth 2 times daily for 10 days  Dispense: 20 tablet; Refill: 0    2. Type 2 diabetes mellitus without complication, without long-term current use of insulin (H)    - Hemoglobin A1c  - Basic metabolic panel  (Ca, Cl, CO2, Creat, Gluc, K, Na, BUN)  - metFORMIN (GLUCOPHAGE) 500 MG tablet; Take 1 tablet (500 mg) by mouth daily (with dinner)  Dispense: 90 tablet; Refill: 1    3. Hypertension goal BP (blood pressure) < 140/90    - lisinopril (PRINIVIL/ZESTRIL) 5 MG tablet; Take 1 tablet (5 mg) by mouth daily  Dispense: 90 tablet; Refill: 1     BMI:   Estimated body mass index is 42.74 kg/m  as calculated from the following:    Height as of this encounter: 1.715 m (5' 7.5\").    Weight as " of this encounter: 125.6 kg (277 lb).   Weight management plan: diet and exercise.        FUTURE APPOINTMENTS:       - Follow-up visit in 6 months.    Kale Anthony MD  [facility]

## 2019-10-29 NOTE — PATIENT INSTRUCTIONS
At [facility], we strive to deliver an exceptional experience to you, every time we see you.  If you receive a survey in the mail, please send us back your thoughts. We really do value your feedback.    Based on your medical history, these are the current health maintenance/preventive care services that you are due for (some may have been done at this visit.)  Health Maintenance Due   Topic Date Due     PREVENTIVE CARE VISIT  06/11/2009     EYE EXAM  01/21/2015     LIPID  02/08/2019     MICROALBUMIN  02/08/2019     A1C  05/18/2019     PHQ-9  05/27/2019     DIABETIC FOOT EXAM  05/31/2019     INFLUENZA VACCINE (1) 09/01/2019         Suggested websites for health information:  Www.[employer]org : Up to date and easily searchable information on multiple topics.  Www.medlineplus.gov : medication info, interactive tutorials, watch real surgeries online  Www.familydoctor.org : good info from the Academy of Family Physicians  Www.cdc.gov : public health info, travel advisories, epidemics (H1N1)  Www.aap.org : children's health info, normal development, vaccinations  Www.health.[facility].[place].us : [place] dept of health, public health issues in [place], N1N1    Your care team:                            Family Medicine Internal Medicine   MD Kale Carrizales MD Shantel Branch-Fleming, MD Katya Georgiev PA-C Nam Ho, MD Pediatrics   DOMINIC Parker, MD Maria Guadalupe Yang CNP, MD Deborah Mielke, MD Kim Thein, APRN CNP      Clinic hours: Monday - Thursday 7 am-7 pm; Fridays 7 am-5 pm.   Urgent care: Monday - Friday 11 am-9 pm; Saturday and Sunday 9 am-5 pm.  Pharmacy : Monday -Thursday 8 am-8 pm; Friday 8 am-6 pm; Saturday and Sunday 9 am-5 pm.     Clinic: [phone]   Pharmacy: [phone]      Patient Education     Middle Ear Infection (Adult)  You have an infection of the middle ear, the space behind the eardrum. This is also  called acute otitis media (AOM). Sometimes it is caused by the common cold. This is because congestion can block the internal passage (eustachian tube) that drains fluid from the middle ear. When the middle ear fills with fluid, bacteria can grow there and cause an infection. Oral antibiotics are used to treat this illness, not ear drops. Symptoms usually start to improve within 1 to 2 days of treatment.    Home care  The following are general care guidelines:    Finish all of the antibiotic medicine given, even though you may feel better after the first few days.    You may use over-the-counter medicine, such as acetaminophen or ibuprofen, to control pain and fever, unless something else was prescribed. If you have chronic liver or kidney disease or have ever had a stomach ulcer or gastrointestinal bleeding, talk with your healthcare provider before using these medicines. Do not give aspirin to anyone under 18 years of age who has a fever. It may cause severe illness or death.  Follow-up care  Follow up with your healthcare provider, or as advised, in 2 weeks if all symptoms have not gotten better, or if hearing doesn't go back to normal within 1 month.  When to seek medical advice  Call your healthcare provider right away if any of these occur:    Ear pain gets worse or does not improve after 3 days of treatment    Unusual drowsiness or confusion    Neck pain, stiff neck, or headache    Fluid or blood draining from the ear canal    Fever of 100.4 F (38 C) or as advised     Seizure  Date Last Reviewed: 6/1/2016 2000-2018 [employer]. [address] [place] [address], [place] [address]. All rights reserved. This information is not intended as a substitute for professional medical care. Always follow your healthcare professional's instructions.

## 2019-10-30 LAB
ANION GAP SERPL CALCULATED.3IONS-SCNC: 6 MMOL/L (ref 3–14)
BUN SERPL-MCNC: 19 MG/DL (ref 7–30)
CALCIUM SERPL-MCNC: 9.5 MG/DL (ref 8.5–10.1)
CHLORIDE SERPL-SCNC: 106 MMOL/L (ref 94–109)
CO2 SERPL-SCNC: 27 MMOL/L (ref 20–32)
CREAT SERPL-MCNC: 0.66 MG/DL (ref 0.66–1.25)
GFR SERPL CREATININE-BSD FRML MDRD: >90 ML/MIN/{1.73_M2}
GLUCOSE SERPL-MCNC: 140 MG/DL (ref 70–99)
POTASSIUM SERPL-SCNC: 4.1 MMOL/L (ref 3.4–5.3)
SODIUM SERPL-SCNC: 139 MMOL/L (ref 133–144)

## 2020-02-07 ENCOUNTER — TELEPHONE (OUTPATIENT)
Dept: FAMILY MEDICINE | Facility: CLINIC | Age: 50
End: 2020-02-07

## 2020-02-07 NOTE — TELEPHONE ENCOUNTER
Panel Management Review      Patient has the following on his problem list:     Hypertension   Last three blood pressure readings:  BP Readings from Last 3 Encounters:   10/29/19 (!) 146/84   01/29/19 (!) 155/102   12/25/18 136/90     Blood pressure: FAILED    HTN Guidelines:  Less than 140/90      Composite cancer screening  Chart review shows that this patient is due/due soon for the following None  Summary:    Patient is due/failing the following:   BP CHECK    Action needed:   Patient needs office visit for bp with pcp.    Type of outreach:    Phone, left message for patient to call back.     Questions for provider review:    None                                                                                                                                    Cintia Hurst CMA       Chart routed to Care Team .

## 2020-03-27 ENCOUNTER — VIRTUAL VISIT (OUTPATIENT)
Dept: FAMILY MEDICINE | Facility: CLINIC | Age: 50
End: 2020-03-27
Payer: COMMERCIAL

## 2020-03-27 ENCOUNTER — VIRTUAL VISIT (OUTPATIENT)
Dept: FAMILY MEDICINE | Facility: OTHER | Age: 50
End: 2020-03-27

## 2020-03-27 DIAGNOSIS — H93.8X2 PRESSURE SENSATION IN LEFT EAR: ICD-10-CM

## 2020-03-27 DIAGNOSIS — H92.12 OTORRHEA OF LEFT EAR: Primary | ICD-10-CM

## 2020-03-27 PROCEDURE — 99213 OFFICE O/P EST LOW 20 MIN: CPT | Mod: TEL | Performed by: NURSE PRACTITIONER

## 2020-03-27 RX ORDER — OFLOXACIN 3 MG/ML
5 SOLUTION AURICULAR (OTIC) DAILY
Qty: 5 ML | Refills: 0 | Status: SHIPPED | OUTPATIENT
Start: 2020-03-27 | End: 2020-05-26

## 2020-03-27 NOTE — PROGRESS NOTES
"Aaron Mercado is a 49 year old male who is being evaluated via a billable telephone visit.      The patient has been notified of following:     \"This telephone visit will be conducted via a call between you and your physician/provider. We have found that certain health care needs can be provided without the need for a physical exam.  This service lets us provide the care you need with a short phone conversation.  If a prescription is necessary we can send it directly to your pharmacy.  If lab work is needed we can place an order for that and you can then stop by our lab to have the test done at a later time.    If during the course of the call the physician/provider feels a telephone visit is not appropriate, you will not be charged for this service.\"     Aaron Mercado complains of No chief complaint on file.      I have reviewed and updated the patient's Past Medical History, Social History, Family History and Medication List.    ALLERGIES  Atorvastatin and Bactrim [sulfamethoxazole w/trimethoprim]    bilat ear pain started yesterday.  Fullness sensation, is having drainage.  Not worse when he pulls on ear lobe or chews.  Blood-tinged drainage, no odor.  No itchiness in ears.  No fever or recent injuries.  No hx ear tubes. No recent swimming or flying.    Thinks related to poor air quality at work.  Similar symptoms 10/2019, seen in UC.  Was treated with ofloxaciin and augmentin for AOM.    Left ear draining, clear, only at night when laying down.  No pain, feels congested.  No URI or fever.  Works at [employer].  No changes in hearing.  Fluid gets crusty overnight.    DM2- on metformin 500mg daily. A1C 10/2019 5.9%    HTN- on lisinopril 5mg daily        Assessment/Plan:  (H92.12) Otorrhea of left ear  (primary encounter diagnosis)  (H93.8X2) Pressure sensation in left ear  Comment: no ear pain or fever to suggest AOM, no pain with manipulation of ear or risk factors for otitis externa.  Clear drainage could be " normal physiologic from cerumen.  He does have some congestion which could suggest effusion  Plan: ofloxacin (FLOXIN) 0.3 % otic solution        Pt is requesting antibiotics, discussed no symptoms to suggest bacterial infection, reviewed risks of oral antibiotics including side effects and contributing to community bacterial resistance.  We can try to floxin drops which are lower risk.  Also recommend antihistamine like zyrtec.  Follow up if not improving in 2 weeks, sooner if fever, pain, or worsening symptoms.        Phone call duration:  7 minutes    GILLIAN Thompson CNP

## 2020-03-27 NOTE — PROGRESS NOTES
"Date: 2020 11:38:16  Clinician: Alexis Alfaro  Clinician NPI: [id number]  Patient: Aaron Mercado  Patient : 1970  Patient Address: [address] [place], [place] [id number]  Patient Phone: [phone]  Visit Protocol: Ear pain  Patient Summary:  Aaron is a 49 year old ( : 1970 ) male who initiated a Visit for swimmer's ear (ear pain). When asked the question \"Please sign me up to receive news, health information and promotions from [facility].\", Aaron responded \"Yes\".    Aaron reports that his ear pain started yesterday. The ear pain is located inside both ears.   In addition to the ear pain, Aaron is experiencing redness and a feeling of fullness in the ear(s). Aaron reports having fluid draining from the ear(s).   Symptom Details     Pain: Aaron is experiencing moderate pain (4-6 on a 10 point pain scale). It does not get worse when he gently pulls on the earlobe(s) and eats or chews.     Drainage: The color of the fluid coming out of his ear(s) is blood-tinged. The fluid does not have a bad odor.      Aaron denies tenderness and itchiness in the ear(s). He also denies recent injuries near the ear(s), feeling feverish, ever having ear tubes, and the possibility of a foreign object in the ear(s).   Precipitating events   Aaron denies swimming and flying within the past week.   Pertinent medical history   Weight: 260 lbs   He does not smoke or use smokeless tobacco.   Weight: 260 lbs    MEDICATIONS: lisinopril-hydrochlorothiazide oral, metformin oral, ALLERGIES: Sudafed PE  Clinician Response:  Dear Aaron,   I am sorry you are not feeling well. To determine the most appropriate care for you, I would like you to be seen in person to further discuss your health history and symptoms.  You will not be charged for this Visit. Thank you for trusting us with your care.   Diagnosis: Refer for additional evaluation  Diagnosis ICD: R69  "

## 2020-05-26 ENCOUNTER — APPOINTMENT (OUTPATIENT)
Dept: GENERAL RADIOLOGY | Facility: CLINIC | Age: 50
End: 2020-05-26
Attending: EMERGENCY MEDICINE
Payer: COMMERCIAL

## 2020-05-26 ENCOUNTER — NURSE TRIAGE (OUTPATIENT)
Dept: NURSING | Facility: CLINIC | Age: 50
End: 2020-05-26

## 2020-05-26 ENCOUNTER — HOSPITAL ENCOUNTER (EMERGENCY)
Facility: CLINIC | Age: 50
Discharge: HOME | End: 2020-05-26
Attending: EMERGENCY MEDICINE | Admitting: EMERGENCY MEDICINE
Payer: COMMERCIAL

## 2020-05-26 VITALS
HEART RATE: 93 BPM | DIASTOLIC BLOOD PRESSURE: 73 MMHG | SYSTOLIC BLOOD PRESSURE: 126 MMHG | TEMPERATURE: 98.7 F | RESPIRATION RATE: 14 BRPM | OXYGEN SATURATION: 100 %

## 2020-05-26 DIAGNOSIS — R50.9 FEVER, UNSPECIFIED FEVER CAUSE: ICD-10-CM

## 2020-05-26 DIAGNOSIS — U07.1 CLINICAL DIAGNOSIS OF COVID-19: ICD-10-CM

## 2020-05-26 LAB
ALBUMIN UR-MCNC: 10 MG/DL
ANION GAP SERPL CALCULATED.3IONS-SCNC: 5 MMOL/L (ref 3–14)
APPEARANCE UR: CLEAR
BASOPHILS # BLD AUTO: 0 10E9/L (ref 0–0.2)
BASOPHILS NFR BLD AUTO: 0.3 %
BILIRUB UR QL STRIP: NEGATIVE
BUN SERPL-MCNC: 12 MG/DL (ref 7–30)
CALCIUM SERPL-MCNC: 8.6 MG/DL (ref 8.5–10.1)
CHLORIDE SERPL-SCNC: 101 MMOL/L (ref 94–109)
CO2 SERPL-SCNC: 25 MMOL/L (ref 20–32)
COLOR UR AUTO: YELLOW
CREAT SERPL-MCNC: 0.81 MG/DL (ref 0.66–1.25)
CRP SERPL-MCNC: 82 MG/L (ref 0–8)
DIFFERENTIAL METHOD BLD: ABNORMAL
EOSINOPHIL # BLD AUTO: 0 10E9/L (ref 0–0.7)
EOSINOPHIL NFR BLD AUTO: 0 %
ERYTHROCYTE [DISTWIDTH] IN BLOOD BY AUTOMATED COUNT: 12.4 % (ref 10–15)
ERYTHROCYTE [SEDIMENTATION RATE] IN BLOOD BY WESTERGREN METHOD: 11 MM/H (ref 0–15)
GFR SERPL CREATININE-BSD FRML MDRD: >90 ML/MIN/{1.73_M2}
GLUCOSE SERPL-MCNC: 139 MG/DL (ref 70–99)
GLUCOSE UR STRIP-MCNC: NEGATIVE MG/DL
HCT VFR BLD AUTO: 44.7 % (ref 40–53)
HGB BLD-MCNC: 15.7 G/DL (ref 13.3–17.7)
HGB UR QL STRIP: NEGATIVE
IMM GRANULOCYTES # BLD: 0 10E9/L (ref 0–0.4)
IMM GRANULOCYTES NFR BLD: 0.3 %
KETONES UR STRIP-MCNC: NEGATIVE MG/DL
LEUKOCYTE ESTERASE UR QL STRIP: NEGATIVE
LYMPHOCYTES # BLD AUTO: 0.6 10E9/L (ref 0.8–5.3)
LYMPHOCYTES NFR BLD AUTO: 8.9 %
MCH RBC QN AUTO: 32.8 PG (ref 26.5–33)
MCHC RBC AUTO-ENTMCNC: 35.1 G/DL (ref 31.5–36.5)
MCV RBC AUTO: 94 FL (ref 78–100)
MONOCYTES # BLD AUTO: 0.3 10E9/L (ref 0–1.3)
MONOCYTES NFR BLD AUTO: 4.6 %
NEUTROPHILS # BLD AUTO: 6.2 10E9/L (ref 1.6–8.3)
NEUTROPHILS NFR BLD AUTO: 85.9 %
NITRATE UR QL: NEGATIVE
NRBC # BLD AUTO: 0 10*3/UL
NRBC BLD AUTO-RTO: 0 /100
PH UR STRIP: 6 PH (ref 5–7)
PLATELET # BLD AUTO: 183 10E9/L (ref 150–450)
POTASSIUM SERPL-SCNC: 3.9 MMOL/L (ref 3.4–5.3)
RBC # BLD AUTO: 4.78 10E12/L (ref 4.4–5.9)
RBC #/AREA URNS AUTO: <1 /HPF (ref 0–2)
SARS-COV-2 PCR COMMENT: ABNORMAL
SARS-COV-2 RNA SPEC QL NAA+PROBE: NORMAL
SARS-COV-2 RNA SPEC QL NAA+PROBE: POSITIVE
SODIUM SERPL-SCNC: 131 MMOL/L (ref 133–144)
SOURCE: ABNORMAL
SP GR UR STRIP: 1.01 (ref 1–1.03)
SPECIMEN SOURCE: ABNORMAL
SPECIMEN SOURCE: NORMAL
UROBILINOGEN UR STRIP-MCNC: NORMAL MG/DL (ref 0–2)
WBC # BLD AUTO: 7.2 10E9/L (ref 4–11)
WBC #/AREA URNS AUTO: <1 /HPF (ref 0–5)

## 2020-05-26 PROCEDURE — 96374 THER/PROPH/DIAG INJ IV PUSH: CPT | Performed by: EMERGENCY MEDICINE

## 2020-05-26 PROCEDURE — 96361 HYDRATE IV INFUSION ADD-ON: CPT | Performed by: EMERGENCY MEDICINE

## 2020-05-26 PROCEDURE — 99284 EMERGENCY DEPT VISIT MOD MDM: CPT | Mod: Z6 | Performed by: EMERGENCY MEDICINE

## 2020-05-26 PROCEDURE — 81001 URINALYSIS AUTO W/SCOPE: CPT | Performed by: EMERGENCY MEDICINE

## 2020-05-26 PROCEDURE — 86140 C-REACTIVE PROTEIN: CPT | Performed by: EMERGENCY MEDICINE

## 2020-05-26 PROCEDURE — 80048 BASIC METABOLIC PNL TOTAL CA: CPT | Performed by: EMERGENCY MEDICINE

## 2020-05-26 PROCEDURE — 85025 COMPLETE CBC W/AUTO DIFF WBC: CPT | Performed by: EMERGENCY MEDICINE

## 2020-05-26 PROCEDURE — 85652 RBC SED RATE AUTOMATED: CPT | Performed by: EMERGENCY MEDICINE

## 2020-05-26 PROCEDURE — 25000132 ZZH RX MED GY IP 250 OP 250 PS 637: Performed by: EMERGENCY MEDICINE

## 2020-05-26 PROCEDURE — 71045 X-RAY EXAM CHEST 1 VIEW: CPT

## 2020-05-26 PROCEDURE — 87086 URINE CULTURE/COLONY COUNT: CPT | Performed by: EMERGENCY MEDICINE

## 2020-05-26 PROCEDURE — 25000128 H RX IP 250 OP 636: Performed by: EMERGENCY MEDICINE

## 2020-05-26 PROCEDURE — 25800030 ZZH RX IP 258 OP 636: Performed by: EMERGENCY MEDICINE

## 2020-05-26 PROCEDURE — 99284 EMERGENCY DEPT VISIT MOD MDM: CPT | Mod: 25 | Performed by: EMERGENCY MEDICINE

## 2020-05-26 RX ORDER — ACETAMINOPHEN 325 MG/1
650 TABLET ORAL ONCE
Status: COMPLETED | OUTPATIENT
Start: 2020-05-26 | End: 2020-05-26

## 2020-05-26 RX ORDER — KETOROLAC TROMETHAMINE 30 MG/ML
30 INJECTION, SOLUTION INTRAMUSCULAR; INTRAVENOUS ONCE
Status: COMPLETED | OUTPATIENT
Start: 2020-05-26 | End: 2020-05-26

## 2020-05-26 RX ORDER — SODIUM CHLORIDE 9 MG/ML
1000 INJECTION, SOLUTION INTRAVENOUS CONTINUOUS
Status: DISCONTINUED | OUTPATIENT
Start: 2020-05-26 | End: 2020-05-26 | Stop reason: HOSPADM

## 2020-05-26 RX ORDER — AZITHROMYCIN 250 MG/1
TABLET, FILM COATED ORAL
Qty: 6 TABLET | Refills: 0 | Status: ON HOLD | OUTPATIENT
Start: 2020-05-26 | End: 2020-06-07

## 2020-05-26 RX ADMIN — KETOROLAC TROMETHAMINE 30 MG: 30 INJECTION, SOLUTION INTRAMUSCULAR at 08:26

## 2020-05-26 RX ADMIN — ACETAMINOPHEN 650 MG: 325 TABLET, FILM COATED ORAL at 08:24

## 2020-05-26 RX ADMIN — SODIUM CHLORIDE 1000 ML: 9 INJECTION, SOLUTION INTRAVENOUS at 10:06

## 2020-05-26 RX ADMIN — SODIUM CHLORIDE 1000 ML: 9 INJECTION, SOLUTION INTRAVENOUS at 08:25

## 2020-05-26 ASSESSMENT — ENCOUNTER SYMPTOMS
NAUSEA: 0
SHORTNESS OF BREATH: 0
COUGH: 1
ABDOMINAL PAIN: 0
DIZZINESS: 0
DIAPHORESIS: 1
LIGHT-HEADEDNESS: 0
FEVER: 0
CHILLS: 1
VOMITING: 0
FATIGUE: 1

## 2020-05-26 NOTE — DISCHARGE INSTRUCTIONS
TODAY'S VISIT  YOU Have TESTED positive FOR COVID-19 (NOVEL CORONAVIRUS).   -  The you have the disease COVID-19.  -  It has been determined that you do not medically need to be hospitalized at this time, and  you can be monitored with self-isolation at home.     YOUR TESTS FOR THIS ILLNESS Have resulted positive for coronavirus.   -  These tests are performed by the [place] Department [facility].  -  You will also be contacted by the [place] [facility].   -  Please remain under home isolation precautions until your healthcare provider and/or state and local health department tell you it is safe, and you no longer need to self-isolate at home.     SELF-ISOLATION INSTRUCTIONS  STAY HOME. Do not go to work, school, or public areas. Avoid using public transportation, ride-sharing (Uber/Lyft), or taxis. You should only leave your home if you require medical attention (See instructions below, please contact your provider first.)   SEPARATE YOURSELF FROM OTHER PEOPLE AND ANIMALS. As much as possible, you should stay in a specific room and away from other people in your home. You should use a separate bathroom, if available. Avoid contact with pets and other animals. When possible, have another household member care for your  family and animals while you are sick.   DO NOT SHARE HOUSEHOLD ITEMS. Do not share dishes, drinking glasses, eating utensils, towels, bedding, etc., with others or pets in your home. These items should be washed with soap and water.   WEAR A FACEMASK. Wear a facemask if you need to be around other people, and cover your mouth and nose with tissue when you cough or sneeze.  WASH YOUR HANDS OFTEN. Wash your hands with soap and water for at least 20 second, or use hand  regularly. Avoid touching your face with unwashed hands.     SELF-MONITORING INSTRUCTIONS  SEEK PROMPT MEDICAL ATTENTION IF YOUR ILLNESS IS WORSENING. Watch closely for new or worsening symptoms, such as fever,  cough, shortness of breath or difficulty breathing.   SIGN UP FOR [employer]. Sign up for the [employer] application, using the information at the end of this document. Your results and a message about those results can be sent through [employer]. If you do not have [employer], we will call you with your results, but it may take longer.  IF YOU NEED MEDICAL CARE OR HAVE A MEDICAL APPOINTMENT (and it is not an emergency):   -  CALL YOUR HEALTHCARE PROVIDER BEFORE GOING TO THE [facility]  -  TELL THEM THAT YOU ARE BEING TESTING FOR AND MAY HAVE COVID-19.  YOUR CLOSE CONTACTS SHOULD MONITOR THEIR HEALTH. If your close contacts develop symptoms, they should visit [email or web address] to determine if testing is needed, and where this is done.   If you have any questions, please contact your usual health care provider or the [place] [facility] ([facility]) at [phone]    EMERGENCY INSTRUCTIONS  IF YOU NEED EMERGENCY MEDICAL ATTENTION, CALL 911 AND LET THEM KNOW YOU MAY HAVE ARE BEING EVALUATED FOR COVID-19.      WHAT IS COVID-19 (CORONAVIRUS)  COVID-19 is a viral respiratory illness caused by a newly identified coronavirus that was discovered in late 2019 in [place]. Coronaviruses are a large family of viruses. Some coronaviruses cause illnesses in people and others only circulate among animals. Rarely, animal coronaviruses can evolve and infect people. The virus causing COVID-19 may have emerged from an animal source, and it is now able to spread from person to person.     How does it spread?   The virus is thought to spread between people who are in close contact (within about six feet) through respiratory droplets produced when an infected person coughs, sneezes, or talks. It may also spread when one person touches a surface or object that has the virus on it and then touches their mouth, nose, or eyes. However, this is not thought to be the main way the virus is transmitted.    SYMPTOMS  This coronavirus causes mild to  severe respiratory illness with often with fever, cough, and/or difficulty breathing. After exposure, symptoms typically present within 2 to 14 days.     TREATMENTS AND PREVENTION  Patients may also be asked to self-quarantine at home in order to prevent the spread of the coronavirus. There is no antiviral treatment recommended for COVID-19. People with COVID-19 may receive supportive care to help relieve symptoms.    Prevention  No vaccine is currently available for the coronavirus causing COVID-19. The best way to prevent spread of the illness is to avoid exposure through simple precautions. Prevention steps include:   Stay home if you're feeling sick.   Avoid close contact with others, and try to stay at least 6-feet away from others if you're ill.   Wash your hands often with soap and warm water for at least 20 seconds, especially after going to the bathroom; before eating; and after blowing your nose, coughing, or sneezing. Use an alcohol-based hand  with at least 60 percent alcohol if soap and water are not readily available.   Clean and disinfect frequently touched objects and surfaces using a regular household cleaning spray or wipe.     Thank you for your understanding and cooperation.

## 2020-05-26 NOTE — ED AVS SNAPSHOT
[facility], [place], Emergency Department  [address]  [facility] [address]  Phone:  [phone]  Fax:  [phone]                                    Aaron Mercado   MRN: [id number]    Department:  [facility], Emergency Department   Date of Visit:  5/26/2020           After Visit Summary Signature Page    I have received my discharge instructions, and my questions have been answered. I have discussed any challenges I see with this plan with the nurse or doctor.    ..........................................................................................................................................  Patient/Patient Representative Signature      ..........................................................................................................................................  Patient Representative Print Name and Relationship to Patient    ..................................................               ................................................  Date                                   Time    ..........................................................................................................................................  Reviewed by Signature/Title    ...................................................              ..............................................  Date                                               Time          22EPIC Rev 08/18

## 2020-05-26 NOTE — TELEPHONE ENCOUNTER
Vlad feels he needs to get tested for Covid.  Fever for one week.  Cough is also present and headache.  Shortness of breath is also present. Vlad states that he needs to get tested as he is still working.      COVID 19 Nurse Triage Plan/Patient Instructions    Please be aware that novel coronavirus (COVID-19) may be circulating in the community. If you develop symptoms such as fever, cough, or SOB or if you have concerns about the presence of another infection including coronavirus (COVID-19), please contact your health care provider or visit [email or web address].     Disposition/Instructions    Patient to have an [facility] Visit with a provider (Preferred option). Follow System Ambulatory Workflow for COVID 19.     To do this follow these instructions:    1. Go to the website [email or web address]  2. Create an account (you will need your insurance information)  3. Start a new visit  4. Choose your diagnosis (e.g. COVID19)  5. Fill out the information about your symptoms  6. A provider will reach out to you by text, phone call or video visit based on your request    Call Back If: Your symptoms worsen before you are able to complete your [facility] Visit with a provider.    Thank you for limiting contact with others, wearing a simple mask to cover your cough, practice good hand hygiene habits and accessing our virtual services where possible to limit the spread of this virus.    For more information about COVID19 and options for caring for yourself at home, please visit the CDC website at https://www.cdc.gov/coronavirus/2019-ncov/about/steps-when-sick.html  For more options for care at [facility], please visit our website at [email or web address]    For more information, please use the [place] Department [facility] COVID-19 Website: https://www.health.state.[place]/diseases/coronavirus/index.html  [place] [facility] ([facility]) COVID-19 Hotlines (Interpreters available):      Health questions:  Phone Number: 540.621.9453 or [phone] and Hours: 7 a.m. to 7 p.m.    Schools and  questions: Phone Number: [phone] or [phone] and Hours 7 a.m. to 7 p.m.                   Reason for Disposition    Fever present > 3 days (72 hours)    Additional Information    Negative: SEVERE difficulty breathing (e.g., struggling for each breath, speaks in single words)    Negative: Difficult to awaken or acting confused (e.g., disoriented, slurred speech)    Negative: Bluish (or gray) lips or face now    Negative: Shock suspected (e.g., cold/pale/clammy skin, too weak to stand, low BP, rapid pulse)    Negative: Sounds like a life-threatening emergency to the triager    Negative: SEVERE or constant chest pain (Exception: mild central chest pain, present only when coughing)    Negative: MODERATE difficulty breathing (e.g., speaks in phrases, SOB even at rest, pulse 100-120)    Negative: Patient sounds very sick or weak to the triager    Negative: MILD difficulty breathing (e.g., minimal/no SOB at rest, SOB with walking, pulse <100)    Negative: Chest pain    Negative: Fever > 103 F (39.4 C)    Negative: [1] Fever > 101 F (38.3 C) AND [2] age > 60    Negative: [1] Fever > 100.0 F (37.8 C) AND [2] bedridden (e.g., nursing home patient, CVA, chronic illness, recovering from surgery)    Negative: HIGH RISK patient (e.g., age > 64 years, diabetes, heart or lung disease, weak immune system)    Protocols used: CORONAVIRUS (COVID-19) DIAGNOSED OR [id number] 4.22.20

## 2020-05-26 NOTE — ED PROVIDER NOTES
ED Provider Note  [facility]      History     Chief Complaint   Patient presents with     Headache     headache for 3 days. sweating, leg pain     HPI  Aaron Mercado is a 49 year old male with hx of DM2 on metformin and HTN who presents to the ER for 1 week of intermittent headache and bodyaches.  Pt says that he has been feeling generalized unwell since last Tuesday.  Patient says that he would go home from working at [employer] and would feel very tired and would wake up with his clothes drenched in sweat.  Pt says that he gets a temperature everyday at work and he has not had a fever.  Pt says that today he was feeling worse so checked in on oncare and it said he should go to the ER. Pt notes 1 day of cough last week.  No cough today.  No SOB. No nausea or vomiting.  Pt ran out of his metformin on Saturday so has not taken for past 2 days.  Pt notes increased urinary frequency.  Pt also has not been taking his BP medications the last couple days.      Past Medical History  Past Medical History:   Diagnosis Date     Diabetes (H)      Hypertension      Obesity      Past Surgical History:   Procedure Laterality Date     C ORAL SURGERY PROCEDURE  2000    [place] teeth extraction     LASIK  2003     acetaminophen (TYLENOL) 325 MG tablet  lisinopril (PRINIVIL/ZESTRIL) 5 MG tablet  metFORMIN (GLUCOPHAGE) 500 MG tablet  cyclobenzaprine (FLEXERIL) 5 MG tablet  ofloxacin (FLOXIN) 0.3 % otic solution  traMADol (ULTRAM) 50 MG tablet      Allergies   Allergen Reactions     Atorvastatin      myalgia     Bactrim [Sulfamethoxazole W/Trimethoprim]      hives     Past medical history, past surgical history, medications, and allergies were reviewed with the patient. Additional pertinent items: None    Family History  Family History   Problem Relation Age of Onset     Diabetes Mother      Family History Negative Father      Family History Negative Sister      Family History Negative Sister      Family  History Negative Brother      Family History Negative Brother      Family History Negative Brother      Family History Negative Sister      Family History Negative Son      Family history was reviewed with the patient. Additional pertinent items: None    Social History  Social History     Tobacco Use     Smoking status: Never Smoker     Smokeless tobacco: Never Used     Tobacco comment: Non smoke home   Substance Use Topics     Alcohol use: Yes     Comment: 1-2 nights out during the week     Drug use: No      Social history was reviewed with the patient. Additional pertinent items: None    Review of Systems   Constitutional: Positive for chills, diaphoresis and fatigue. Negative for fever.   Respiratory: Positive for cough. Negative for shortness of breath.    Gastrointestinal: Negative for abdominal pain, nausea and vomiting.   Neurological: Negative for dizziness and light-headedness.   All other systems reviewed and are negative.    A complete review of systems was performed with pertinent positives and negatives noted in the HPI, and all other systems negative.    Physical Exam   BP: (!) 136/94  Pulse: 116  Temp: 100.5  F (38.1  C)  Resp: 16  SpO2: 100 %  Physical Exam  Constitutional:       Appearance: Normal appearance. He is well-developed. He is obese.   HENT:      Head: Normocephalic and atraumatic.      Nose: Nose normal.      Mouth/Throat:      Mouth: Mucous membranes are moist.   Neck:      Musculoskeletal: Normal range of motion and neck supple. No neck rigidity or muscular tenderness.   Cardiovascular:      Rate and Rhythm: Regular rhythm. Tachycardia present.      Heart sounds: Normal heart sounds.   Pulmonary:      Effort: Pulmonary effort is normal. No respiratory distress.      Comments: No conversational dyspnea  Abdominal:      General: There is no distension.      Palpations: Abdomen is soft.      Tenderness: There is no abdominal tenderness. There is no rebound.   Musculoskeletal:          General: No swelling or tenderness.   Skin:     General: Skin is warm.   Neurological:      Mental Status: He is alert and oriented to person, place, and time.   Psychiatric:         Behavior: Behavior normal.         Thought Content: Thought content normal.         ED Course      Procedures                   Results for orders placed or performed during the hospital encounter of 05/26/20   XR Chest Port 1 View     Status: None    Narrative    CHEST PORTABLE ONE VIEW   5/26/2020 9:46 AM     HISTORY: Fever, cough.    COMPARISON: None.      Impression    IMPRESSION: Portable view of the chest. Patchy bibasilar airspace  opacities are present concerning for pneumonia. Upper lungs remain  clear. No pneumothorax or pleural effusions. Heart is normal in size.    JORGE GRAVES MD   CBC with platelets differential     Status: Abnormal   Result Value Ref Range    WBC 7.2 4.0 - 11.0 10e9/L    RBC Count 4.78 4.4 - 5.9 10e12/L    Hemoglobin 15.7 13.3 - 17.7 g/dL    Hematocrit 44.7 40.0 - 53.0 %    MCV 94 78 - 100 fl    MCH 32.8 26.5 - 33.0 pg    MCHC 35.1 31.5 - 36.5 g/dL    RDW 12.4 10.0 - 15.0 %    Platelet Count 183 150 - 450 10e9/L    Diff Method Automated Method     % Neutrophils 85.9 %    % Lymphocytes 8.9 %    % Monocytes 4.6 %    % Eosinophils 0.0 %    % Basophils 0.3 %    % Immature Granulocytes 0.3 %    Nucleated RBCs 0 0 /100    Absolute Neutrophil 6.2 1.6 - 8.3 10e9/L    Absolute Lymphocytes 0.6 (L) 0.8 - 5.3 10e9/L    Absolute Monocytes 0.3 0.0 - 1.3 10e9/L    Absolute Eosinophils 0.0 0.0 - 0.7 10e9/L    Absolute Basophils 0.0 0.0 - 0.2 10e9/L    Abs Immature Granulocytes 0.0 0 - 0.4 10e9/L    Absolute Nucleated RBC 0.0    Basic metabolic panel     Status: Abnormal   Result Value Ref Range    Sodium 131 (L) 133 - 144 mmol/L    Potassium 3.9 3.4 - 5.3 mmol/L    Chloride 101 94 - 109 mmol/L    Carbon Dioxide 25 20 - 32 mmol/L    Anion Gap 5 3 - 14 mmol/L    Glucose 139 (H) 70 - 99 mg/dL    Urea Nitrogen 12 7 - 30 mg/dL     Creatinine 0.81 0.66 - 1.25 mg/dL    GFR Estimate >90 >60 mL/min/[1.73_m2]    GFR Estimate If Black >90 >60 mL/min/[1.73_m2]    Calcium 8.6 8.5 - 10.1 mg/dL   Erythrocyte sedimentation rate auto     Status: None   Result Value Ref Range    Sed Rate 11 0 - 15 mm/h   CRP inflammation     Status: Abnormal   Result Value Ref Range    CRP Inflammation 82.0 (H) 0.0 - 8.0 mg/L   Symptomatic COVID-19 Virus (Coronavirus) by PCR     Status: None    Specimen: Nasopharyngeal   Result Value Ref Range    COVID-19 Virus PCR to [facility] - Source Nasopharyngeal     COVID-19 Virus PCR to [facility] - Result       Test received-See reflex to IDDL test SARS CoV2 (COVID-19) Virus RT-PCR   UA with Microscopic     Status: Abnormal   Result Value Ref Range    Color Urine Yellow     Appearance Urine Clear     Glucose Urine Negative NEG^Negative mg/dL    Bilirubin Urine Negative NEG^Negative    Ketones Urine Negative NEG^Negative mg/dL    Specific Gravity Urine 1.014 1.003 - 1.035    Blood Urine Negative NEG^Negative    pH Urine 6.0 5.0 - 7.0 pH    Protein Albumin Urine 10 (A) NEG^Negative mg/dL    Urobilinogen mg/dL Normal 0.0 - 2.0 mg/dL    Nitrite Urine Negative NEG^Negative    Leukocyte Esterase Urine Negative NEG^Negative    Source Unspecified Urine     WBC Urine <1 0 - 5 /HPF    RBC Urine <1 0 - 2 /HPF   SARS-CoV-2 COVID-19 Virus (Coronavirus) RT-PCR Nasopharyngeal     Status: Abnormal    Specimen: Nasopharyngeal   Result Value Ref Range    SARS-CoV-2 Virus Specimen Source Nasopharyngeal     SARS-CoV-2 PCR Result POSITIVE (AA)     SARS-CoV-2 PCR Comment       The Simplexa COVID-19 direct PCR assay by [employer] on the [employer] instrument has been   given Emergency Use Authorization (EUA) for the in vitro qualitative detection of RNA from   the SARS-CoV2 virus in nasopharyngeal swabs in viral transport medium from patients with   signs and symptoms of infection who are suspected of COVID-19. Performance is unknown in   asymptomatic  patients.     Urine Culture     Status: None (Preliminary result)    Specimen: Urine Midstream; Unspecified Urine   Result Value Ref Range    Specimen Description Unspecified Urine     Special Requests Specimen received in preservative     Culture Micro PENDING      Medications   0.9% sodium chloride BOLUS (0 mLs Intravenous Stopped 5/26/20 1005)     Followed by   sodium chloride 0.9% infusion ( Intravenous Canceled Entry 5/26/20 1220)   acetaminophen (TYLENOL) tablet 650 mg (650 mg Oral Given 5/26/20 0824)   ketorolac (TORADOL) injection 30 mg (30 mg Intravenous Given 5/26/20 0826)             No results found for any visits on 05/26/20.  Medications   0.9% sodium chloride BOLUS (has no administration in time range)     Followed by   sodium chloride 0.9% infusion (has no administration in time range)   acetaminophen (TYLENOL) tablet 650 mg (has no administration in time range)   ketorolac (TORADOL) injection 30 mg (has no administration in time range)        Assessments & Plan (with Medical Decision Making)   Patient is a very nice 49-year-old male with a history of diabetes on oral metformin and hypertension who presented to the ER due to 1 week of headache unwell.  Patient came to the hospital today because he felt worse and was sent here for evaluation from work.  Patient had a low-grade fever of 100.5 and initial arrival.  Patient has been found to have coronavirus with a elevated CRP that is likely consistent with this diagnosis.  Patient however has no shortness of breath and no concerning cough.  He satting well on room air.  Patient's chest x-ray shows some bibasilar opacities that are likely from COVID.  Due to concern for possible pneumonia we will also start him on a Z-Eusebio.  I had multiple discussions with the patient regarding need to self quarantine at home and reasons to return to the emergency department.  Patient was given a work note to give to his employer so he does not go back to work.      I  have reviewed the nursing notes. I have reviewed the findings, diagnosis, plan and need for follow up with the patient.    New Prescriptions    No medications on file       Final diagnoses:   Fever, unspecified fever cause   Clinical diagnosis of COVID-19       --  Gloria Trivedi MD   Emergency Medicine   [facility] [place], EMERGENCY DEPARTMENT  5/26/2020     Gloria Trivedi MD  05/26/20 1566

## 2020-05-27 LAB
BACTERIA SPEC CULT: NORMAL
Lab: NORMAL
SPECIMEN SOURCE: NORMAL

## 2020-05-28 ENCOUNTER — PATIENT OUTREACH (OUTPATIENT)
Dept: CARE COORDINATION | Facility: CLINIC | Age: 50
End: 2020-05-28

## 2020-05-28 NOTE — LETTER
Vlad Mercado  [address] [place] [address]    June 1, 2020    Dear Vlad,                                                                         You were recently referred to the [facility]'s Clinic Care Coordination service.  This is a service offered through your Primary Care Clinic which can help you access resources and services in regard to your health and well-being goals. The clinic Community Health Worker has placed two calls to you to discuss the nature of this service and to offer enrollment.  If you are interested in learning more about Clinic Care Coordination, please call your Primary Care Clinic's Community Health Worker, Glenny, at [phone].                                                    Sincerely,                                                                              JOHAN Murrieta                                                                                          Clinic Care Coordination                                                  [facility]

## 2020-05-28 NOTE — PROGRESS NOTES
Clinic Care Coordination Contact  [facility]/Voicemail       Clinical Data: Care Coordinator Outreach  Outreach attempted x 1.  Left message on patient's voicemail with call back information and requested return call.  Plan:Care Coordinator will try to reach patient again in 1-2 business days.

## 2020-06-01 NOTE — PROGRESS NOTES
Community Health Worker called and left a message for the patient.  If the patient is returning my call, please transfer the patient to [facility] at ext. [phone].   Patient has been mailed a unreachable letter and was provided with [facility] contact information if they are interested in accessing Clinic Care Coordination.  Order for Care Management has been closed, no further outreach will be done at this time and patient can be re-referred.

## 2020-06-04 ENCOUNTER — APPOINTMENT (OUTPATIENT)
Dept: ULTRASOUND IMAGING | Facility: CLINIC | Age: 50
End: 2020-06-04
Attending: EMERGENCY MEDICINE
Payer: COMMERCIAL

## 2020-06-04 ENCOUNTER — HOSPITAL ENCOUNTER (INPATIENT)
Facility: CLINIC | Age: 50
LOS: 2 days | Discharge: HOME | End: 2020-06-07
Attending: EMERGENCY MEDICINE | Admitting: INTERNAL MEDICINE
Payer: COMMERCIAL

## 2020-06-04 ENCOUNTER — NURSE TRIAGE (OUTPATIENT)
Dept: NURSING | Facility: CLINIC | Age: 50
End: 2020-06-04

## 2020-06-04 DIAGNOSIS — I73.9 ARTERIAL INSUFFICIENCY OF LOWER EXTREMITY (H): ICD-10-CM

## 2020-06-04 DIAGNOSIS — U07.1 2019 NOVEL CORONAVIRUS DISEASE (COVID-19): ICD-10-CM

## 2020-06-04 DIAGNOSIS — I74.3 FEMORAL POPLITEAL ARTERY THROMBUS (H): Primary | ICD-10-CM

## 2020-06-04 LAB
ALBUMIN SERPL-MCNC: 2.5 G/DL (ref 3.4–5)
ALP SERPL-CCNC: 110 U/L (ref 40–150)
ALT SERPL W P-5'-P-CCNC: 274 U/L (ref 0–70)
ANION GAP SERPL CALCULATED.3IONS-SCNC: 7 MMOL/L (ref 3–14)
AST SERPL W P-5'-P-CCNC: 132 U/L (ref 0–45)
BASOPHILS # BLD AUTO: 0 10E9/L (ref 0–0.2)
BASOPHILS NFR BLD AUTO: 0.6 %
BILIRUB SERPL-MCNC: 0.4 MG/DL (ref 0.2–1.3)
BUN SERPL-MCNC: 11 MG/DL (ref 7–30)
CALCIUM SERPL-MCNC: 9.2 MG/DL (ref 8.5–10.1)
CHLORIDE SERPL-SCNC: 105 MMOL/L (ref 94–109)
CK SERPL-CCNC: 361 U/L (ref 30–300)
CO2 SERPL-SCNC: 24 MMOL/L (ref 20–32)
CREAT SERPL-MCNC: 0.64 MG/DL (ref 0.66–1.25)
D DIMER PPP FEU-MCNC: 3.6 UG/ML FEU (ref 0–0.5)
DIFFERENTIAL METHOD BLD: ABNORMAL
EOSINOPHIL # BLD AUTO: 0.2 10E9/L (ref 0–0.7)
EOSINOPHIL NFR BLD AUTO: 3.1 %
ERYTHROCYTE [DISTWIDTH] IN BLOOD BY AUTOMATED COUNT: 12.1 % (ref 10–15)
GFR SERPL CREATININE-BSD FRML MDRD: >90 ML/MIN/{1.73_M2}
GLUCOSE SERPL-MCNC: 133 MG/DL (ref 70–99)
HCT VFR BLD AUTO: 38.9 % (ref 40–53)
HGB BLD-MCNC: 13.7 G/DL (ref 13.3–17.7)
IMM GRANULOCYTES # BLD: 0.1 10E9/L (ref 0–0.4)
IMM GRANULOCYTES NFR BLD: 1.3 %
INR PPP: 1.11 (ref 0.86–1.14)
LACTATE BLD-SCNC: 2.2 MMOL/L (ref 0.7–2)
LYMPHOCYTES # BLD AUTO: 1.2 10E9/L (ref 0.8–5.3)
LYMPHOCYTES NFR BLD AUTO: 17.5 %
MCH RBC QN AUTO: 32.5 PG (ref 26.5–33)
MCHC RBC AUTO-ENTMCNC: 35.2 G/DL (ref 31.5–36.5)
MCV RBC AUTO: 92 FL (ref 78–100)
MONOCYTES # BLD AUTO: 0.5 10E9/L (ref 0–1.3)
MONOCYTES NFR BLD AUTO: 7.6 %
NEUTROPHILS # BLD AUTO: 4.8 10E9/L (ref 1.6–8.3)
NEUTROPHILS NFR BLD AUTO: 69.9 %
NRBC # BLD AUTO: 0 10*3/UL
NRBC BLD AUTO-RTO: 0 /100
PLATELET # BLD AUTO: 484 10E9/L (ref 150–450)
POTASSIUM SERPL-SCNC: 3.6 MMOL/L (ref 3.4–5.3)
PROT SERPL-MCNC: 7.7 G/DL (ref 6.8–8.8)
RBC # BLD AUTO: 4.22 10E12/L (ref 4.4–5.9)
SODIUM SERPL-SCNC: 136 MMOL/L (ref 133–144)
WBC # BLD AUTO: 6.8 10E9/L (ref 4–11)

## 2020-06-04 PROCEDURE — 25000128 H RX IP 250 OP 636: Performed by: EMERGENCY MEDICINE

## 2020-06-04 PROCEDURE — 96360 HYDRATION IV INFUSION INIT: CPT | Performed by: EMERGENCY MEDICINE

## 2020-06-04 PROCEDURE — 93971 EXTREMITY STUDY: CPT | Mod: LT

## 2020-06-04 PROCEDURE — 93010 ELECTROCARDIOGRAM REPORT: CPT | Mod: Z6 | Performed by: EMERGENCY MEDICINE

## 2020-06-04 PROCEDURE — 99285 EMERGENCY DEPT VISIT HI MDM: CPT | Mod: 25 | Performed by: EMERGENCY MEDICINE

## 2020-06-04 PROCEDURE — 85025 COMPLETE CBC W/AUTO DIFF WBC: CPT | Performed by: EMERGENCY MEDICINE

## 2020-06-04 PROCEDURE — 96361 HYDRATE IV INFUSION ADD-ON: CPT | Performed by: EMERGENCY MEDICINE

## 2020-06-04 PROCEDURE — 85610 PROTHROMBIN TIME: CPT | Performed by: EMERGENCY MEDICINE

## 2020-06-04 PROCEDURE — 85379 FIBRIN DEGRADATION QUANT: CPT | Performed by: EMERGENCY MEDICINE

## 2020-06-04 PROCEDURE — 93926 LOWER EXTREMITY STUDY: CPT | Mod: LT

## 2020-06-04 PROCEDURE — 93005 ELECTROCARDIOGRAM TRACING: CPT | Performed by: EMERGENCY MEDICINE

## 2020-06-04 PROCEDURE — 83605 ASSAY OF LACTIC ACID: CPT | Performed by: EMERGENCY MEDICINE

## 2020-06-04 PROCEDURE — 82550 ASSAY OF CK (CPK): CPT | Performed by: EMERGENCY MEDICINE

## 2020-06-04 PROCEDURE — 25800030 ZZH RX IP 258 OP 636: Performed by: EMERGENCY MEDICINE

## 2020-06-04 PROCEDURE — 80053 COMPREHEN METABOLIC PANEL: CPT | Performed by: EMERGENCY MEDICINE

## 2020-06-04 PROCEDURE — 99291 CRITICAL CARE FIRST HOUR: CPT | Mod: 25 | Performed by: EMERGENCY MEDICINE

## 2020-06-04 RX ORDER — HEPARIN SODIUM 10000 [USP'U]/100ML
0-3500 INJECTION, SOLUTION INTRAVENOUS CONTINUOUS
Status: DISCONTINUED | OUTPATIENT
Start: 2020-06-04 | End: 2020-06-07

## 2020-06-04 RX ORDER — SODIUM CHLORIDE 9 MG/ML
1000 INJECTION, SOLUTION INTRAVENOUS CONTINUOUS
Status: DISCONTINUED | OUTPATIENT
Start: 2020-06-04 | End: 2020-06-05

## 2020-06-04 RX ADMIN — SODIUM CHLORIDE 1000 ML: 9 INJECTION, SOLUTION INTRAVENOUS at 21:28

## 2020-06-04 RX ADMIN — SODIUM CHLORIDE 1000 ML: 9 INJECTION, SOLUTION INTRAVENOUS at 17:50

## 2020-06-04 RX ADMIN — HEPARIN SODIUM 1800 UNITS/HR: 10000 INJECTION, SOLUTION INTRAVENOUS at 21:29

## 2020-06-04 RX ADMIN — SODIUM CHLORIDE 1000 ML: 9 INJECTION, SOLUTION INTRAVENOUS at 16:38

## 2020-06-04 NOTE — TELEPHONE ENCOUNTER
"Pt awoke with \"pain in L calf.\"  Calf has remained painful all day.  \"Now pain in L thigh.\"  \"Left foot is ice-cold.\"    Pain is -> \"Worse with standing.\"  \"Hurts with walking.\"    Explained typical DVT symptoms.  Advised immediate ED eval.  Pt agrees -> will go now.    Radha MERRITT Health Nurse Advisor   ____________________________________    Also provided add'l covid precautionary measures due to current pandemic situation as well as pt's positive PCR test result.  COVID 19 Nurse Triage Plan/Patient Instructions    Please be aware that novel coronavirus (COVID-19) may be circulating in the community. If you develop symptoms such as fever, cough, or SOB or if you have concerns about the presence of another infection including coronavirus (COVID-19), please contact your health care provider or visit [email or web address].     Disposition/Instructions    Additional COVID19 information to add for patients.   How can I protect others?  If you have symptoms (fever, cough, body aches or trouble breathing): Stay home and away from others (self-isolate) until:    At least 10 days have passed since your symptoms started. And     You ve had no fever--and no medicine that reduces fever--for 3 full days (72 hours). And      Your other symptoms have resolved (gotten better).     If you don t have symptoms, but a test showed that you have COVID-19 (you tested positive):    Stay home and away from others (self-isolate) until at least 10 days have passed since the date of your first positive COVID-19 test.    During this time:    Stay in your own room, even for meals. Use your own bathroom if you can.     Stay away from others in your home. No hugging, kissing or shaking hands. No visitors.    Don t go to work, school or anywhere else.     Clean  high touch  surfaces often (doorknobs, counters, handles, etc.). Use a household cleaning spray or wipes. You ll find a full list on the EPA website:  " www.epa.gov/pesticide-registration/list-n-disinfectants-use-against-sars-cov-2.    Cover your mouth and nose with a mask, tissue or washcloth to avoid spreading germs.    Wash your hands and face often. Use soap and water.    Caregivers in these groups are at risk for severe illness due to COVID-19:  o People 65 years and older  o People who live in a nursing home or long-term care facility  o People with chronic disease (lung, heart, cancer, diabetes, kidney, liver, immunologic)  o People who have a weakened immune system, including those who:  - Are in cancer treatment  - Take medicine that weakens the immune system, such as corticosteroids  - Had a bone marrow or organ transplant  - Have an immune deficiency  - Have poorly controlled HIV or AIDS  - Are obese (body mass index of 40 or higher)  - Smoke regularly    Caregivers should wear gloves while washing dishes, handling laundry and cleaning bedrooms and bathrooms.    Use caution when washing and drying laundry: Don t shake dirty laundry, and use the warmest water setting that you can.    For more tips, go to www.cdc.gov/coronavirus/2019-ncov/downloads/10Things.pdf.    How can I take care of myself?  1. Get lots of rest. Drink extra fluids (unless a doctor has told you not to).     2. Take Tylenol (acetaminophen) for fever or pain. If you have liver or kidney problems, ask your family doctor if it s okay to take Tylenol.     Adults can take either:     650 mg (two 325 mg pills) every 4 to 6 hours, or     1,000 mg (two 500 mg pills) every 8 hours as needed.     Note: Don t take more than 3,000 mg in one day.   Acetaminophen is found in many medicines (both prescribed and over-the-counter medicines). Read all labels to be sure you don t take too much.     For children, check the Tylenol bottle for the right dose. The dose is based on the child s age or weight.    3. If you have other health problems (like cancer, heart failure, an organ transplant or severe  kidney disease): Call your specialty clinic if you don t feel better in the next 2 days.    4. Know when to call 911: Emergency warning signs include:    Trouble breathing or shortness of breath    Pain or pressure in the chest that doesn t go away    Feeling confused like you haven t felt before, or not being able to wake up    Bluish-colored lips or face    What are the symptoms of COVID-19?     The most common symptoms are cough, fever and trouble breathing.     Less common symptoms include body aches, chills, diarrhea (loose, watery poops), fatigue (feeling very tired), headache, runny nose, sore throat and loss of smell.    COVID-19 can cause severe coughing (bronchitis) and lung infection (pneumonia).    How does it spread?     The virus may spread when a person coughs or sneezes into the air. The virus can travel about 6 feet this way, and it can live on surfaces.      Common  (household disinfectants) will kill the virus.    Who is at risk?  Anyone can catch COVID-19 if they re around someone who has the virus.    How can others protect themselves?     Stay away from people who have COVID-19 (or symptoms of COVID-19).    Wash hands often with soap and water. Or, use hand  with at least 60% alcohol.    Avoid touching the eyes, nose or mouth.     Wear a face mask when you go out in public, when sick or when caring for a sick person.    Where can I get more information?    [facility]: About COVID-19: [email or web address]    CDC: What to Do If You re Sick: www.cdc.gov/coronavirus/2019-ncov/about/steps-when-sick.html    CDC: Ending Home Isolation: www.cdc.gov/coronavirus/2019-ncov/hcp/disposition-in-home-patients.html     CDC: Caring for Someone: www.cdc.gov/coronavirus/2019-ncov/if-you-are-sick/care-for-someone.html     [facility]: Interim Guidance for Hospital Discharge to Home: www.health.[facility].[place]/diseases/coronavirus/hcp/hospdischarge.pdf    [facility]  trials (COVID-19 research studies): clinicalaffairs.[facility]/umn-clinical-trials     Below are the COVID-19 hotlines at the [place] Department of [facility] ([facility]). Interpreters are available.   o For health questions: Call 556-914-1399 or 1-245.370.1583 (7 a.m. to 7 p.m.)  o For questions about schools and childcare: Call 800-622-3024 or 1-971.634.4260 (7 a.m. to 7 p.m.)          Thank you for taking steps to prevent the spread of this virus.  o Limit your contact with others.  o Wear a simple mask to cover your cough.  o Wash your hands well and often.    Resources    [facility]: About COVID-19: [email or web address]    CDC: What to Do If You're Sick: www.cdc.gov/coronavirus/2019-ncov/about/steps-when-sick.html    CDC: Ending Home Isolation: www.cdc.gov/coronavirus/2019-ncov/hcp/disposition-in-home-patients.html     CDC: Caring for Someone: www.cdc.gov/coronavirus/2019-ncov/if-you-are-sick/care-for-someone.html     [facility]: Interim Guidance for Hospital Discharge to Home: www.[facility].[place]./diseases/coronavirus/hcp/hospdischarge.pdf    [facility] clinical trials (COVID-19 research studies): clinicalaffairs.[facility]/n-clinical-trials     Below are the COVID-19 hotlines at the [place] Department [facility] ([facility]). Interpreters are available.   o For health questions: Call 580-916-0413 or 1-651.285.7876 (7 a.m. to 7 p.m.)  o For questions about schools and childcare: Call 609-541-0892 or 1-804.416.1681 (7 a.m. to 7 p.m.)                         Additional Information    Negative: Followed a hip injury    Negative: Followed a knee injury    Negative: Followed an ankle or foot injury    Negative: Back pain radiating (shooting) into leg(s)    Negative: Foot pain is the main symptom    Negative: Ankle pain is the main symptom    Negative: Knee pain is the main symptom    Negative: Leg swelling is the main symptom    Negative: Looks like a broken bone or dislocated joint (e.g., crooked or  deformed)    Negative: Sounds like a life-threatening emergency to the triager    Negative: Chest pain    Negative: Difficulty breathing    Negative: Entire foot is cool or blue in comparison to other side    Negative: Unable to walk    Thigh or calf pain in only one leg and present > 1 hour    Negative: Fever and red area (or area very tender to touch)    Negative: Fever and swollen joint    Protocols used: LEG PAIN-A-[place]

## 2020-06-04 NOTE — ED TRIAGE NOTES
Patient is covid positive as of test last week. Continues to not feel well, shortness of breath, diffuse pain. Felt new pain this morning in left thigh, was told to come in to ED.

## 2020-06-04 NOTE — ED PROVIDER NOTES
[facility] EMERGENCY DEPARTMENT ([facility])     June 4, 2020    History     Chief Complaint   Patient presents with     Leg Pain     left leg sore, increased pain when ambulating since this morning. tested positive for covid last tuesday     HPI  Aaron Mercado is a 49 year old male with a history of type 2 diabetes mellitus, HTN, and HLD who presents to the Emergency Department with left leg pain. Patient tested positive for COVID-19 on 5/26/2020.    Patient states that he noted some left calf pain along with left popliteal fossa pain this morning and felt his left foot which seem to be cold.  He became worried and came to the ER for evaluation.  Patient denies any chest pain shortness of breath or palpitations.  Patient denies any trauma and denies any fever.  Patient states that he has had some generalized malaise myalgias and fatigue from his COVID infection.      PAST MEDICAL HISTORY:   Past Medical History:   Diagnosis Date     Diabetes (H)      Hypertension      Obesity        PAST SURGICAL HISTORY:   Past Surgical History:   Procedure Laterality Date     C ORAL SURGERY PROCEDURE  2000    [place] teeth extraction     LASIK  2003       Past medical history, past surgical history, medications, and allergies were reviewed with the patient. Additional pertinent items: None    FAMILY HISTORY:   Family History   Problem Relation Age of Onset     Diabetes Mother      Family History Negative Father      Family History Negative Sister      Family History Negative Sister      Family History Negative Brother      Family History Negative Brother      Family History Negative Brother      Family History Negative Sister      Family History Negative Son        SOCIAL HISTORY:   Social History     Tobacco Use     Smoking status: Never Smoker     Smokeless tobacco: Never Used     Tobacco comment: Non smoke home   Substance Use Topics     Alcohol use: Yes     Comment: 1-2 nights out during the week     Social history  was reviewed with the patient. Additional pertinent items: None      Patient's Medications   Previous Medications    ACETAMINOPHEN (TYLENOL) 325 MG TABLET    Take 2 tablets (650 mg) by mouth every 4 hours as needed for mild pain    CYCLOBENZAPRINE (FLEXERIL) 5 MG TABLET    Take 1-2 tablets (5-10 mg) by mouth 3 times daily as needed for muscle spasms    LISINOPRIL (ZESTRIL) 5 MG TABLET    TAKE 1 TABLET BY MOUTH ONCE DAILY    METFORMIN (GLUCOPHAGE) 500 MG TABLET    Take 1 tablet (500 mg) by mouth daily (with dinner)    OFLOXACIN (FLOXIN) 0.3 % OTIC SOLUTION    Place 5 drops Into the left ear 2 times daily    TRAMADOL (ULTRAM) 50 MG TABLET    Take 1 tablet (50 mg) by mouth nightly as needed for moderate to severe pain          Allergies   Allergen Reactions     Atorvastatin      myalgia     Bactrim [Sulfamethoxazole W/Trimethoprim]      hives        Review of Systems  A complete review of systems was performed with pertinent positives and negatives noted in the HPI, and all other systems negative.    Physical Exam   BP: 111/74  Temp: 98  F (36.7  C)  Resp: 18  SpO2: 94 %      Physical Exam  Vitals signs and nursing note reviewed.   Constitutional:       Appearance: He is not diaphoretic.   HENT:      Head: Atraumatic.   Eyes:      Extraocular Movements: Extraocular movements intact.      Pupils: Pupils are equal, round, and reactive to light.   Neck:      Musculoskeletal: Neck supple.   Cardiovascular:      Rate and Rhythm: Regular rhythm.   Pulmonary:      Breath sounds: Normal breath sounds.   Abdominal:      Palpations: Abdomen is soft.      Tenderness: There is no abdominal tenderness.   Musculoskeletal:         General: No deformity.      Right lower leg: No edema.      Left lower leg: No edema.   Skin:     Comments: Patient's left foot is definitely cooler to touch than the right foot and leg.    Patient does have sensation intact to his left foot as well as motor strength.  Pulses are diminished   Neurological:       General: No focal deficit present.      Mental Status: He is alert and oriented to person, place, and time.   Psychiatric:         Mood and Affect: Mood normal.         ED Course     Orders Placed This Encounter - initially   Procedures     US Low Extremity Arterial Duplex Left Port     CBC with platelets differential     D dimer quantitative     INR     Comprehensive metabolic panel     CK total     Lactic acid whole blood     CK total     EKG 12 lead     EKG 12-lead, tracing only     Peripheral IV catheter     Oxygen: Nasal cannula, Oxygen mask     Medications - initially   sodium chloride (PF) 0.9% PF flush 3 mL (has no administration in time range)   sodium chloride (PF) 0.9% PF flush 3 mL (has no administration in time range)   0.9% sodium chloride BOLUS (0 mLs Intravenous Stopped 6/4/20 1748)     Followed by   sodium chloride 0.9% infusion (1,000 mLs Intravenous New Bag 6/4/20 1750)            Procedures          EKG reveals a normal sinus rhythm at a rate of 97 with a MN interval of 0.152 and a QRS duration of 0.092.  The patient had a normal axis with no acute ST or T wave changes significant for ischemia.  There was no arrhythmia.  This is read by me personally.    Results for orders placed or performed during the hospital encounter of 06/04/20 (from the past 24 hour(s))   EKG 12 lead   Result Value Ref Range    Interpretation ECG Click View Image link to view waveform and result    CBC with platelets differential   Result Value Ref Range    WBC 6.8 4.0 - 11.0 10e9/L    RBC Count 4.22 (L) 4.4 - 5.9 10e12/L    Hemoglobin 13.7 13.3 - 17.7 g/dL    Hematocrit 38.9 (L) 40.0 - 53.0 %    MCV 92 78 - 100 fl    MCH 32.5 26.5 - 33.0 pg    MCHC 35.2 31.5 - 36.5 g/dL    RDW 12.1 10.0 - 15.0 %    Platelet Count 484 (H) 150 - 450 10e9/L    Diff Method Automated Method     % Neutrophils 69.9 %    % Lymphocytes 17.5 %    % Monocytes 7.6 %    % Eosinophils 3.1 %    % Basophils 0.6 %    % Immature Granulocytes 1.3 %     Nucleated RBCs 0 0 /100    Absolute Neutrophil 4.8 1.6 - 8.3 10e9/L    Absolute Lymphocytes 1.2 0.8 - 5.3 10e9/L    Absolute Monocytes 0.5 0.0 - 1.3 10e9/L    Absolute Eosinophils 0.2 0.0 - 0.7 10e9/L    Absolute Basophils 0.0 0.0 - 0.2 10e9/L    Abs Immature Granulocytes 0.1 0 - 0.4 10e9/L    Absolute Nucleated RBC 0.0    D dimer quantitative   Result Value Ref Range    D Dimer 3.6 (H) 0.0 - 0.50 ug/ml FEU   INR   Result Value Ref Range    INR 1.11 0.86 - 1.14   Comprehensive metabolic panel   Result Value Ref Range    Sodium 136 133 - 144 mmol/L    Potassium 3.6 3.4 - 5.3 mmol/L    Chloride 105 94 - 109 mmol/L    Carbon Dioxide 24 20 - 32 mmol/L    Anion Gap 7 3 - 14 mmol/L    Glucose 133 (H) 70 - 99 mg/dL    Urea Nitrogen 11 7 - 30 mg/dL    Creatinine 0.64 (L) 0.66 - 1.25 mg/dL    GFR Estimate >90 >60 mL/min/[1.73_m2]    GFR Estimate If Black >90 >60 mL/min/[1.73_m2]    Calcium 9.2 8.5 - 10.1 mg/dL    Bilirubin Total 0.4 0.2 - 1.3 mg/dL    Albumin 2.5 (L) 3.4 - 5.0 g/dL    Protein Total 7.7 6.8 - 8.8 g/dL    Alkaline Phosphatase 110 40 - 150 U/L     (H) 0 - 70 U/L     (H) 0 - 45 U/L   CK total   Result Value Ref Range    CK Total 361 (H) 30 - 300 U/L   Lactic acid whole blood   Result Value Ref Range    Lactic Acid 2.2 (H) 0.7 - 2.0 mmol/L     Arterial ultrasound of the left lower extremity is currently pending    Assessments & Plan (with Medical Decision Making)     I have reviewed the nursing notes.    At this time the patient's diagnosis is most likely a arterial thrombus of the left lower extremity.  Patient's distal sensation and strength is intact and therefore the patient will more than likely be treated with heparin and admitted to the medicine service and/or vascular surgery service for further treatment.    The case at this time is being signed out one my partners will follow up with the ultrasound results and arrange for the most appropriate disposition and treatment.    I have reviewed  the findings, diagnosis, and plan with the patient.      Working diagnoses:   Arterial insufficiency of lower extremity (H) - r/o arterial obstruction LLE   2019 novel coronavirus disease (COVID-19)     Jacinto Mccracken MD, MD    6/4/2020   [facility], EMERGENCY DEPARTMENT     Jacinto Mccracken MD  06/04/20 4577

## 2020-06-05 ENCOUNTER — APPOINTMENT (OUTPATIENT)
Dept: CT IMAGING | Facility: CLINIC | Age: 50
End: 2020-06-05
Attending: INTERNAL MEDICINE
Payer: COMMERCIAL

## 2020-06-05 ENCOUNTER — APPOINTMENT (OUTPATIENT)
Dept: INTERVENTIONAL RADIOLOGY/VASCULAR | Facility: CLINIC | Age: 50
End: 2020-06-05
Attending: NURSE PRACTITIONER
Payer: COMMERCIAL

## 2020-06-05 ENCOUNTER — APPOINTMENT (OUTPATIENT)
Dept: CARDIOLOGY | Facility: CLINIC | Age: 50
End: 2020-06-05
Attending: INTERNAL MEDICINE
Payer: COMMERCIAL

## 2020-06-05 ENCOUNTER — ANESTHESIA EVENT (OUTPATIENT)
Dept: SURGERY | Facility: CLINIC | Age: 50
End: 2020-06-05
Payer: COMMERCIAL

## 2020-06-05 ENCOUNTER — ANESTHESIA (OUTPATIENT)
Dept: SURGERY | Facility: CLINIC | Age: 50
End: 2020-06-05
Payer: COMMERCIAL

## 2020-06-05 PROBLEM — I82.90 THROMBUS: Status: ACTIVE | Noted: 2020-06-05

## 2020-06-05 LAB
ABO + RH BLD: NORMAL
ABO + RH BLD: NORMAL
ALBUMIN SERPL-MCNC: 2.2 G/DL (ref 3.4–5)
ALP SERPL-CCNC: 87 U/L (ref 40–150)
ALT SERPL W P-5'-P-CCNC: 209 U/L (ref 0–70)
ANION GAP SERPL CALCULATED.3IONS-SCNC: 7 MMOL/L (ref 3–14)
APTT PPP: 62 SEC (ref 22–37)
AST SERPL W P-5'-P-CCNC: 103 U/L (ref 0–45)
AT III ACT/NOR PPP CHRO: 72 % (ref 85–135)
BASE EXCESS BLDA CALC-SCNC: 0.4 MMOL/L
BILIRUB SERPL-MCNC: 0.3 MG/DL (ref 0.2–1.3)
BLD GP AB SCN SERPL QL: NORMAL
BLOOD BANK CMNT PATIENT-IMP: NORMAL
BUN SERPL-MCNC: 8 MG/DL (ref 7–30)
CA-I BLD-MCNC: 4.9 MG/DL (ref 4.4–5.2)
CALCIUM SERPL-MCNC: 8.5 MG/DL (ref 8.5–10.1)
CHLORIDE SERPL-SCNC: 107 MMOL/L (ref 94–109)
CK SERPL-CCNC: 300 U/L (ref 30–300)
CO2 SERPL-SCNC: 23 MMOL/L (ref 20–32)
CREAT SERPL-MCNC: 0.71 MG/DL (ref 0.66–1.25)
CRP SERPL-MCNC: 23 MG/L (ref 0–8)
ERYTHROCYTE [DISTWIDTH] IN BLOOD BY AUTOMATED COUNT: 12.2 % (ref 10–15)
FERRITIN SERPL-MCNC: 432 NG/ML (ref 26–388)
FIBRINOGEN PPP-MCNC: 585 MG/DL (ref 200–420)
GFR SERPL CREATININE-BSD FRML MDRD: >90 ML/MIN/{1.73_M2}
GLUCOSE BLD-MCNC: 91 MG/DL (ref 70–99)
GLUCOSE BLDC GLUCOMTR-MCNC: 104 MG/DL (ref 70–99)
GLUCOSE SERPL-MCNC: 97 MG/DL (ref 70–99)
HCO3 BLD-SCNC: 26 MMOL/L (ref 21–28)
HCT VFR BLD AUTO: 37.8 % (ref 40–53)
HGB BLD-MCNC: 13 G/DL (ref 13.3–17.7)
HGB BLD-MCNC: 13.2 G/DL (ref 13.3–17.7)
IL6 SERPL-MCNC: 18.89 PG/ML
INR PPP: 1.14 (ref 0.86–1.14)
INTERPRETATION ECG - MUSE: NORMAL
KCT BLD-ACNC: 166 SEC (ref 75–150)
KCT BLD-ACNC: 211 SEC (ref 75–150)
KCT BLD-ACNC: 211 SEC (ref 75–150)
KCT BLD-ACNC: 227 SEC (ref 75–150)
KCT BLD-ACNC: 231 SEC (ref 75–150)
KCT BLD-ACNC: 243 SEC (ref 75–150)
KCT BLD-ACNC: 243 SEC (ref 75–150)
KCT BLD-ACNC: 255 SEC (ref 75–150)
KCT BLD-ACNC: 271 SEC (ref 75–150)
KCT BLD-ACNC: 288 SEC (ref 75–150)
LACTATE BLD-SCNC: 0.4 MMOL/L (ref 0.7–2)
LACTATE BLD-SCNC: 0.7 MMOL/L (ref 0.7–2)
LDH SERPL L TO P-CCNC: 277 U/L (ref 85–227)
LMWH PPP CHRO-ACNC: 0.29 IU/ML
LMWH PPP CHRO-ACNC: 0.43 IU/ML
MCH RBC QN AUTO: 32.3 PG (ref 26.5–33)
MCHC RBC AUTO-ENTMCNC: 34.4 G/DL (ref 31.5–36.5)
MCV RBC AUTO: 94 FL (ref 78–100)
O2/TOTAL GAS SETTING VFR VENT: 57 %
OXYHGB MFR BLD: 93 % (ref 92–100)
PCO2 BLD: 46 MM HG (ref 35–45)
PH BLD: 7.36 PH (ref 7.35–7.45)
PLATELET # BLD AUTO: 450 10E9/L (ref 150–450)
PO2 BLD: 68 MM HG (ref 80–105)
POTASSIUM BLD-SCNC: 3.9 MMOL/L (ref 3.4–5.3)
POTASSIUM SERPL-SCNC: 4.1 MMOL/L (ref 3.4–5.3)
PROT SERPL-MCNC: 6.5 G/DL (ref 6.8–8.8)
RADIOLOGIST FLAGS: NORMAL
RBC # BLD AUTO: 4.03 10E12/L (ref 4.4–5.9)
RETICS # AUTO: 59.2 10E9/L (ref 25–95)
RETICS/RBC NFR AUTO: 1.5 % (ref 0.5–2)
SARS-COV-2 PCR COMMENT: NORMAL
SARS-COV-2 RNA SPEC QL NAA+PROBE: NEGATIVE
SARS-COV-2 RNA SPEC QL NAA+PROBE: NORMAL
SODIUM BLD-SCNC: 140 MMOL/L (ref 133–144)
SODIUM SERPL-SCNC: 137 MMOL/L (ref 133–144)
SPECIMEN EXP DATE BLD: NORMAL
SPECIMEN SOURCE: NORMAL
SPECIMEN SOURCE: NORMAL
TROPONIN I SERPL-MCNC: <0.015 UG/L (ref 0–0.04)
WBC # BLD AUTO: 7.4 10E9/L (ref 4–11)

## 2020-06-05 PROCEDURE — 84132 ASSAY OF SERUM POTASSIUM: CPT

## 2020-06-05 PROCEDURE — 82728 ASSAY OF FERRITIN: CPT | Performed by: INTERNAL MEDICINE

## 2020-06-05 PROCEDURE — B41G1ZZ FLUOROSCOPY OF LEFT LOWER EXTREMITY ARTERIES USING LOW OSMOLAR CONTRAST: ICD-10-PCS | Performed by: RADIOLOGY

## 2020-06-05 PROCEDURE — 25000128 H RX IP 250 OP 636: Performed by: HOSPITALIST

## 2020-06-05 PROCEDURE — 25800030 ZZH RX IP 258 OP 636: Performed by: HOSPITALIST

## 2020-06-05 PROCEDURE — 82803 BLOOD GASES ANY COMBINATION: CPT

## 2020-06-05 PROCEDURE — C1769 GUIDE WIRE: HCPCS | Performed by: RADIOLOGY

## 2020-06-05 PROCEDURE — 85347 COAGULATION TIME ACTIVATED: CPT

## 2020-06-05 PROCEDURE — 25000132 ZZH RX MED GY IP 250 OP 250 PS 637: Performed by: PHYSICIAN ASSISTANT

## 2020-06-05 PROCEDURE — C1887 CATHETER, GUIDING: HCPCS | Performed by: RADIOLOGY

## 2020-06-05 PROCEDURE — 25000125 ZZHC RX 250: Performed by: INTERNAL MEDICINE

## 2020-06-05 PROCEDURE — 99207 ZZC CDG-CHARGE REQUIRED MANUAL ENTRY: CPT | Performed by: INTERNAL MEDICINE

## 2020-06-05 PROCEDURE — 04CN3ZZ EXTIRPATION OF MATTER FROM LEFT POPLITEAL ARTERY, PERCUTANEOUS APPROACH: ICD-10-PCS | Performed by: RADIOLOGY

## 2020-06-05 PROCEDURE — 83520 IMMUNOASSAY QUANT NOS NONAB: CPT | Performed by: INTERNAL MEDICINE

## 2020-06-05 PROCEDURE — 82550 ASSAY OF CK (CPK): CPT | Performed by: INTERNAL MEDICINE

## 2020-06-05 PROCEDURE — 84295 ASSAY OF SERUM SODIUM: CPT

## 2020-06-05 PROCEDURE — 99223 1ST HOSP IP/OBS HIGH 75: CPT | Mod: AI | Performed by: INTERNAL MEDICINE

## 2020-06-05 PROCEDURE — 00000146 ZZHCL STATISTIC GLUCOSE BY METER IP

## 2020-06-05 PROCEDURE — 27210794 ZZH OR GENERAL SUPPLY STERILE: Performed by: RADIOLOGY

## 2020-06-05 PROCEDURE — 37000009 ZZH ANESTHESIA TECHNICAL FEE, EACH ADDTL 15 MIN: Performed by: RADIOLOGY

## 2020-06-05 PROCEDURE — 82330 ASSAY OF CALCIUM: CPT

## 2020-06-05 PROCEDURE — 85300 ANTITHROMBIN III ACTIVITY: CPT | Performed by: INTERNAL MEDICINE

## 2020-06-05 PROCEDURE — 85384 FIBRINOGEN ACTIVITY: CPT | Performed by: INTERNAL MEDICINE

## 2020-06-05 PROCEDURE — 25500064 ZZH RX 255 OP 636: Performed by: INTERNAL MEDICINE

## 2020-06-05 PROCEDURE — 99222 1ST HOSP IP/OBS MODERATE 55: CPT | Mod: GC | Performed by: INTERNAL MEDICINE

## 2020-06-05 PROCEDURE — 27211024 ZZHC OR SUPPLY OTHER OPNP: Performed by: RADIOLOGY

## 2020-06-05 PROCEDURE — 85027 COMPLETE CBC AUTOMATED: CPT | Performed by: INTERNAL MEDICINE

## 2020-06-05 PROCEDURE — 83605 ASSAY OF LACTIC ACID: CPT | Performed by: INTERNAL MEDICINE

## 2020-06-05 PROCEDURE — 36415 COLL VENOUS BLD VENIPUNCTURE: CPT | Performed by: INTERNAL MEDICINE

## 2020-06-05 PROCEDURE — 85610 PROTHROMBIN TIME: CPT | Performed by: INTERNAL MEDICINE

## 2020-06-05 PROCEDURE — 85045 AUTOMATED RETICULOCYTE COUNT: CPT | Performed by: INTERNAL MEDICINE

## 2020-06-05 PROCEDURE — 85520 HEPARIN ASSAY: CPT | Performed by: HOSPITALIST

## 2020-06-05 PROCEDURE — 25000128 H RX IP 250 OP 636: Performed by: INTERNAL MEDICINE

## 2020-06-05 PROCEDURE — 83615 LACTATE (LD) (LDH) ENZYME: CPT | Performed by: INTERNAL MEDICINE

## 2020-06-05 PROCEDURE — 40000264 ECHOCARDIOGRAM COMPLETE

## 2020-06-05 PROCEDURE — U0003 INFECTIOUS AGENT DETECTION BY NUCLEIC ACID (DNA OR RNA); SEVERE ACUTE RESPIRATORY SYNDROME CORONAVIRUS 2 (SARS-COV-2) (CORONAVIRUS DISEASE [COVID-19]), AMPLIFIED PROBE TECHNIQUE, MAKING USE OF HIGH THROUGHPUT TECHNOLOGIES AS DESCRIBED BY CMS-2020-01-R: HCPCS | Performed by: HOSPITALIST

## 2020-06-05 PROCEDURE — 04CU3ZZ EXTIRPATION OF MATTER FROM LEFT PERONEAL ARTERY, PERCUTANEOUS APPROACH: ICD-10-PCS | Performed by: RADIOLOGY

## 2020-06-05 PROCEDURE — C1894 INTRO/SHEATH, NON-LASER: HCPCS | Performed by: RADIOLOGY

## 2020-06-05 PROCEDURE — 86900 BLOOD TYPING SEROLOGIC ABO: CPT | Performed by: INTERNAL MEDICINE

## 2020-06-05 PROCEDURE — 25000566 ZZH SEVOFLURANE, EA 15 MIN: Performed by: RADIOLOGY

## 2020-06-05 PROCEDURE — 25500064 ZZH RX 255 OP 636: Performed by: RADIOLOGY

## 2020-06-05 PROCEDURE — 40000275 ZZH STATISTIC RCP TIME EA 10 MIN

## 2020-06-05 PROCEDURE — 25000132 ZZH RX MED GY IP 250 OP 250 PS 637: Performed by: INTERNAL MEDICINE

## 2020-06-05 PROCEDURE — 12000001 ZZH R&B MED SURG/OB UMMC

## 2020-06-05 PROCEDURE — 25800030 ZZH RX IP 258 OP 636: Performed by: NURSE ANESTHETIST, CERTIFIED REGISTERED

## 2020-06-05 PROCEDURE — 84484 ASSAY OF TROPONIN QUANT: CPT | Performed by: INTERNAL MEDICINE

## 2020-06-05 PROCEDURE — 37000008 ZZH ANESTHESIA TECHNICAL FEE, 1ST 30 MIN: Performed by: RADIOLOGY

## 2020-06-05 PROCEDURE — 85730 THROMBOPLASTIN TIME PARTIAL: CPT | Performed by: INTERNAL MEDICINE

## 2020-06-05 PROCEDURE — 25000125 ZZHC RX 250: Performed by: NURSE ANESTHETIST, CERTIFIED REGISTERED

## 2020-06-05 PROCEDURE — 75710 ARTERY X-RAYS ARM/LEG: CPT | Mod: LT

## 2020-06-05 PROCEDURE — 75635 CT ANGIO ABDOMINAL ARTERIES: CPT

## 2020-06-05 PROCEDURE — 27210808 ZZH SHEATH CR7

## 2020-06-05 PROCEDURE — 86901 BLOOD TYPING SEROLOGIC RH(D): CPT | Performed by: INTERNAL MEDICINE

## 2020-06-05 PROCEDURE — C1757 CATH, THROMBECTOMY/EMBOLECT: HCPCS

## 2020-06-05 PROCEDURE — 85520 HEPARIN ASSAY: CPT | Performed by: INTERNAL MEDICINE

## 2020-06-05 PROCEDURE — 04CS3ZZ EXTIRPATION OF MATTER FROM LEFT POSTERIOR TIBIAL ARTERY, PERCUTANEOUS APPROACH: ICD-10-PCS | Performed by: RADIOLOGY

## 2020-06-05 PROCEDURE — 99207 ZZC APP CREDIT; MD BILLING SHARED VISIT: CPT | Performed by: HOSPITALIST

## 2020-06-05 PROCEDURE — 86140 C-REACTIVE PROTEIN: CPT | Performed by: INTERNAL MEDICINE

## 2020-06-05 PROCEDURE — 82810 BLOOD GASES O2 SAT ONLY: CPT

## 2020-06-05 PROCEDURE — 36000062 ZZH SURGERY LEVEL 4 1ST 30 MIN - UMMC: Performed by: RADIOLOGY

## 2020-06-05 PROCEDURE — 25800030 ZZH RX IP 258 OP 636: Performed by: INTERNAL MEDICINE

## 2020-06-05 PROCEDURE — 25000128 H RX IP 250 OP 636: Performed by: NURSE ANESTHETIST, CERTIFIED REGISTERED

## 2020-06-05 PROCEDURE — 40000003 ZZH STATISTIC IR STAFF TIME IN THE OR

## 2020-06-05 PROCEDURE — 93306 TTE W/DOPPLER COMPLETE: CPT | Mod: 26 | Performed by: INTERNAL MEDICINE

## 2020-06-05 PROCEDURE — 86850 RBC ANTIBODY SCREEN: CPT | Performed by: INTERNAL MEDICINE

## 2020-06-05 PROCEDURE — 36415 COLL VENOUS BLD VENIPUNCTURE: CPT | Performed by: HOSPITALIST

## 2020-06-05 PROCEDURE — 83605 ASSAY OF LACTIC ACID: CPT

## 2020-06-05 PROCEDURE — 82947 ASSAY GLUCOSE BLOOD QUANT: CPT

## 2020-06-05 PROCEDURE — 37184 PRIM ART M-THRMBC 1ST VSL: CPT

## 2020-06-05 PROCEDURE — 36000064 ZZH SURGERY LEVEL 4 EA 15 ADDTL MIN - UMMC: Performed by: RADIOLOGY

## 2020-06-05 PROCEDURE — 40000014 ZZH STATISTIC ARTERIAL MONITORING DAILY

## 2020-06-05 PROCEDURE — 80053 COMPREHEN METABOLIC PANEL: CPT | Performed by: INTERNAL MEDICINE

## 2020-06-05 PROCEDURE — C1760 CLOSURE DEV, VASC: HCPCS | Performed by: RADIOLOGY

## 2020-06-05 RX ORDER — DEXTROSE MONOHYDRATE 25 G/50ML
25-50 INJECTION, SOLUTION INTRAVENOUS
Status: DISCONTINUED | OUTPATIENT
Start: 2020-06-05 | End: 2020-06-07 | Stop reason: HOSPADM

## 2020-06-05 RX ORDER — IODIXANOL 320 MG/ML
INJECTION, SOLUTION INTRAVASCULAR PRN
Status: DISCONTINUED | OUTPATIENT
Start: 2020-06-05 | End: 2020-06-05 | Stop reason: HOSPADM

## 2020-06-05 RX ORDER — LANOLIN ALCOHOL/MO/W.PET/CERES
3 CREAM (GRAM) TOPICAL
Status: DISCONTINUED | OUTPATIENT
Start: 2020-06-05 | End: 2020-06-07 | Stop reason: HOSPADM

## 2020-06-05 RX ORDER — ACETAMINOPHEN 325 MG/1
650 TABLET ORAL EVERY 4 HOURS PRN
Status: DISCONTINUED | OUTPATIENT
Start: 2020-06-05 | End: 2020-06-07 | Stop reason: HOSPADM

## 2020-06-05 RX ORDER — LISINOPRIL 5 MG/1
5 TABLET ORAL DAILY
Status: DISCONTINUED | OUTPATIENT
Start: 2020-06-05 | End: 2020-06-05

## 2020-06-05 RX ORDER — ONDANSETRON 2 MG/ML
4 INJECTION INTRAMUSCULAR; INTRAVENOUS EVERY 6 HOURS PRN
Status: DISCONTINUED | OUTPATIENT
Start: 2020-06-05 | End: 2020-06-07 | Stop reason: HOSPADM

## 2020-06-05 RX ORDER — HEPARIN SODIUM 1000 [USP'U]/ML
INJECTION, SOLUTION INTRAVENOUS; SUBCUTANEOUS PRN
Status: DISCONTINUED | OUTPATIENT
Start: 2020-06-05 | End: 2020-06-05

## 2020-06-05 RX ORDER — HYDROMORPHONE HYDROCHLORIDE 1 MG/ML
.3-.5 INJECTION, SOLUTION INTRAMUSCULAR; INTRAVENOUS; SUBCUTANEOUS EVERY 5 MIN PRN
Status: CANCELLED | OUTPATIENT
Start: 2020-06-05

## 2020-06-05 RX ORDER — FENTANYL CITRATE 50 UG/ML
INJECTION, SOLUTION INTRAMUSCULAR; INTRAVENOUS PRN
Status: DISCONTINUED | OUTPATIENT
Start: 2020-06-05 | End: 2020-06-05

## 2020-06-05 RX ORDER — NALOXONE HYDROCHLORIDE 0.4 MG/ML
.1-.4 INJECTION, SOLUTION INTRAMUSCULAR; INTRAVENOUS; SUBCUTANEOUS
Status: DISCONTINUED | OUTPATIENT
Start: 2020-06-05 | End: 2020-06-07 | Stop reason: HOSPADM

## 2020-06-05 RX ORDER — CYCLOBENZAPRINE HCL 5 MG
5-10 TABLET ORAL 3 TIMES DAILY PRN
Status: DISCONTINUED | OUTPATIENT
Start: 2020-06-05 | End: 2020-06-07 | Stop reason: HOSPADM

## 2020-06-05 RX ORDER — CEFAZOLIN SODIUM IN 0.9 % NACL 3 G/100 ML
3 INTRAVENOUS SOLUTION, PIGGYBACK (ML) INTRAVENOUS
Status: DISCONTINUED | OUTPATIENT
Start: 2020-06-05 | End: 2020-06-05 | Stop reason: HOSPADM

## 2020-06-05 RX ORDER — SODIUM CHLORIDE, SODIUM LACTATE, POTASSIUM CHLORIDE, CALCIUM CHLORIDE 600; 310; 30; 20 MG/100ML; MG/100ML; MG/100ML; MG/100ML
INJECTION, SOLUTION INTRAVENOUS CONTINUOUS
Status: CANCELLED | OUTPATIENT
Start: 2020-06-05

## 2020-06-05 RX ORDER — SODIUM CHLORIDE, SODIUM LACTATE, POTASSIUM CHLORIDE, CALCIUM CHLORIDE 600; 310; 30; 20 MG/100ML; MG/100ML; MG/100ML; MG/100ML
INJECTION, SOLUTION INTRAVENOUS CONTINUOUS PRN
Status: DISCONTINUED | OUTPATIENT
Start: 2020-06-05 | End: 2020-06-05

## 2020-06-05 RX ORDER — ONDANSETRON 4 MG/1
4 TABLET, ORALLY DISINTEGRATING ORAL EVERY 30 MIN PRN
Status: CANCELLED | OUTPATIENT
Start: 2020-06-05

## 2020-06-05 RX ORDER — LIDOCAINE 40 MG/G
CREAM TOPICAL
Status: DISCONTINUED | OUTPATIENT
Start: 2020-06-05 | End: 2020-06-07 | Stop reason: HOSPADM

## 2020-06-05 RX ORDER — ACETAMINOPHEN 500 MG
500 TABLET ORAL EVERY 6 HOURS PRN
Status: DISCONTINUED | OUTPATIENT
Start: 2020-06-05 | End: 2020-06-07 | Stop reason: HOSPADM

## 2020-06-05 RX ORDER — HEPARIN SODIUM 10000 [USP'U]/100ML
500 INJECTION, SOLUTION INTRAVENOUS CONTINUOUS
Status: CANCELLED | OUTPATIENT
Start: 2020-06-05

## 2020-06-05 RX ORDER — LIDOCAINE 4 G/G
1 PATCH TOPICAL
Status: DISCONTINUED | OUTPATIENT
Start: 2020-06-05 | End: 2020-06-07 | Stop reason: HOSPADM

## 2020-06-05 RX ORDER — FENTANYL CITRATE 50 UG/ML
25-50 INJECTION, SOLUTION INTRAMUSCULAR; INTRAVENOUS
Status: CANCELLED | OUTPATIENT
Start: 2020-06-05

## 2020-06-05 RX ORDER — SODIUM CHLORIDE 9 MG/ML
1000 INJECTION, SOLUTION INTRAVENOUS CONTINUOUS
Status: DISCONTINUED | OUTPATIENT
Start: 2020-06-05 | End: 2020-06-06

## 2020-06-05 RX ORDER — LIDOCAINE HYDROCHLORIDE 20 MG/ML
INJECTION, SOLUTION INFILTRATION; PERINEURAL PRN
Status: DISCONTINUED | OUTPATIENT
Start: 2020-06-05 | End: 2020-06-05

## 2020-06-05 RX ORDER — IOPAMIDOL 755 MG/ML
150 INJECTION, SOLUTION INTRAVASCULAR ONCE
Status: COMPLETED | OUTPATIENT
Start: 2020-06-05 | End: 2020-06-05

## 2020-06-05 RX ORDER — BENZONATATE 100 MG/1
100 CAPSULE ORAL 3 TIMES DAILY PRN
Status: DISCONTINUED | OUTPATIENT
Start: 2020-06-05 | End: 2020-06-07 | Stop reason: HOSPADM

## 2020-06-05 RX ORDER — ONDANSETRON 2 MG/ML
4 INJECTION INTRAMUSCULAR; INTRAVENOUS EVERY 30 MIN PRN
Status: CANCELLED | OUTPATIENT
Start: 2020-06-05

## 2020-06-05 RX ORDER — NALOXONE HYDROCHLORIDE 0.4 MG/ML
.1-.4 INJECTION, SOLUTION INTRAMUSCULAR; INTRAVENOUS; SUBCUTANEOUS
Status: ACTIVE | OUTPATIENT
Start: 2020-06-05 | End: 2020-06-06

## 2020-06-05 RX ORDER — PROPOFOL 10 MG/ML
INJECTION, EMULSION INTRAVENOUS PRN
Status: DISCONTINUED | OUTPATIENT
Start: 2020-06-05 | End: 2020-06-05

## 2020-06-05 RX ORDER — ESMOLOL HYDROCHLORIDE 10 MG/ML
INJECTION INTRAVENOUS PRN
Status: DISCONTINUED | OUTPATIENT
Start: 2020-06-05 | End: 2020-06-05

## 2020-06-05 RX ORDER — SODIUM CHLORIDE 9 MG/ML
INJECTION, SOLUTION INTRAVENOUS CONTINUOUS PRN
Status: DISCONTINUED | OUTPATIENT
Start: 2020-06-05 | End: 2020-06-05

## 2020-06-05 RX ORDER — NICOTINE POLACRILEX 4 MG
15-30 LOZENGE BUCCAL
Status: DISCONTINUED | OUTPATIENT
Start: 2020-06-05 | End: 2020-06-07 | Stop reason: HOSPADM

## 2020-06-05 RX ORDER — ONDANSETRON 4 MG/1
4 TABLET, ORALLY DISINTEGRATING ORAL EVERY 6 HOURS PRN
Status: DISCONTINUED | OUTPATIENT
Start: 2020-06-05 | End: 2020-06-07 | Stop reason: HOSPADM

## 2020-06-05 RX ADMIN — ROCURONIUM BROMIDE 20 MG: 10 INJECTION INTRAVENOUS at 17:40

## 2020-06-05 RX ADMIN — ONDANSETRON 4 MG: 2 INJECTION INTRAMUSCULAR; INTRAVENOUS at 19:06

## 2020-06-05 RX ADMIN — SUGAMMADEX 200 MG: 100 INJECTION, SOLUTION INTRAVENOUS at 19:43

## 2020-06-05 RX ADMIN — LIDOCAINE 1 PATCH: 560 PATCH PERCUTANEOUS; TOPICAL; TRANSDERMAL at 22:17

## 2020-06-05 RX ADMIN — FENTANYL CITRATE 100 MCG: 50 INJECTION, SOLUTION INTRAMUSCULAR; INTRAVENOUS at 16:20

## 2020-06-05 RX ADMIN — HEPARIN SODIUM 3000 UNITS: 1000 INJECTION INTRAVENOUS; SUBCUTANEOUS at 17:44

## 2020-06-05 RX ADMIN — PHENYLEPHRINE HYDROCHLORIDE 0.3 MCG/KG/MIN: 10 INJECTION INTRAVENOUS at 17:41

## 2020-06-05 RX ADMIN — BENZONATATE 100 MG: 100 CAPSULE ORAL at 02:39

## 2020-06-05 RX ADMIN — Medication 140 MG: at 16:20

## 2020-06-05 RX ADMIN — PROPOFOL 180 MG: 10 INJECTION, EMULSION INTRAVENOUS at 16:20

## 2020-06-05 RX ADMIN — FENTANYL CITRATE 50 MCG: 50 INJECTION, SOLUTION INTRAMUSCULAR; INTRAVENOUS at 18:27

## 2020-06-05 RX ADMIN — ESMOLOL HYDROCHLORIDE 30 MG: 10 INJECTION, SOLUTION INTRAVENOUS at 19:59

## 2020-06-05 RX ADMIN — SODIUM CHLORIDE 1000 ML: 900 INJECTION INTRAVENOUS at 01:49

## 2020-06-05 RX ADMIN — HEPARIN SODIUM 1950 UNITS/HR: 10000 INJECTION, SOLUTION INTRAVENOUS at 21:05

## 2020-06-05 RX ADMIN — SODIUM CHLORIDE, POTASSIUM CHLORIDE, SODIUM LACTATE AND CALCIUM CHLORIDE: 600; 310; 30; 20 INJECTION, SOLUTION INTRAVENOUS at 19:26

## 2020-06-05 RX ADMIN — LIDOCAINE HYDROCHLORIDE 100 MG: 20 INJECTION, SOLUTION INFILTRATION; PERINEURAL at 16:20

## 2020-06-05 RX ADMIN — FENTANYL CITRATE 50 MCG: 50 INJECTION, SOLUTION INTRAMUSCULAR; INTRAVENOUS at 19:30

## 2020-06-05 RX ADMIN — IOPAMIDOL 150 ML: 755 INJECTION, SOLUTION INTRAVENOUS at 04:46

## 2020-06-05 RX ADMIN — ROCURONIUM BROMIDE 10 MG: 10 INJECTION INTRAVENOUS at 18:27

## 2020-06-05 RX ADMIN — ROCURONIUM BROMIDE 10 MG: 10 INJECTION INTRAVENOUS at 19:10

## 2020-06-05 RX ADMIN — PHENYLEPHRINE HYDROCHLORIDE 100 MCG: 10 INJECTION INTRAVENOUS at 16:59

## 2020-06-05 RX ADMIN — MELATONIN TAB 3 MG 3 MG: 3 TAB at 02:39

## 2020-06-05 RX ADMIN — HEPARIN SODIUM 8500 UNITS: 1000 INJECTION INTRAVENOUS; SUBCUTANEOUS at 17:11

## 2020-06-05 RX ADMIN — PERFLUTREN 5 ML: 6.52 INJECTION, SUSPENSION INTRAVENOUS at 11:00

## 2020-06-05 RX ADMIN — HEPARIN SODIUM 2000 UNITS: 1000 INJECTION INTRAVENOUS; SUBCUTANEOUS at 17:56

## 2020-06-05 RX ADMIN — MIDAZOLAM 2 MG: 1 INJECTION INTRAMUSCULAR; INTRAVENOUS at 16:12

## 2020-06-05 RX ADMIN — SODIUM CHLORIDE, POTASSIUM CHLORIDE, SODIUM LACTATE AND CALCIUM CHLORIDE: 600; 310; 30; 20 INJECTION, SOLUTION INTRAVENOUS at 16:43

## 2020-06-05 RX ADMIN — ROCURONIUM BROMIDE 60 MG: 10 INJECTION INTRAVENOUS at 16:24

## 2020-06-05 RX ADMIN — HEPARIN SODIUM 2000 UNITS: 1000 INJECTION INTRAVENOUS; SUBCUTANEOUS at 18:53

## 2020-06-05 RX ADMIN — PHENYLEPHRINE HYDROCHLORIDE 100 MCG: 10 INJECTION INTRAVENOUS at 17:12

## 2020-06-05 RX ADMIN — ROCURONIUM BROMIDE 10 MG: 10 INJECTION INTRAVENOUS at 18:05

## 2020-06-05 RX ADMIN — ROCURONIUM BROMIDE 20 MG: 10 INJECTION INTRAVENOUS at 17:04

## 2020-06-05 RX ADMIN — PHENYLEPHRINE HYDROCHLORIDE 0.3 MCG/KG/MIN: 10 INJECTION INTRAVENOUS at 17:10

## 2020-06-05 RX ADMIN — SODIUM CHLORIDE, PRESERVATIVE FREE 85 ML: 5 INJECTION INTRAVENOUS at 04:46

## 2020-06-05 RX ADMIN — SODIUM CHLORIDE: 9 INJECTION, SOLUTION INTRAVENOUS at 16:00

## 2020-06-05 RX ADMIN — PHENYLEPHRINE HYDROCHLORIDE 100 MCG: 10 INJECTION INTRAVENOUS at 16:44

## 2020-06-05 RX ADMIN — HEPARIN SODIUM 2000 UNITS: 1000 INJECTION INTRAVENOUS; SUBCUTANEOUS at 17:22

## 2020-06-05 RX ADMIN — HEPARIN SODIUM 1500 UNITS: 1000 INJECTION INTRAVENOUS; SUBCUTANEOUS at 17:33

## 2020-06-05 RX ADMIN — FENTANYL CITRATE 50 MCG: 50 INJECTION, SOLUTION INTRAMUSCULAR; INTRAVENOUS at 19:46

## 2020-06-05 RX ADMIN — FENTANYL CITRATE 100 MCG: 50 INJECTION, SOLUTION INTRAMUSCULAR; INTRAVENOUS at 16:34

## 2020-06-05 RX ADMIN — FENTANYL CITRATE 50 MCG: 50 INJECTION, SOLUTION INTRAMUSCULAR; INTRAVENOUS at 19:47

## 2020-06-05 ASSESSMENT — ACTIVITIES OF DAILY LIVING (ADL)
ADLS_ACUITY_SCORE: 12
ADLS_ACUITY_SCORE: 11
ADLS_ACUITY_SCORE: 11
ADLS_ACUITY_SCORE: 12

## 2020-06-05 ASSESSMENT — MIFFLIN-ST. JEOR: SCORE: 2104.5

## 2020-06-05 NOTE — ED NOTES
Patient signed out to me to follow-up on arterial ultrasound for concern for clot in the left lower extremity.  Reviewed his previous labs that have resulted.  Plan was for likely heparinization for this and admission.  On my evaluation he does have diminished palpable pulses, sluggish capillary refill in the toes pulses left over right with a cool foot.  Unable to Doppler ultrasound PT or DP compared to right.  However patient does still have sensation intact and has full range of motion of his left lower extremity without pain.  Prelim ultrasound report shows evidence of monophasic flow distally and evidence of clot, I discussed with vascular surgery attending the clinical picture and the prelim report.  He would like heparin started and admission to medicine but also to obtain a DVT study to rule this out given the covid positive presentation as another cause as well but treatment would be the same.  He requests patient be transferred to [facility] for the surgery resident to evaluate as well as a consult, but does not think given clinical presentation that acute surgical intervention is necessary.  Discussed with the hospitalist who agreed for admission.  Surgery resident was notified of the patient's pending transfer    Critical Care Addendum    My initial assessment, based on my review of vital signs, focused history, physical exam and discussion with vascular surgery, established that Aaron Mercado has and artierial insufficiency in LLE, which requires immediate intervention, and therefore He is critically ill for ischemic limb    After the initial assessment, the care team initiated multiple lab tests and initiated medication therapy with heparin to provide stabilization care. Due to the critical nature of this patient, I reassessed physical exam multiple times prior to He disposition.     Time also spent performing documentation, reviewing test results, discussion with consultants and coordination of  care.     Critical care time (excluding teaching time and procedures): 40 minutes.      Lui Sandoval MD  06/04/20 [id number]

## 2020-06-05 NOTE — ANESTHESIA PREPROCEDURE EVALUATION
"Anesthesia Pre-Procedure Evaluation    Patient: Aaron Mercado   MRN:     [id number] Gender:   male   Age:    49 year old :      1970        Preoperative Diagnosis: Ischemia of left lower extremity [I99.8]   Procedure(s):  EMBOLIZATION LOWER EXTREMITY     LABS:  CBC:   Lab Results   Component Value Date    WBC 7.4 2020    WBC 6.8 2020    HGB 13.0 (L) 2020    HGB 13.7 2020    HCT 37.8 (L) 2020    HCT 38.9 (L) 2020     2020     (H) 2020     BMP:   Lab Results   Component Value Date     2020     2020    POTASSIUM 4.1 2020    POTASSIUM 3.6 2020    CHLORIDE 107 2020    CHLORIDE 105 2020    CO2 23 2020    CO2 24 2020    BUN 8 2020    BUN 11 2020    CR 0.71 2020    CR 0.64 (L) 2020    GLC 97 2020     (H) 2020     COAGS:   Lab Results   Component Value Date    PTT 62 (H) 2020    INR 1.14 2020    FIBR 585 (H) 2020     POC:   Lab Results   Component Value Date     (H) 2018     OTHER:   Lab Results   Component Value Date    LACT 0.7 2020    A1C 5.9 (H) 10/29/2019    DIONY 8.5 2020    ALBUMIN 2.2 (L) 2020    PROTTOTAL 6.5 (L) 2020     (H) 2020     (H) 2020    ALKPHOS 87 2020    BILITOTAL 0.3 2020    TSH 1.14 2018    CRP 23.0 (H) 2020    SED 11 2020        Preop Vitals    BP Readings from Last 3 Encounters:   20 129/87   20 126/73   10/29/19 (!) 146/84    Pulse Readings from Last 3 Encounters:   20 94   20 93   10/29/19 96      Resp Readings from Last 3 Encounters:   20 18   20 14   10/29/19 18    SpO2 Readings from Last 3 Encounters:   20 93%   20 100%   10/29/19 97%      Temp Readings from Last 1 Encounters:   20 35.8  C (96.5  F)    Ht Readings from Last 1 Encounters:   20 1.727 m (5' 8\") " "     Wt Readings from Last 1 Encounters:   06/05/20 125.5 kg (276 lb 10.8 oz)    Estimated body mass index is 42.07 kg/m  as calculated from the following:    Height as of this encounter: 1.727 m (5' 8\").    Weight as of this encounter: 125.5 kg (276 lb 10.8 oz).     LDA:  Peripheral IV 06/04/20 Left Upper forearm (Active)   Site Assessment WDL 06/05/20 1000   Line Status Infusing 06/05/20 1000   Phlebitis Scale 0-->no symptoms 06/05/20 1000   Infiltration Scale 0 06/05/20 1000   Infiltration Site Treatment Method  None 06/05/20 1000   Extravasation? No 06/05/20 1000   Number of days: 1       ETT (Active)   Number of days: 0        Past Medical History:   Diagnosis Date     Diabetes (H)      Hypertension      Obesity       Past Surgical History:   Procedure Laterality Date     C ORAL SURGERY PROCEDURE  2000    [place] teeth extraction     LASIK 2003      Allergies   Allergen Reactions     Atorvastatin      myalgia     Bactrim [Sulfamethoxazole W/Trimethoprim]      hives        Anesthesia Evaluation     . Pt has had prior anesthetic.     No history of anesthetic complications          ROS/MED HX    ENT/Pulmonary: Comment: Cough - COVID + 5/26, negative 6/5      Neurologic:       Cardiovascular:     (+) hypertension-Peripheral Vascular Disease---. : . . . :. .       METS/Exercise Tolerance:     Hematologic:         Musculoskeletal:         GI/Hepatic:         Renal/Genitourinary:         Endo:     (+) type II DM Obesity, .      Psychiatric:         Infectious Disease:         Malignancy:         Other:                         PHYSICAL EXAM:   Mental Status/Neuro: A/A/O   Airway: Facies: Feasible  Mallampati: Not Assessed  Mouth/Opening: Not Assessed  TM distance: Not Assessed  Neck ROM: Not Assessed   Respiratory: Auscultation: CTAB     Resp. Rate: Normal     Resp. Effort: Normal      CV: Rhythm: Regular  Rate: Age appropriate  Heart: Normal Sounds  Edema: None   Comments:      Dental: Normal Dentition        "         Assessment:   ASA SCORE: 3 emergent   H&P: History and physical reviewed and following examination; no interval change.   Smoking Status:  Non-Smoker/Unknown   NPO Status: NPO Appropriate     Plan:   Anes. Type:  General   Pre-Medication: None   Induction:  IV (RSI)   Airway: ETT; Oral   Access/Monitoring: PIV; A-Line   Maintenance: Balanced     Postop Plan:   Postop Pain: Opioids  Postop Sedation/Airway: Not planned  Disposition: Inpatient/Admit     PONV Management:   Adult Risk Factors:, Non-Smoker, Postop Opioids   Prevention: Ondansetron, Dexamethasone     CONSENT: Deferred (Emergency)   Plan and risks discussed with: Not Applicable   Blood Products: N/a                   Pranav Nguyễn DO

## 2020-06-05 NOTE — PROGRESS NOTES
"Vascular Surgery Progress Note:    Subjective:  Pain significantly improved with heparin gtt.  Currently with good motor function of left leg with good 5/5 dorsi-flexion and plantar flexion.  Sensation intact to light touch as well.    /87   Pulse 94   Temp 97.2  F (36.2  C) (Oral)   Resp 18   Ht 1.727 m (5' 8\")   Wt 125.5 kg (276 lb 10.8 oz)   SpO2 96%   BMI 42.07 kg/m      Gen: NAD, fatigued  Heart: reg rate to 90s  Lungs: poor inspiratory effort but bilateral breath sounds  Abd: soft, obese, no abdominal TTP  Extremities: good 5/5 dorsi-flexion and plantar-flexion to bilateral LE  Sensation: sensation intact to light touch of bilateral feet  Signals appreciated with PT and DP on left LE, PT stronger signal than DP  Palpable right leg DP and palpable bilateral femoral artery pulses  Skin over foot intact and not ulcers    CTA shows both left profunda and left behind knee popliteal artery has thrombus with some distal reconstitution of the peroneal and PT    Assessment:  Concern for acute thrombotic event to left lower extremity, etiology no entirely clear, could be cardio-embolic, could be from a hypercoagulable state from COVID19    -Echo to look for cardio-embolic source, no obvious LV thrombus  -Hep gtt running, will need anticoagulation for at least 3-6 months, longer pending etiology  -Consult to IR for evaluation for catheter directed therapy with suction thrombectomy and likely lytic therapy  -IR team will be available today to perform this procedure, appreciate IR assistance  -Vascular surgery will continue to be available for questions or concerns    Luke Pesonen  Vascular Surgery Fellow  "

## 2020-06-05 NOTE — CONSULTS
HEMATOLOGY INITIAL CONSULT NOTE  06/05/20  2:06 PM    Reason for consult:  Acute LLE arterial thrombus in the setting of COVID-19 infection    History of present illness:  Mr. Mercado is a 49-year-old man who was diagnosed with COVID-19 on 5/26.  He was diagnosed in the ED and then sent home, as he did not need oxygen treatment and was not unstable.  He came back in to the ED yesterday because his left calf became more painful over a day or so.  The pain reached up into the back of his left thigh and he also felt like that leg was cold.  Imaging showed evidence of clot, so he was started on heparin infusion and transferred over.  He reports that he is not in so much pain anymore.  Still feels like the leg is cold.  He is not short of breath.  Has not had any bleeding on heparin drip.    A complete review of systems was performed and was negative with the exception of pertinent positives noted above.    Past medical history:  Past Medical History:   Diagnosis Date     Diabetes (H)      Hypertension      Obesity         Past surgical history:  Past Surgical History:   Procedure Laterality Date     C ORAL SURGERY PROCEDURE  2000    [place] teeth extraction     LASIK  2003        Family history:  Mother with diabetes.     Social history:  Social History     Tobacco Use     Smoking status: Never Smoker     Smokeless tobacco: Never Used     Tobacco comment: Non smoke home   Substance Use Topics     Alcohol use: Yes     Comment: 1-2 nights out during the week     Drug use: No        Medications:    Current Facility-Administered Medications:      acetaminophen (TYLENOL) tablet 650 mg, 650 mg, Oral, Q4H PRN, Heaven Ryan MD     benzonatate (TESSALON) capsule 100 mg, 100 mg, Oral, TID PRN, Heaven Ryan MD, 100 mg at 06/05/20 0239     cyclobenzaprine (FLEXERIL) tablet 5-10 mg, 5-10 mg, Oral, TID PRN, Heaven Ryan MD     heparin  drip 25,000 units in 0.45% NaCl 250 mL  ANTICOAGULANT  (see additional  administration details for dose), 0-3,500 Units/hr, Intravenous, Continuous, Heaven Ryan MD, Last Rate: 19.5 mL/hr at 06/05/20 0909, 1,950 Units/hr at 06/05/20 0909     heparin bolus from infusion pump, , Intravenous, Q6H PRN, Heaven Ryan MD, 3,000 Units at 06/05/20 0610     lidocaine (LMX4) cream, , Topical, Q1H PRN, Heaven Ryan MD     lidocaine 1 % 0.1-1 mL, 0.1-1 mL, Other, Q1H PRN, Heaven Ryan MD     Medication instructions: Do NOT use nebulized medications, , Does not apply, Continuous PRN, Heaven Ryan MD     melatonin tablet 1 mg, 1 mg, Oral, At Bedtime PRN, Heaven Ryan MD     melatonin tablet 3 mg, 3 mg, Oral, At Bedtime PRN, Heaven Ryan MD, 3 mg at 06/05/20 0239     naloxone (NARCAN) injection 0.1-0.4 mg, 0.1-0.4 mg, Intravenous, Q2 Min PRN, Heaven Ryan MD     ondansetron (ZOFRAN-ODT) ODT tab 4 mg, 4 mg, Oral, Q6H PRN **OR** ondansetron (ZOFRAN) injection 4 mg, 4 mg, Intravenous, Q6H PRN, Heaven Ryan MD     Patient is already receiving anticoagulation with heparin, enoxaparin (LOVENOX), warfarin (COUMADIN)  or other anticoagulant medication, , Does not apply, Continuous PRN, Heaven Ryan MD     sodium chloride (PF) 0.9% PF flush 3 mL, 3 mL, Intracatheter, q1 min prn, Heaven Ryan MD     sodium chloride (PF) 0.9% PF flush 3 mL, 3 mL, Intracatheter, Q8H, Heaven Ryan MD     sodium chloride (PF) 0.9% PF flush 3 mL, 3 mL, Intracatheter, q1 min prn, Heaven Ryan MD     sodium chloride (PF) 0.9% PF flush 3 mL, 3 mL, Intracatheter, Q8H, Heaven Ryan MD     [COMPLETED] 0.9% sodium chloride BOLUS, 1,000 mL, Intravenous, Once, Stopped at 06/04/20 1748 **FOLLOWED BY** sodium chloride 0.9% infusion, 1,000 mL, Intravenous, Continuous, Heaven Ryan MD, Last Rate: 75 mL/hr at 06/05/20 0900    Allergies:     Allergies   Allergen Reactions     Atorvastatin      myalgia     Bactrim  "[Sulfamethoxazole W/Trimethoprim]      hives        OBJECTIVE DATA  Blood pressure 129/87, pulse 94, temperature 97.2  F (36.2  C), temperature source Oral, resp. rate 18, height 1.727 m (5' 8\"), weight 125.5 kg (276 lb 10.8 oz), SpO2 96 %.  -- General: no acute distress; well-developed and well-nourished   -- HEENT: mucous membranes moist, no erythema; pupils equally round  -- Cardiovascular: LLE is cooler than right, pulses minimally palpable  -- Pulmonary: breathing comfortably on room air  -- Gastrointestinal: abdomen soft, non-tender, non-distended  -- Musculoskeletal: no swelling or erythema of joints  -- Integumentary: no rashes  -- Neurologic: alert and oriented to self, place, time; no gross abnormalities  -- Psychiatric: appropriate affect, cooperative    I have independently reviewed the new laboratory results and imaging studies.  IL-6 18.9  WBC 7.4  Hgb 13.0  Plt 450  INR 1.14  PTT 62 on heparin  Fibrinogen 585  Antithrombin III level 72  D-dimer 3.6    Assessment & recommendations:  Acute arterial thrombus  COVID-19 with coagulopathy    -- Agree with IR thrombectomy and/or lytics  -- Please use heparin/lytics per IR protocol for now  -- We will make discharge recommendations for anticoagulations; will likely leave on a DOAC, but will need to monitor kidney/liver function in order to anticipate the best choice  -- No indication for tocilizumab now, as he is likely over the peak of symptoms given the timing    Patient seen and discussed with Dr. Rodriguez.    Martha Bonilla MD  Hematology-Oncology Fellow    "

## 2020-06-05 NOTE — PLAN OF CARE
Time: Admission at 0100-0730    Reason for admission: Thrombus  Activity: Ind in room  Pain: No c/o of pain this shift.   Neuro: AOx4, pleasant able to make needs known  Cardiac:  WDL  Respiratory: Frequent cough, tessalon jacob given x1.  GI/: Voids spontaneously w/o difficulty. LBM 6/4.   Diet: NPO incase surgery is neccessary. Ice chips given.  Lines: L PIV infusing NS at 75ml/hr and Heparin at 1950units/hr.   Skin: blanchable redness but remains intact.   Labs/Imaging: COVID POSITIVE. CT completed showing multiple thrombus as reflecting in results. Hep Xa 0.29, bolus of 3000 units given and continuous rate increased by 150units/hr.    New changes this shift: Pt did not get much sleep d/t multiple tests and movement throughout the night.     Plan: Vascular following. Remain on heparin drip. Will discharge with anticoagulation plan.

## 2020-06-05 NOTE — PROGRESS NOTES
[facility]  Transfer Triage Note    Date of call: 06/04/20  Time of call: 8PM    Is pandemic COVID-19 a concern? YES: known COVID    Reason for transfer: Further diagnostic work up, management, and consultation for specialized care   Diagnosis: ?arterial clot, leg pain and numbness and coldness approx 1 wk after diagnosis with COVID    Outside Records: NA  Additional records requested to be faxed to [phone].    Stability of Patient: Patient is vitally stable, with no critical labs, and will likely remain stable throughout the transfer process  ICU: No    Expected Time of Arrival for Transfer: 0-8 hours    Arrival Location:  [address] [address] Phone: [phone]    Recommendations for Management and Stabilization: Not needed    Additional Comments   48 yo with DM2, htn, lt leg pain, who was diagnosed with covid 5/26 and has been isolating at [facility]. Lt leg pain began, and he has an elevated d dimer with arterial and venous clot being investigated now with imaging. vasc surg were consulted, and they would like him to be treated medically with heparin on the medicine service with vasc surg consult. surg resident to see pt when he arrives [facility].    Nighat Jewell MD  6/5/2020

## 2020-06-05 NOTE — H&P
[facility], [place]    History and Physical - Hospitalist Service, [facility]       Date of Admission:  6/4/2020    Assessment & Plan   Aaron Mercado is a 49 year old has a past medical history which includes male admitted on 6/4/2020. He diabetes type 2 on metformin, hypertension who was recently diagnosed with COVID on 5/26 and has been self isolating at home (diagnosed with an ED visit without need for admission).  Patient presents today to the [place] ED with left calf pain and sensation that his foot is cold.  Labs revealed a markedly elevated d-dimer at 3.6.  Concern for left lower extremity arterial thrombus in the setting of COVID not on anticoagulation.    #Left lower extremity arterial thrombus  Patient presented to the [place] ED with left calf pain and sensation that his left foot is cold.  Labs revealed a markedly elevated d-dimer at 3.6.  Ultrasound for DVT was negative, ultrasound for arterial thrombus reported per ED with preliminary report of evidence of monophasic flow distally and evidence of clot.  Vascular surgery was called from the [place] ED and heparin initiated and patient transferred to [facility] to have surgery evaluate patient in person.  Vascular surgery team evaluated patient upon his arrival to [facility].  Vascular surgery team evaluated patient upon his arrival to [facility].  Patient with intact sensation to light touch and full range of motion/movement in his left foot.  Pulses dopplerable.  --Vascular surgery consulted, appreciate recommendations  --Per vascular team   -continue heparin drip for now   -TTE ordered    -CTA runoff to evaluate for abdominal aortic and popliteal aneurysm and location of possible thrombus   --N.p.o. except meds and ice chips until certain no procedural management needed      #Recent diagnosis of COVID 5/26  Patient on room air, significant cough but denies shortness of breath.  --Continuous pulse ox  "while inpatient  --Avoiding nebulizing medications, CPAP/BiPAP and other aerosolized procedures  --Tessalon pearls as needed  --Additional COVID labs ordered per order set for the a.m.    #History of hypertension  On lisinopril 5 mg daily prior to admission.  --Hold lisinopril until trending blood pressure, add back as tolerated      #History of diabetes type 2  On metformin prior to admission.  --Hold metformin  --Monitor glucose, will hold off on initiating sliding scale insulin while n.p.o.       Diet: N.p.o.; NS IVF 75 mL/h  DVT Prophylaxis: On heparin drip  Goldman Catheter: not present  Code Status: Full code         Disposition Plan   Expected discharge: 1 to 3 days, recommended to prior living arrangement once Vascular surgery completes inpatient evaluation and management, outpatient anticoagulation plan in place.  Entered: Heaven Ryan MD 06/05/2020, 1:26 AM     The patient's care was discussed with the Bedside Nurse and Patient.    Heaven Ryan MD  [facility] [place]  Pager: [phone]  Please see sticky note for cross cover information  ______________________________________________________________________    Chief Complaint   Left calf pain, cold left foot    History is obtained from the patient    History of Present Illness   Aaron Mercado is a 49 year old has a past medical history which includes male admitted on 6/4/2020. He diabetes type 2 on metformin, hypertension who was recently diagnosed with COVID on 5/26 and has been self isolating at home (diagnosed with an ED visit without need for admission).  Patient reports that today he started having significant pain in his left calf that he describes as a cramping type of pain and then his left foot felt \"cold\".  He presented to the emergency room to have evaluation.  Labs revealed a markedly elevated d-dimer at 3.6.  Ultrasound for DVT was negative, ultrasound for arterial thrombus reported per ED provider with " preliminary report of evidence of monophasic flow distally and evidence of clot.  Vascular surgery was called from the [place] ED and heparin initiated and patient transferred to [facility] to have surgery evaluate patient in person.    Patient reports that since onset of symptoms with COVID, that his symptoms have been up and down, at times it seems like his cough is getting better and that his cough will return and be severe again.  He is frustrated with not feeling well and especially with the ongoing difficulty with cough.  He reports his breathing is comfortable at this time, denies any other pain including chest pain and just notes the pain in his calf and the coldness in his foot per above.    Review of Systems    The 10 point Review of Systems is negative other than noted in the HPI or here.     Past Medical History    I have reviewed this patient's medical history and updated it with pertinent information if needed.   Past Medical History:   Diagnosis Date     Diabetes (H)      Hypertension      Obesity        Past Surgical History   I have reviewed this patient's surgical history and updated it with pertinent information if needed.  Past Surgical History:   Procedure Laterality Date     C ORAL SURGERY PROCEDURE  2000    [place] teeth extraction     LASIK 2003       Social History   I have reviewed this patient's social history and updated it with pertinent information if needed.  Social History     Tobacco Use     Smoking status: Never Smoker     Smokeless tobacco: Never Used     Tobacco comment: Non smoke home   Substance Use Topics     Alcohol use: Yes     Comment: 1-2 nights out during the week     Drug use: No       Family History   I have reviewed this patient's family history and updated it with pertinent information if needed.   Family History   Problem Relation Age of Onset     Diabetes Mother      Family History Negative Father      Family History Negative Sister      Family History Negative  Sister      Family History Negative Brother      Family History Negative Brother      Family History Negative Brother      Family History Negative Sister      Family History Negative Son        Prior to Admission Medications   Prior to Admission Medications   Prescriptions Last Dose Informant Patient Reported? Taking?   acetaminophen (TYLENOL) 325 MG tablet 6/3/2020 at Unknown time  No Yes   Sig: Take 2 tablets (650 mg) by mouth every 4 hours as needed for mild pain   azithromycin (ZITHROMAX Z-MINDY) 250 MG tablet   No No   Sig: Two tablets on the first day, then one tablet daily for the next 4 days   cyclobenzaprine (FLEXERIL) 5 MG tablet   No No   Sig: Take 1-2 tablets (5-10 mg) by mouth 3 times daily as needed for muscle spasms   Patient not taking: Reported on 10/29/2019   lisinopril (ZESTRIL) 5 MG tablet 6/4/2020 at Unknown time  No Yes   Sig: TAKE 1 TABLET BY MOUTH ONCE DAILY   metFORMIN (GLUCOPHAGE) 500 MG tablet 6/4/2020 at Unknown time  No Yes   Sig: Take 1 tablet (500 mg) by mouth daily (with dinner)   ofloxacin (FLOXIN) 0.3 % otic solution   No No   Sig: Place 5 drops Into the left ear 2 times daily   Patient not taking: Reported on 3/27/2020   traMADol (ULTRAM) 50 MG tablet   No No   Sig: Take 1 tablet (50 mg) by mouth nightly as needed for moderate to severe pain   Patient not taking: Reported on 3/27/2020      Facility-Administered Medications: None     Allergies   Allergies   Allergen Reactions     Atorvastatin      myalgia     Bactrim [Sulfamethoxazole W/Trimethoprim]      hives       Physical Exam   Vital Signs: Temp: 98  F (36.7  C)   BP: 108/69 Pulse: 94 Heart Rate: 77 Resp: 18 SpO2: 94 % O2 Device: None (Room air) Oxygen Delivery: 2 LPM  Weight: 270 lbs 0 oz  Gen: NAD, lying comfortably in bed, pleasant and cooperative with interview and exam  HEENT: normocephalic, no scleral icterus  CV: Extremities warm with the exception of a slightly cooler left foot, difficult to palpate dorsalis pedis pulse  on the left  Pulm: No increased work of breathing, able to speak in full sentences with ease except for with intermittent coughing, no tachypnea  Abd: soft, +bs, obese, nontender to palpation diffusely, nondistended  LE: no edema bilaterally; mild discomfort to palpation on the left calf, left foot is noticeably cooler than the right foot but is not cold to touch  Skin: no rash or jaundice on limited exam; no redness noted on exam of left lower extremity including left calf and left foot  Neuro: AOx3, no tremor; intact sensation to light touch in the left foot and calf  Psych: mood-affect congruent      Data   Data reviewed today: I reviewed all medications, new labs and imaging results over the last 24 hours.    Most recent labs and imaging reviewed.

## 2020-06-05 NOTE — ANESTHESIA PROCEDURE NOTES
Arterial Line Procedure Note  Staff -   Anesthesiologist:  Pranav Nguyễn DO      Performed By: anesthesiologist          Location: In OR After Induction  Procedure Start/Stop Times:     patient identified, IV checked, site marked, risks and benefits discussed, informed consent, monitors and equipment checked, pre-op evaluation and at physician/surgeon's request      Correct Patient: Yes      Correct Position: Yes      Correct Site: Yes      Correct Procedure: Yes      Correct Laterality:  N/A    Site Marked:  N/A  Line Placement:     Procedure:  Arterial Line    Insertion Site:  Radial    Insertion laterality:  Left    Skin Prep: Chloraprep      Patient Prep: patient draped, mask, sterile gloves, hat and hand hygiene      Local skin infiltration:  None    Ultrasound Guided?: Yes      Artery evaluated via ultrasound confirming patency.   Using realtime imaging, the artery was punctured and the needle was observed entering the artery.      A permanent image is entered into patient's chart.      Catheter size:  20 gauge, Quick cath    Dressing:  Tegaderm    Complications:  None obvious    Arterial waveform: Yes      IBP within 10% of NIBP: Yes

## 2020-06-05 NOTE — CONSULTS
Vascular Surgery Consultation Note    Aaron Mercado  MRN: [id number]  male  49 year old    Reason for Consult: PAD     Chief Complaint:  Left leg pain     Admitting Diagnosis:   1. Arterial insufficiency of lower extremity (H)    2. 2019 novel coronavirus disease (COVID-19)        Assessment & Plan:  49 year old male w/ DMII, HTN, HLD, Covid+ (5/26) presented w/ sudden onset symptoms of LLE pain and sensation of cold left foot found to have diminished pulses concerning for ischemic limb. An aterial US demonstrated monophasic flow distally and evidence for clot. He was started on heparin gtt in ED. Examination demonstrated cool left foot, though with doppler signals and intact motor & sensation along with resolution of extremity pain. Unclear etiology of thrombus at this time to include hypercoagulable state d/t covid vs cardio embolic event, recommend further workup.     - Agree with high intensity heparin   - Recommend ECHO   - Recommend CTA runoff to evaluate for abdominal aortic and popliteal aneurysm and location of possible thrombus   - Vascular surgery will continue to follow     D/w Dr. Palak Leach MD      HPI:  49 year old male w/ DMII, HTN, HLD, Covid+ (5/26) admitted after developing left lower extremity pain in the AM of 6/4/20 found to have diminished pulses and cool L foot in the ED. States, pain started in the morning in his left posterior calf radiating to his popliteal fossa accompanied by cold foot up to the calf. Patient could not ambulate due to the pain, thus presented to the ED for evaluation. He has never had similar symptoms, nor has diagnosis or evaluation for PAD. An arterial US demonstrated monophasic flow distal and evidence for clot. Venous US negative for DVT. Labs remarkable for slightly elevated lactate acid (2.2) and elevated LFTs, otherwise normal electrolytes, kidney function, and no leukocytosis. He was started on a heparin gtt and transferred to [place] for  further cares. Of note, he did have an episode of left leg swelling and cellulitis in 2018 that resolved with multiple rounds of antibiotics. Chronic medical issues include diabetes for which he takes metformin, states his blood sugars have been under control.     Currently, patient notes improvement to pain after heparin gtt started as he was able to ambulate to the bathroom without issue. There is no pain at rest. Denies numbness or tingling.     Patient tested positive for covid on 5/26. He continues to have some shortness of breath, especially with ambulation but his cough has resolved.     Patient Active Problem List   Diagnosis     obese bmi over 35     Genital warts     Eczema     Type 2 diabetes mellitus without complication (H)     Hypertension goal BP (blood pressure) < 140/90     Hyperlipidemia with target LDL less than 100     Major depressive disorder, recurrent episode, moderate (H)     Cellulitis     Acute bilateral low back pain without sciatica       Past Surgical History:   Past Surgical History:   Procedure Laterality Date     C ORAL SURGERY PROCEDURE  2000    [place] teeth extraction     LASIK  2003       Past Medical History:   Past Medical History:   Diagnosis Date     Diabetes (H)      Hypertension      Obesity        Social History:   Social History     Tobacco Use     Smoking status: Never Smoker     Smokeless tobacco: Never Used     Tobacco comment: Non smoke home   Substance Use Topics     Alcohol use: Yes     Comment: 1-2 nights out during the week       Family History:   Family History   Problem Relation Age of Onset     Diabetes Mother      Family History Negative Father      Family History Negative Sister      Family History Negative Sister      Family History Negative Brother      Family History Negative Brother      Family History Negative Brother      Family History Negative Sister      Family History Negative Son         Allergies:   Allergies   Allergen Reactions     Atorvastatin   "    myalgia     Bactrim [Sulfamethoxazole W/Trimethoprim]      hives       Active Medications:   No current outpatient medications on file.       ROS:  Otherwise negative    Physical Examination:   Vital Signs: BP (!) 145/87   Pulse 94   Temp 96.3  F (35.7  C) (Axillary)   Resp 20   Ht 1.727 m (5' 8\")   Wt 125.5 kg (276 lb 10.8 oz)   SpO2 95%   BMI 42.07 kg/m    GEN: Alert, oriented, cooperative with examination.   Respiratory: NLB on RA, mask in place   Cardiovascular: Reg rate and rhythm   Abdomen: soft, obese, nontender, nondistended   Extremities: Left lower extremity mildly cool to touch compared to right lower extremity. No palpable L DP, 1+ PT pulse, both PT and DP had doppler signals. Strength and ROM intact to BLE. No pain to palpation of BLE. Femoral pulses intact b/l and R DP and PT pulses palpable.     Labs/Imaging/Other:  Na 136  K 3.6  Cr 0.64  T bili 0.4        Lactic acid 2.2   WBC 6.8  Hgb 13.7  INR 1.11  D dimer 3.6   Glucose 130s    US venous doppler   No deep venous thrombosis in the left lower extremity.    US arterial doppler pending final result  Evidence for clot LLE, monophasic signal       "

## 2020-06-05 NOTE — CONSULTS
INTERVENTIONAL RADIOLOGY CONSULTATION    Name: Aaron Mercado  Age: 49 year old   Referring Physician: Dr. Alcala from vascular surgery  REASON FOR REFERRAL: Threatened left lower extremity    Assessment/plan: 49-year-old male with acute left leg rest pain and short distance claudication, on a background of COVID positive results and associated CT findings.  Patient's pain has improved since initiation of anticoagulation last night.  The foot is warm with dopplerable PT and DP pulses.  CTA demonstrates thrombus involving the left profunda femoris and left popliteal artery extending into the tibial vessels.  Motor and sensory functions are intact.  Findings consistent with acute limb ischemia, likely in a transient hypercoagulable state.  No known embolic source could be identified, so far the cardiac echo has been negative.    Plan left lower extremity angiography, vacuum-assisted thromboembolectomy and possible initiation of catheter directed thrombolysis based on recommendations from vascular surgery.    I, Dr Con Newman, was performed the entirety of history and exam. I have documented the findings as well as the assessment and plan.    A total of 35 minutes was spent on this consultation, more than half was on direct counseling and coordination of the care.    Con Newman MD    Vascular and Interventional Radiolgy  [facility]

## 2020-06-05 NOTE — PROGRESS NOTES
[facility], [facility] Progress Note - Hospitalist Service, Gold 6       Date of Admission:  6/4/2020  Assessment & Plan   Aaron Mercado is a 49 year old has a past medical history which includes male admitted on 6/4/2020. He diabetes type 2 on metformin, hypertension who was recently diagnosed with COVID on 5/26 and has been self isolating at home (diagnosed with an ED visit without need for admission).  Patient presents today to the [place] ED with left calf pain and sensation that his foot is cold.  Labs revealed a markedly elevated d-dimer at 3.6.  Concern for left lower extremity arterial thrombus in the setting of COVID not on anticoagulation.    #ACUTE Left lower extremity arterial thrombus  Patient presented to the [place] ED with left calf pain and sensation that his left foot is cold.  Labs revealed a markedly elevated d-dimer at 3.6.  Ultrasound for DVT was negative, ultrasound for arterial thrombus reported per ED with preliminary report of evidence of monophasic flow distally and evidence of clot.  Vascular surgery was called from the [place] ED and heparin initiated and patient transferred to [facility] to have surgery evaluate patient in person.  Vascular surgery team evaluated patient upon his arrival to [facility].  Vascular surgery team evaluated patient upon his arrival to [facility].  Patient with intact sensation to light touch and full range of motion/movement in his left foot.  Pulses dopplerable.  --Vascular surgery consulted, appreciate recommendations  --Per vascular team              -continue heparin drip for now              -TTE ordered               -CTA runoff to evaluate for abdominal aortic and popliteal aneurysm and location of possible thrombus   - N.p.o. except meds and ice chips until certain no procedural management needed  - 6/5: Awaiting vascular call back, RN Reports they plan thrombectomy today  - VASCULAR UPDATE: The  patient's acute symptoms have largely resolved on IV heparin, and there are signals obtained by Doppler in the left foot. CTA shows thrombus in the left profunda and popliteal arteries with extension into the tibial circulation. We have requested consideration of endovascular suction thrombectomy and lytic therapy by IR. No open vascular intervention planned at this time.  - Hematology consulted, Discussed with SCOTTY Caal, no need to proceed with Tocilizumab therapy at this time. Trend IL-6 only if worsening, and trend CRP as appropriate.        #Recent diagnosis of COVID 5/26  - 5/26: COVID (+)  - 6/5:    COVID (-); CONTINUE ISOLATION for DURATION Hospital stay  - HIGH IL-6 (18)  - STATUS: Patient on room air, significant cough but denies shortness of breath.  --Continuous pulse ox while inpatient  --Avoiding nebulizing medications, CPAP/BiPAP and other aerosolized procedures  --Tessalon pearls as needed  - Hematology consulted, Discussed with SCOTTY Caal, no need to proceed with Tocilizumab therapy at this time. Trend IL-6 only if worsening, and trend CRP as appropriate.     #History of hypertension  On lisinopril 5 mg daily prior to admission.  --6/5: Hold lisinopril until trending blood pressure, add back as tolerated        #History of diabetes type 2  --On metformin prior to admission.  --Hold metformin  --Monitor glucose, will hold off on initiating sliding scale insulin while n.p.o.          Diet: NPO per Anesthesia Guidelines for Procedure/Surgery Except for: Meds, Ice Chips    DVT Prophylaxis: Full Heparin drip  Goldman Catheter: not present  Code Status: Full Code           Disposition Plan   Expected discharge: 2 - 3 days, recommended to prior living arrangement once s/p Thrombectomy and stable per vascular.  Entered: Alexis Rico MD 06/05/2020, 1:15 PM       The patient's care was discussed with the Bedside Nurse and Patient.    Alexis Rico MD  Hospitalist Service[address]  [facility], [place]  Pager: [phone]  Please see sticky note for cross cover information  ______________________________________________________________________    Interval History     Admitted overnight.   Still c/o L leg and foot numbness and poor sensation, cool to touch.   Still c/o Cough, occasionally productive    Data reviewed today: I reviewed all medications, new labs and imaging results over the last 24 hours. I personally reviewed no images today.    Physical Exam   Vital Signs: Temp: 97.2  F (36.2  C) Temp src: Oral BP: 129/87 Pulse: 94 Heart Rate: 79 Resp: 18 SpO2: 96 % O2 Device: None (Room air) Oxygen Delivery: 2 LPM  Weight: 276 lbs 10.84 oz    General: Alert awake and oriented, no distress, conversational  Eyes: Normal pink mucosa with no signs of pallor, no scleral icterus.  Neck: No significant lymphadenopathy, no palpable LN, No JVD  Heart: Regular rate and rhythm, normal S1, normal S2, no murmurs rubs or gallops, PMI is nondisplaced   Peripherally there is no edema   LLE: DP pulse-not palpated, cool to touch, reduced sensation   RLE: Good DP pulse, coloration, intact sensation  Lungs: No increased work of breathing, good effort, lungs are clear to auscultation bilaterally   No wheezing or crackles   Breathing on room air  Abdomen: Nontender, nondistended, normal active bowel sounds, no palpable masses  Extremities: Normal capillary refill, no edema, no clubbing, no cyanosis  Neuro: Alert and oriented to person place location and situation   Grossly arms and legs are without any focal motor or sensory deficits   Gait was not assessed   Cranial nerves II through XII are grossly intact  Psych: No acute psychosis, good mood, good spirits      Data   Recent Labs   Lab 06/05/20  0512 06/04/20  1610   WBC 7.4 6.8   HGB 13.0* 13.7   MCV 94 92    484*   INR 1.14 1.11    136   POTASSIUM 4.1 3.6   CHLORIDE 107 105   CO2 23 24   BUN 8 11   CR 0.71 0.64*   ANIONGAP 7 7   DIONY 8.5 9.2   GLC 97  133*   ALBUMIN 2.2* 2.5*   PROTTOTAL 6.5* 7.7   BILITOTAL 0.3 0.4   ALKPHOS 87 110   * 274*   * 132*   TROPI <0.015  --      Recent Results (from the past 24 hour(s))   US Low Extremity Arterial Duplex Left Port    Narrative    EXAM: Duplex ultrasound of left lower extremity arteries dated  6/4/2020 5:50 PM     HISTORY: cold left foot     COMPARISON: None    TECHNIQUE: Grayscale (B-mode), color Doppler, and duplex spectral  Doppler ultrasound of the lower extremity arteries. Velocity  measurements obtained with angle correction of 60 degrees or less.    FINDINGS:     LEFT LOWER EXTREMITY:     Common femoral artery: Velocity: 100 cm/sec.   Profunda femoral artery: Velocity: 98 cm/sec.   Proximal SFA: Velocity: 98 cm/sec.   Mid SFA:Velocity:  89 cm/sec.   Distal SFA: Velocity: 39 cm/sec.     Popliteal artery, proximal: Velocity: 36 cm/sec.     PTA ankle: Velocity: 13 cm/sec.     WAVEFORMS/FINDINGS: Minimal flow in the posterior tibial artery.      Impression    IMPRESSION:   1. Doppler arterial ultrasound demonstrates post occlusive flow in the  left posterior tibial artery suggestive of significant infrapopliteal  disease in this vessel.      SHARITA RAMIREZ MD   US Lower Extremity Venous Duplex Left    Narrative    US LEFT LOWER EXTREMITY VENOUS DUPLEX ULTRASOUND  6/4/2020 9:41 PM    CLINICAL HISTORY: Left lower extremity arterial vascular  insufficiency. Cold left foot.    TECHNIQUE: Venous Duplex ultrasound of the left lower extremity with  and without compression, augmentation and duplex. Color flow and  spectral Doppler with waveform analysis performed.    COMPARISON: None.    FINDINGS: Exam includes the common femoral, femoral, popliteal, and  contralateral common femoral veins as well as segmentally visualized  deep calf veins and greater saphenous vein.     LEFT: No deep vein thrombosis. No superficial thrombophlebitis. No  popliteal cyst.      Impression    IMPRESSION: No deep venous thrombosis  in the left lower extremity.    JORGE GRAEVS MD   CTA Abdomen Pelvis Bilat Leg Runoff w Contr   Result Value    Radiologist flags Liver lesion    Narrative    Exam: Computed tomographic angiography of the abdomen, pelvis, and  bilateral lower extremities with contrast dated ] 6/5/2020 5:32 AM     HISTORY: Covid positive patient with acute onset left leg pain,  coolness and diminished pulses.    Technique: Axial images obtained of the abdomen, pelvis, and lower  extremities through the feet obtained following the injection of  contrast media in the arterial phase. Source images reviewed as well  as 3D and multi-planar reconstructions.    Contrast: 150 ml isovue 370     DLP: 3195 mGy*cm    Comparison: Arterial Doppler ultrasound, 6/4/2020 .    FINDINGS:      Abdominal aorta:    Patent, no aneurysm or evidence of dissection. Widely patent celiac  trunk, SMA, HAYLEY and bilateral renal arteries.    Right pelvis and lower extremity:    Common iliac artery: Widely patent  External iliac artery: Widely patent   Common femoral artery: Widely patent   Profunda femoral artery: Widely patent  Superficial femoral artery: Widely patent  Popliteal artery: Widely patent  Tibioperoneal trunk: Widely patent  Anterior tibial artery: Diminutive, intermittently opacified, delayed  phase through the calves was not obtained to evaluate this further.  Posterior: Widely patent  Peroneal: Widely patent    Left pelvis and lower extremity:    Common iliac artery: Widely patent  External iliac artery: Widely patent   Common femoral artery: Widely patent   Profunda femoral artery: The origin is patent. Occlusive  thromboembolus is present from its proximal portion extending into the  intramuscular branches eventually reconstituted. No collaterals are  present.  Superficial femoral artery: Widely patent  Popliteal artery: Proximal and midportion are widely patent. Near  occlusive thromboembolus in the distal portion below the knee joint,  extending  into the proximal tibial arteries. No collaterals are seen.  Tibioperoneal trunk: Extension of popliteal thromboembolus through the  tibioperoneal trunk.  Anterior tibial artery: Extension of popliteal thromboembolus into the  BRODIE origin. Remainder BRODIE is intermittently opacified, though delayed  scan through the calves was not obtained and therefore it is difficult  to ascertain whether this is due to lack of flow versus intermittent  occlusion versus pre-existing diminutive size of the vessel, the  latter a possibility since the right BRODIE is also diminutive.  Posterior: Extension of tibioperoneal thromboembolus into the PTA  origin. Remainder is patent to the foot.  Peroneal: Extension of tibioperoneal thromboembolus into the proximal  peroneal artery. Remainder is patent to the foot.    Abdomen: 15 mm hypodense lesion laterally in segment 6 of the liver  (series 7 image 32, series 8 image 186). Spleen, gallbladder,  pancreas, adrenal glands, kidneys appear normal. The stomach, small  and colon appear normal. Normal appendix. No mesenteric or  retroperitoneal lymphadenopathy. Pelvic organs and urinary bladder are  unremarkable. Small bilateral hydroceles in the scrotum.     Chest: Multifocal predominantly peripheral groundglass consolidative  changes in both lungs associated with intralobular septal thickening.  No pleural effusion.     Bones and soft tissues: No aggressive appearing osseous lesions. Mild  degenerative changes in the lumbar spine, hip joints and knee joints.  Small left knee joint effusion in suprapatellar pouch, and trace right  knee joint effusion in suprapatellar pouch.      Impression    IMPRESSION:    1. Abdominal aorta: No aneurysm or evidence of abdominal aortic  dissection.  2. Right lower extremity: Widely patent arteries, though BORDIE is  diminutive.  3. Left lower extremity: Acute appearing thromboembolism in the  proximal profunda femoris artery, as well as the distal popliteal  artery  extending into the proximal tibial arteries.  4. Multifocal groundglass opacities in the visualized lung bases, most  compatible with viral pneumonia from known Covid-19 infection.  5. 1.5 cm hypodense liver lesion, indeterminate - recommend liver MRI  for further evaluation.  6. Small bilateral knee joint effusions.        [Consider Follow Up: Liver lesion]    This report will be copied to the [facility] to ensure a  provider acknowledges the finding.     I have personally reviewed the examination and initial interpretation  and I agree with the findings.    NASIR FIGUEROA MD   Echo Complete    Narrative    [id number]  [id number]  [id number]  [id number]^JASON^TERESE^ADRIAN           [facility],[place]  Echocardiography Laboratory  [address] [address]     Name: ERNIE SHULTZ  MRN: [id number]  : 1970  Study Date: 2020 10:43 AM  Age: 49 yrs  Gender: Male  Patient Location: [facility]  Reason For Study: Arterial Embolism and Thrombosis  Ordering Physician: TERESE GOMEZ  Performed By: Bambi Contreras RDCS     BSA: 2.3 m2  Height: 68 in  Weight: 276 lb  HR: 74  BP: 145/87 mmHg  _____________________________________________________________________________  __        Procedure  Echocardiogram with two-dimensional, color and spectral Doppler performed.  Contrast Definity. Technically difficult study.Extremely poor acoustic  windows. Limited information was obtained during study. Definity (NDC #19589-  011-16) given intravenously. Patient was given 5ml mixture of 1.5ml Definity  and 8.5ml saline. 5 ml wasted.  _____________________________________________________________________________  __        Interpretation Summary  Global and regional left ventricular function is normal with an EF of 55-60%.  Technically difficult study.Extremely poor acoustic windows.  Limited information was obtained during study.  Mild right ventricular dilation is present.  Global  right ventricular function is normal.  Pulmonary artery systolic pressure cannot be assessed.  The inferior vena cava cannot be assessed.  No pericardial effusion is present.  _____________________________________________________________________________  __        Left Ventricle  Global and regional left ventricular function is normal with an EF of 55-60%.  Left ventricular wall thickness is normal. Left ventricular size is normal.  Left ventricular diastolic function is not assessable. No regional wall motion  abnormalities are seen.     Right Ventricle  Global right ventricular function is normal. Mild right ventricular dilation  is present.     Atria  Both atria appear normal.     Mitral Valve  The mitral valve is normal.        Aortic Valve  Aortic valve is normal in structure and function.     Tricuspid Valve  Trace to mild tricuspid insufficiency is present. Pulmonary artery systolic  pressure cannot be assessed.     Pulmonic Valve  The pulmonic valve is normal.     Vessels  The thoracic aorta is normal. The pulmonary artery and bifurcation cannot be  assessed. The inferior vena cava cannot be assessed.     Pericardium  No pericardial effusion is present.        Compared to Previous Study  Previous study not available for comparison.  _____________________________________________________________________________  __  MMode/2D Measurements & Calculations     IVSd: 1.1 cm  LVIDd: 4.8 cm  LVIDs: 3.0 cm  LVPWd: 1.1 cm  FS: 38.3 %  LV mass(C)d: 190.9 grams  LV mass(C)dI: 81.5 grams/m2  asc Aorta Diam: 3.0 cm  LVOT diam: 2.2 cm  LVOT area: 3.8 cm2  LA Volume (BP): 70.6 ml  LA Volume Index (BP): 30.2 ml/m2  RWT: 0.45           Doppler Measurements & Calculations  MV E max angeles: 76.6 cm/sec  MV A max angeles: 59.7 cm/sec  MV E/A: 1.3  MV dec slope: 351.0 cm/sec2  PA acc time: 0.14 sec  E/E' av.3  Lateral E/e': 7.7  Medial E/e': 9.0     _____________________________________________________________________________  __            Report approved by: Aroldo Brothers 06/05/2020 11:43 AM        Medications     HEParin 1,950 Units/hr (06/05/20 0909)     - MEDICATION INSTRUCTIONS -       - MEDICATION INSTRUCTIONS -       sodium chloride 75 mL/hr at 06/05/20 0900       sodium chloride (PF)  3 mL Intracatheter Q8H     sodium chloride (PF)  3 mL Intracatheter Q8H

## 2020-06-05 NOTE — PROGRESS NOTES
"  /87   Pulse 94   Temp 97.2  F (36.2  C) (Oral)   Resp 18   Ht 1.727 m (5' 8\")   Wt 125.5 kg (276 lb 10.8 oz)   SpO2 96%   BMI 42.07 kg/m      Time: 2566-7286     Reason for admission: Thrombus  Activity: Pt up independently within his room  Pain: Denies  Neuro: WDL, pt has flat affect and appears fatigued. He denies any numbness in extremities.   Cardiac: WDL  Respiratory: Infrequent cough, denies any SOB  GI/: Voids frequently without difficulty, no BM this shift  Diet:  NPO ex ice chips, pt awaiting thrombectomy  Lines: Heparin running per MD orders and NS running @75 mL per hr  Skin/Wounds: Intact  Labs: Pt remains on COVID-19 precautions for the remainder of his hospital stay. Today he was re-swabbed and came back negative for the virus, team aware.     New changes this shift: Pt had echo completed today, see results.      Plan: Pt has procedure scheduled soon to remove clot and will transfer to ICU afterwards per MD recommendation. Continue to monitor and follow POC.      "

## 2020-06-06 PROBLEM — I74.3 FEMORAL POPLITEAL ARTERY THROMBUS (H): Status: ACTIVE | Noted: 2020-06-06

## 2020-06-06 LAB
BASOPHILS # BLD AUTO: 0 10E9/L (ref 0–0.2)
BASOPHILS NFR BLD AUTO: 0.5 %
CRP SERPL-MCNC: 22 MG/L (ref 0–8)
D DIMER PPP FEU-MCNC: 1.9 UG/ML FEU (ref 0–0.5)
DIFFERENTIAL METHOD BLD: ABNORMAL
EOSINOPHIL # BLD AUTO: 0.1 10E9/L (ref 0–0.7)
EOSINOPHIL NFR BLD AUTO: 1.3 %
ERYTHROCYTE [DISTWIDTH] IN BLOOD BY AUTOMATED COUNT: 12.6 % (ref 10–15)
FIBRINOGEN PPP-MCNC: 553 MG/DL (ref 200–420)
HCT VFR BLD AUTO: 36.7 % (ref 40–53)
HGB BLD-MCNC: 12.3 G/DL (ref 13.3–17.7)
IMM GRANULOCYTES # BLD: 0.1 10E9/L (ref 0–0.4)
IMM GRANULOCYTES NFR BLD: 1.5 %
INR PPP: 1.19 (ref 0.86–1.14)
LDH SERPL L TO P-CCNC: 228 U/L (ref 85–227)
LMWH PPP CHRO-ACNC: 0.61 IU/ML
LMWH PPP CHRO-ACNC: 1.3 IU/ML
LYMPHOCYTES # BLD AUTO: 1.8 10E9/L (ref 0.8–5.3)
LYMPHOCYTES NFR BLD AUTO: 21.3 %
MCH RBC QN AUTO: 31.8 PG (ref 26.5–33)
MCHC RBC AUTO-ENTMCNC: 33.5 G/DL (ref 31.5–36.5)
MCV RBC AUTO: 95 FL (ref 78–100)
MONOCYTES # BLD AUTO: 0.7 10E9/L (ref 0–1.3)
MONOCYTES NFR BLD AUTO: 8.3 %
NEUTROPHILS # BLD AUTO: 5.7 10E9/L (ref 1.6–8.3)
NEUTROPHILS NFR BLD AUTO: 67.1 %
NRBC # BLD AUTO: 0 10*3/UL
NRBC BLD AUTO-RTO: 0 /100
PLATELET # BLD AUTO: 461 10E9/L (ref 150–450)
RBC # BLD AUTO: 3.87 10E12/L (ref 4.4–5.9)
RETICS # AUTO: 78.6 10E9/L (ref 25–95)
RETICS/RBC NFR AUTO: 2 % (ref 0.5–2)
TROPONIN I SERPL-MCNC: <0.015 UG/L (ref 0–0.04)
WBC # BLD AUTO: 8.5 10E9/L (ref 4–11)

## 2020-06-06 PROCEDURE — 99232 SBSQ HOSP IP/OBS MODERATE 35: CPT | Mod: GC | Performed by: INTERNAL MEDICINE

## 2020-06-06 PROCEDURE — 85520 HEPARIN ASSAY: CPT | Performed by: INTERNAL MEDICINE

## 2020-06-06 PROCEDURE — 83615 LACTATE (LD) (LDH) ENZYME: CPT | Performed by: INTERNAL MEDICINE

## 2020-06-06 PROCEDURE — 85610 PROTHROMBIN TIME: CPT | Performed by: INTERNAL MEDICINE

## 2020-06-06 PROCEDURE — 25000132 ZZH RX MED GY IP 250 OP 250 PS 637: Performed by: STUDENT IN AN ORGANIZED HEALTH CARE EDUCATION/TRAINING PROGRAM

## 2020-06-06 PROCEDURE — 85045 AUTOMATED RETICULOCYTE COUNT: CPT | Performed by: INTERNAL MEDICINE

## 2020-06-06 PROCEDURE — 25000132 ZZH RX MED GY IP 250 OP 250 PS 637: Performed by: INTERNAL MEDICINE

## 2020-06-06 PROCEDURE — 84484 ASSAY OF TROPONIN QUANT: CPT | Performed by: INTERNAL MEDICINE

## 2020-06-06 PROCEDURE — 12000001 ZZH R&B MED SURG/OB UMMC

## 2020-06-06 PROCEDURE — 86140 C-REACTIVE PROTEIN: CPT | Performed by: INTERNAL MEDICINE

## 2020-06-06 PROCEDURE — 99207 ZZC CDG-MDM COMPONENT: MEETS LOW - DOWN CODED: CPT | Performed by: HOSPITALIST

## 2020-06-06 PROCEDURE — 36415 COLL VENOUS BLD VENIPUNCTURE: CPT | Performed by: NURSE PRACTITIONER

## 2020-06-06 PROCEDURE — 36415 COLL VENOUS BLD VENIPUNCTURE: CPT | Performed by: INTERNAL MEDICINE

## 2020-06-06 PROCEDURE — 85379 FIBRIN DEGRADATION QUANT: CPT | Performed by: INTERNAL MEDICINE

## 2020-06-06 PROCEDURE — 99232 SBSQ HOSP IP/OBS MODERATE 35: CPT | Performed by: HOSPITALIST

## 2020-06-06 PROCEDURE — 25000128 H RX IP 250 OP 636: Performed by: INTERNAL MEDICINE

## 2020-06-06 PROCEDURE — 25000132 ZZH RX MED GY IP 250 OP 250 PS 637: Performed by: PHYSICIAN ASSISTANT

## 2020-06-06 PROCEDURE — 25800030 ZZH RX IP 258 OP 636: Performed by: INTERNAL MEDICINE

## 2020-06-06 PROCEDURE — 85025 COMPLETE CBC W/AUTO DIFF WBC: CPT | Performed by: INTERNAL MEDICINE

## 2020-06-06 PROCEDURE — 85384 FIBRINOGEN ACTIVITY: CPT | Performed by: INTERNAL MEDICINE

## 2020-06-06 PROCEDURE — 85520 HEPARIN ASSAY: CPT | Performed by: NURSE PRACTITIONER

## 2020-06-06 RX ADMIN — ACETAMINOPHEN 650 MG: 325 TABLET, FILM COATED ORAL at 21:43

## 2020-06-06 RX ADMIN — HEPARIN SODIUM 1800 UNITS/HR: 10000 INJECTION, SOLUTION INTRAVENOUS at 13:32

## 2020-06-06 RX ADMIN — SODIUM CHLORIDE 1000 ML: 900 INJECTION INTRAVENOUS at 02:28

## 2020-06-06 RX ADMIN — ACETAMINOPHEN 500 MG: 500 TABLET, FILM COATED ORAL at 13:29

## 2020-06-06 RX ADMIN — LIDOCAINE 1 PATCH: 560 PATCH PERCUTANEOUS; TOPICAL; TRANSDERMAL at 21:42

## 2020-06-06 ASSESSMENT — ACTIVITIES OF DAILY LIVING (ADL)
ADLS_ACUITY_SCORE: 12
ADLS_ACUITY_SCORE: 13
ADLS_ACUITY_SCORE: 12

## 2020-06-06 NOTE — PROGRESS NOTES
"  /71   Pulse 87   Temp 97.9  F (36.6  C) (Oral)   Resp 18   Ht 1.727 m (5' 8\")   Wt 125.5 kg (276 lb 10.8 oz)   SpO2 92%   BMI 42.07 kg/m      Time: 0649-4468     Reason for admission: Thrombus  Activity: Pt up independently within his room  Pain: Pt reports mild shoulder pain, tylenol given  Neuro: WDL, calm and cooperative. He denies any numbness in extremities, they feel warm and pulses are present bilaterally.   Cardiac: WDL  Respiratory: WDL  GI/: Voids frequently without difficulty, no BM this shift  Diet:  Good appetite, Consistent carb diet  Lines: Heparin running through PIV per MD orders and NS maintenance fluids discontinued  Skin/Wounds: Pt skin intact, no bleeding noted. Pt reports puncture site is WDL.  Labs: Pt remains on COVID-19 precautions for the remainder of his hospital stay. His procedure went well yesterday and he will likely bridge tomorrow to oral anticoagulants.       Plan: Pt is likely to discharge tomorrow, RN has no new concerns. Continue to monitor and follow POC.      "

## 2020-06-06 NOTE — PROGRESS NOTES
[facility] Cross[facility] Note    Contacted by IR. Thrombectomy went well overall, no apparent complications at this time. Per Dr. Kumari, will resume high-intensity heparin gtt. R groin access site, no current issues, monitor for hematoma.       Minerva Chand PA-C  Hospitalist Service  Gold crosscover pager: [phone]

## 2020-06-06 NOTE — ANESTHESIA POSTPROCEDURE EVALUATION
Anesthesia POST Procedure Evaluation    Patient: Aaron Mercado   MRN:     [id number] Gender:   male   Age:    49 year old :      1970        Preoperative Diagnosis: Ischemia of left lower extremity [I99.8]   Procedure(s):  Left leg angiography  thrombembolectomy   Postop Comments: No value filed.     Anesthesia Type: General       Disposition: Admission   Postop Pain Control: Uneventful            Sign Out: Well controlled pain   PONV: No   Neuro/Psych: Uneventful            Sign Out: Acceptable/Baseline neuro status   Airway/Respiratory: Uneventful            Sign Out: Acceptable/Baseline resp. status   CV/Hemodynamics: Uneventful            Sign Out: Acceptable CV status   Other NRE: NONE   DID A NON-ROUTINE EVENT OCCUR? No         Last Anesthesia Record Vitals:  CRNA VITALS  2020 - 2020 2104      2020             Pulse:  71    SpO2:  92 %          Last PACU Vitals:  No vitals data found for the desired time range.        Electronically Signed By: Pranav Nguyễn DO, 2020, 9:11 PM

## 2020-06-06 NOTE — PROGRESS NOTES
Remdesivir Interdisciplinary Triage Team Note      This patient has been selected using standard criteria to receive Remdesivir by the [facility] Interdisciplinary Triage Team based on criteria developed and distributed by the [facility] on May 24, 2020.      The treatment team should order Remdesivir 200 mg IV x1 and then 100 mg daily for 4 subsequent days. On day 5 of treatment, the patient will be reassessed for continued need of Remdesivir by the Triage Team.    If the patient will not complete the entire course of Remdesivir or if there are questions, please text or call Dr. Jace Orozco at [phone].    Darcy Lee MD

## 2020-06-06 NOTE — PLAN OF CARE
A&O, VSS on 2L NC (post op) CAPNO on IPI 10. PIV's infusing IVF at 75 mL/hr and heparin gtt at 1950 units/hour (10a 0100). Left arterial thrombectomy accessed from incision right groin (open to air) done this shift. C/o pain left shoulder, lidocaine patch on. Pt to lay flat until 2300. Goldman catheter with adequate UOP in place, discontinue when pt off bedrest. Calls to make needs known.

## 2020-06-06 NOTE — PROCEDURES
[facility], [place]    Procedure: IR Procedure Note    Date/Time: 6/5/2020 7:59 PM  Performed by: Quoc Wells MD  Authorized by: Quoc Wells MD   IR Fellow Physician: Quoc Wells  Other(s) attending procedure: Staff: Dr. Newman    UNIVERSAL PROTOCOL   Site Marked: NA  Prior Images Obtained and Reviewed:  Yes  Required items: Required blood products, implants, devices and special equipment available    Patient identity confirmed:  Verbally with patient, arm band, provided demographic data and hospital-assigned identification number  Patient was reevaluated immediately before administering moderate or deep sedation or anesthesia  Confirmation Checklist:  Patient's identity using two indicators, relevant allergies, procedure was appropriate and matched the consent or emergent situation and correct equipment/implants were available  Time out: Immediately prior to the procedure a time out was called    Universal Protocol: the Joint Commission Universal Protocol was followed    Preparation: Patient was prepped and draped in usual sterile fashion           ANESTHESIA    Anesthesia: Local infiltration  Local Anesthetic:  Lidocaine 1% without epinephrine      SEDATION    Patient Sedated: No (GETA)    See dictated procedure note for full details.  Findings: -Mechanical thrombectomy from left popliteal, tibioperoneal, peroneal and posterior tibial arteries from right common femoral approach     Specimens: none    Complications: None    Condition: Stable    Plan: -Strict bed rest with right leg straight x 3 hours, then resume prior activities   -Resume high intensity heparin     PROCEDURE   Patient Tolerance:  Patient tolerated the procedure well with no immediate complications    Length of time physician/provider present for 1:1 monitoring during sedation: 0

## 2020-06-06 NOTE — PLAN OF CARE
Pt A&O. VSS on 1L via NC. Up with SBA. Denies pain, shoulder pain relieved with lidocaine patch. Puncture site on right groin CDI. Both legs well perfused with +2 pedal pulses and both warm to the touch. Heparin gtt reduced to 1800 units/hr d/t 10a level of 1.3. Plan for recheck this am. Goldman removed, pt voiding. NS infusing at 75 ml/hr. No BM. Plan to start remdesevir.

## 2020-06-06 NOTE — PROGRESS NOTES
"Vascular Surgery Progress Note:    Subjective:  Feels much improved.  Walking and no pain with walking.  No pain in the left lower extremity at rest either.    /85   Pulse 70   Temp 97.3  F (36.3  C) (Oral)   Resp 20   Ht 1.727 m (5' 8\")   Wt 125.5 kg (276 lb 10.8 oz)   SpO2 98%   BMI 42.07 kg/m      Gen: NAD  Heart: reg rate to 70s  Lungs: non-labored breathing  Abd: soft, non-distended, no TTP  Extremities: spontaneously moving all 4 extremities to command  Right groin puncture site: soft, without hematoma  Left foot: good 5/5 dorsi-flexion and plantar flexion and sensation intact to light touch  Doppler signal of left DP and PT  Palpable R DP    Assessment:  Likely arterial occlusion due to hypercoaguable state from COVID19 with occlusion of popliteal artery on the left LE    Now status post IR procedure with mechanical suction thrombectomy with good result    Will need to be on anti-coagulation, agree with hep gtt for now; defer to hematology on timing and selection of transition to oral agent and long term duration, would do anticoagulation for at least 6 months from our perspective    Can follow up with vascular surgery in around 4 weeks with ABIs and arterial duplex of the left LE at that time    Vascular Surgery will continue to be available for questions or concerns    Luke Pesonen  Vascular Surgery Fellow  "

## 2020-06-06 NOTE — PROGRESS NOTES
[facility], [facility] Progress Note - Hospitalist Service, Gold 6       Date of Admission:  6/4/2020  Assessment & Plan   Aaron Mercado is a 49 year old has a past medical history which includes male admitted on 6/4/2020. He diabetes type 2 on metformin, hypertension who was recently diagnosed with COVID on 5/26 and has been self isolating at home (diagnosed with an ED visit without need for admission).  Patient presents today to the [facility] ED with left calf pain and sensation that his foot is cold.  Labs revealed a markedly elevated d-dimer at 3.6.  Concern for left lower extremity arterial thrombus in the setting of COVID not on anticoagulation.    #ACUTE Left lower extremity arterial thrombus  Patient presented to the [facility] ED with left calf pain and sensation that his left foot is cold.  Labs revealed a markedly elevated d-dimer at 3.6.  Ultrasound for DVT was negative, ultrasound for arterial thrombus   - 6/5: successful IR suction thrombectomy and return to heparin drip  - 6/6: Heme plans 1 more day IV heparin drip and then likely transition to PO Anticoag on 6/7 for discharge home. No Remdesivir.     #Recent diagnosis of COVID 5/26  - 5/26: COVID (+)  - 6/5:    COVID (-); CONTINUE ISOLATION for DURATION Hospital stay  - HIGH IL-6 (18)  - STATUS: Patient on room air, significant cough but denies shortness of breath.  --Avoiding nebulizing medications, CPAP/BiPAP and other aerosolized procedures  --Tessalon pearls as needed  - Hematology consulted, Discussed with SCOTTY Caal, no need to proceed with Tocilizumab therapy at this time. Trend IL-6 only if worsening, and trend CRP as appropriate  - With resolution of COVID testing, and no pulmonary symptoms, the clinical picture does not support providing Remdesivir at this time; case discussed with Dr. Orozco on 6/6/2020.     #History of hypertension  On lisinopril 5 mg daily prior to admission.  --6/6: Continue to hold  lisinopril until trending blood pressure, add back as tolerated        #History of diabetes type 2  --On metformin prior to admission.  --6/6: Continue to hold metformin  --Monitor glucose        Diet: Regular Diet Adult    DVT Prophylaxis: Full Heparin drip  Goldman Catheter: not present  Code Status: Full Code           Disposition Plan   Expected discharge: Tomorrow, recommended to prior living arrangement once pending cleareance from Hematology team.  Entered: Alexis Rico MD 06/06/2020, 11:37 AM       The patient's care was discussed with the Bedside Nurse and Patient.    Alexis Rico MD  Hospitalist Service, [address], [place]  Pager: [phone]  Please see sticky note for cross cover information  ______________________________________________________________________    Interval History     No acute overnight events per patient or patient's family.  Yesterday, successful IR suction thrombectomy and return to Heparin drip.    This AM, feels sensation and coloration and strength of LLE is improving.  No sign of bleeding.  Aware of potential discharge but awaiting Heme recs for today.    As of: June 6, 2020  Patient denies any headache, sore throat, chest pain, shortness of breath, new rash or swelling  Patient denies any sleeping disturbance  Patient denies any nausea or vomiting or abdominal pain    Data reviewed today: I reviewed all medications, new labs and imaging results over the last 24 hours. I personally reviewed no images today.    Physical Exam   Vital Signs: Temp: 97.3  F (36.3  C) Temp src: Oral BP: 126/85 Pulse: 70 Heart Rate: 76 Resp: 20 SpO2: 98 % O2 Device: Nasal cannula Oxygen Delivery: 1 LPM  Weight: 276 lbs 10.84 oz    General: Alert awake and oriented, no distress, conversational  Eyes: Normal pink mucosa with no signs of pallor, no scleral icterus.  Neck: No significant lymphadenopathy, no palpable LN, No JVD  Heart: Regular rate and rhythm, normal S1,  normal S2, no murmurs rubs or gallops, PMI is nondisplaced   Peripherally there is no edema   LLE: DP pulse-2+ good pulse, good coloration, intact senstaion.   RLE: Good DP pulse, coloration, intact sensation  Lungs: No increased work of breathing, good effort, lungs are clear to auscultation bilaterally   No wheezing or crackles   Breathing on room air  Abdomen: Nontender, nondistended, normal active bowel sounds, no palpable masses  Extremities: Normal capillary refill, no edema, no clubbing, no cyanosis  Neuro: Alert and oriented to person place location and situation   Grossly arms and legs are without any focal motor or sensory deficits   Gait was not assessed   Cranial nerves II through XII are grossly intact  Psych: No acute psychosis, good mood, good spirits      Data   Recent Labs   Lab 06/06/20  0711 06/05/20  1650 06/05/20  0512 06/04/20  1610   WBC 8.5  --  7.4 6.8   HGB 12.3* 13.2* 13.0* 13.7   MCV 95  --  94 92   *  --  450 484*   INR 1.19*  --  1.14 1.11   NA  --  140 137 136   POTASSIUM  --  3.9 4.1 3.6   CHLORIDE  --   --  107 105   CO2  --   --  23 24   BUN  --   --  8 11   CR  --   --  0.71 0.64*   ANIONGAP  --   --  7 7   DIONY  --   --  8.5 9.2   GLC  --  91 97 133*   ALBUMIN  --   --  2.2* 2.5*   PROTTOTAL  --   --  6.5* 7.7   BILITOTAL  --   --  0.3 0.4   ALKPHOS  --   --  87 110   ALT  --   --  209* 274*   AST  --   --  103* 132*   TROPI <0.015  --  <0.015  --      No results found for this or any previous visit (from the past 24 hour(s)).  Medications     HEParin 1,800 Units/hr (06/06/20 0827)     - MEDICATION INSTRUCTIONS -       - MEDICATION INSTRUCTIONS -         lidocaine  1 patch Transdermal Q24h    And     lidocaine   Transdermal Q8H     sodium chloride (PF)  3 mL Intracatheter Q8H     sodium chloride (PF)  3 mL Intracatheter Q8H

## 2020-06-06 NOTE — PROGRESS NOTES
"HEMATOLOGY CONSULT PROGRESS NOTE  06/06/20  7:53 AM      INTERVAL HISTORY:  Underwent mechanical thrombectomy of L popliteal, tibioperoneal, peroneal and posterior tibial arteries yesterday.  Continued overnight on heparin infusion.  Today is not having any pain.      A complete review of systems was performed and was negative with the exception of pertinent positives noted above.    OBJECTIVE DATA:  /85   Pulse 70   Temp 97.3  F (36.3  C) (Oral)   Resp 20   Ht 1.727 m (5' 8\")   Wt 125.5 kg (276 lb 10.8 oz)   SpO2 98%   BMI 42.07 kg/m    -- General: no acute distress  -- HEENT: normocephalic, atraumatic  -- Cardiovascular: no peripheral edema or JVD; BLE are warm and non-tender, pulses are strong  -- Pulmonary: breathing comfortably on room air  -- Gastrointestinal: abdomen soft, non-tender, non-distended  -- Musculoskeletal: no swelling or erythema of joints  -- Integumentary: no rashes  -- Neurologic: alert and oriented to self, place, time; no gross abnormalities  -- Psychiatric: appropriate affect, cooperative    I have independently reviewed the new laboratory results and imaging studies.    Assessment & recommendations:  Acute arterial thrombus provoked by COVID-19 infection, now s/p thrombectomy.    Would continue on heparin infusion overnight to ensure stable.  Will likely start apixaban tomorrow and he can discharge at that time if no acute events.    Patient seen and discussed with Dr. Rodriguze.    Martha Bonilla MD  Hematology-Oncology Fellow      "

## 2020-06-07 ENCOUNTER — PATIENT OUTREACH (OUTPATIENT)
Dept: CARE COORDINATION | Facility: CLINIC | Age: 50
End: 2020-06-07

## 2020-06-07 VITALS
SYSTOLIC BLOOD PRESSURE: 133 MMHG | BODY MASS INDEX: 41.93 KG/M2 | RESPIRATION RATE: 16 BRPM | HEIGHT: 68 IN | OXYGEN SATURATION: 93 % | TEMPERATURE: 96.4 F | WEIGHT: 276.68 LBS | HEART RATE: 67 BPM | DIASTOLIC BLOOD PRESSURE: 90 MMHG

## 2020-06-07 LAB
ANION GAP SERPL CALCULATED.3IONS-SCNC: 4 MMOL/L (ref 3–14)
BASOPHILS # BLD AUTO: 0.1 10E9/L (ref 0–0.2)
BASOPHILS NFR BLD AUTO: 0.8 %
BUN SERPL-MCNC: 9 MG/DL (ref 7–30)
CALCIUM SERPL-MCNC: 8.9 MG/DL (ref 8.5–10.1)
CHLORIDE SERPL-SCNC: 107 MMOL/L (ref 94–109)
CO2 SERPL-SCNC: 27 MMOL/L (ref 20–32)
CREAT SERPL-MCNC: 0.73 MG/DL (ref 0.66–1.25)
CRP SERPL-MCNC: 17 MG/L (ref 0–8)
D DIMER PPP FEU-MCNC: 1.4 UG/ML FEU (ref 0–0.5)
DIFFERENTIAL METHOD BLD: ABNORMAL
EOSINOPHIL # BLD AUTO: 0.2 10E9/L (ref 0–0.7)
EOSINOPHIL NFR BLD AUTO: 3 %
ERYTHROCYTE [DISTWIDTH] IN BLOOD BY AUTOMATED COUNT: 12.6 % (ref 10–15)
FIBRINOGEN PPP-MCNC: 565 MG/DL (ref 200–420)
GFR SERPL CREATININE-BSD FRML MDRD: >90 ML/MIN/{1.73_M2}
GLUCOSE SERPL-MCNC: 112 MG/DL (ref 70–99)
HCT VFR BLD AUTO: 35.3 % (ref 40–53)
HGB BLD-MCNC: 11.7 G/DL (ref 13.3–17.7)
IMM GRANULOCYTES # BLD: 0.2 10E9/L (ref 0–0.4)
IMM GRANULOCYTES NFR BLD: 2.3 %
INR PPP: 1.13 (ref 0.86–1.14)
LDH SERPL L TO P-CCNC: 236 U/L (ref 85–227)
LMWH PPP CHRO-ACNC: 0.47 IU/ML
LYMPHOCYTES # BLD AUTO: 2.2 10E9/L (ref 0.8–5.3)
LYMPHOCYTES NFR BLD AUTO: 31.1 %
MAGNESIUM SERPL-MCNC: 2.2 MG/DL (ref 1.6–2.3)
MCH RBC QN AUTO: 31.9 PG (ref 26.5–33)
MCHC RBC AUTO-ENTMCNC: 33.1 G/DL (ref 31.5–36.5)
MCV RBC AUTO: 96 FL (ref 78–100)
MONOCYTES # BLD AUTO: 0.6 10E9/L (ref 0–1.3)
MONOCYTES NFR BLD AUTO: 8.5 %
NEUTROPHILS # BLD AUTO: 3.8 10E9/L (ref 1.6–8.3)
NEUTROPHILS NFR BLD AUTO: 54.3 %
NRBC # BLD AUTO: 0 10*3/UL
NRBC BLD AUTO-RTO: 0 /100
PLATELET # BLD AUTO: 470 10E9/L (ref 150–450)
POTASSIUM SERPL-SCNC: 4.2 MMOL/L (ref 3.4–5.3)
RBC # BLD AUTO: 3.67 10E12/L (ref 4.4–5.9)
RETICS # AUTO: 89.9 10E9/L (ref 25–95)
RETICS/RBC NFR AUTO: 2.5 % (ref 0.5–2)
SODIUM SERPL-SCNC: 138 MMOL/L (ref 133–144)
WBC # BLD AUTO: 7.1 10E9/L (ref 4–11)

## 2020-06-07 PROCEDURE — 25000128 H RX IP 250 OP 636: Performed by: INTERNAL MEDICINE

## 2020-06-07 PROCEDURE — 25000132 ZZH RX MED GY IP 250 OP 250 PS 637: Performed by: HOSPITALIST

## 2020-06-07 PROCEDURE — 83615 LACTATE (LD) (LDH) ENZYME: CPT | Performed by: INTERNAL MEDICINE

## 2020-06-07 PROCEDURE — 85610 PROTHROMBIN TIME: CPT | Performed by: INTERNAL MEDICINE

## 2020-06-07 PROCEDURE — 85384 FIBRINOGEN ACTIVITY: CPT | Performed by: INTERNAL MEDICINE

## 2020-06-07 PROCEDURE — 85379 FIBRIN DEGRADATION QUANT: CPT | Performed by: INTERNAL MEDICINE

## 2020-06-07 PROCEDURE — 85045 AUTOMATED RETICULOCYTE COUNT: CPT | Performed by: INTERNAL MEDICINE

## 2020-06-07 PROCEDURE — 36415 COLL VENOUS BLD VENIPUNCTURE: CPT | Performed by: INTERNAL MEDICINE

## 2020-06-07 PROCEDURE — 83735 ASSAY OF MAGNESIUM: CPT | Performed by: INTERNAL MEDICINE

## 2020-06-07 PROCEDURE — 85025 COMPLETE CBC W/AUTO DIFF WBC: CPT | Performed by: INTERNAL MEDICINE

## 2020-06-07 PROCEDURE — 86140 C-REACTIVE PROTEIN: CPT | Performed by: INTERNAL MEDICINE

## 2020-06-07 PROCEDURE — 99239 HOSP IP/OBS DSCHRG MGMT >30: CPT | Performed by: HOSPITALIST

## 2020-06-07 PROCEDURE — 80048 BASIC METABOLIC PNL TOTAL CA: CPT | Performed by: INTERNAL MEDICINE

## 2020-06-07 PROCEDURE — 85520 HEPARIN ASSAY: CPT | Performed by: INTERNAL MEDICINE

## 2020-06-07 RX ADMIN — APIXABAN 10 MG: 5 TABLET, FILM COATED ORAL at 09:52

## 2020-06-07 RX ADMIN — HEPARIN SODIUM 1800 UNITS/HR: 10000 INJECTION, SOLUTION INTRAVENOUS at 00:47

## 2020-06-07 ASSESSMENT — ACTIVITIES OF DAILY LIVING (ADL)
ADLS_ACUITY_SCORE: 12

## 2020-06-07 NOTE — PROGRESS NOTES
[id number]: Pt A&O x4, calm and cooperative, complains of mild shoulder pain, no nausea, good appetite. VSS on RA. BLE are warm, no numbness or tingling and good capillary refill and pulses. Hep drip discontinued and pt started on Eliquis. Education completed and AVS in hand. Pt set to discharge home and has a ride arranged w his friend. Will follow-up in clinic.

## 2020-06-07 NOTE — PLAN OF CARE
A&O, VSS on RA PIV infusing heparin gtt 1800 units/hr, therapeutic 10a. C/o ,mild right shoulder pain. Lidocaine patch on, Tylenol given. Pt will discharge when transitions to oral anticoagulant. Calls to make needs known.

## 2020-06-07 NOTE — DISCHARGE SUMMARY
[facility], [place]  Hospitalist Discharge Summary      Date of Admission:  6/4/2020  Date of Discharge:  6/7/2020  Discharging Provider: Alexis Rico MD  Discharge Team: Hospitalist Service, Gold 6    Discharge Diagnoses     Acute L femoral and popliteal arterial occlusion, due to novel coronavirus infection  Novel Coronavirus infection with sequela and complication  HTN  DM2    Follow-ups Needed After Discharge   Follow-up Appointments     Adult [facility] Follow-up and recommended labs and tests      Follow up with primary care provider, Quoc Flynn MD, within   7 days for hospital follow- up.  No follow up labs or test are needed.    Additionally:  1. Follow up with Vascular Surgery in 4 weeks  2. Follow up with Hematology in 2 weeks      Appointments on [place] and/or [facility] (with [facility] or [facility]   provider or service). Call [phone] if you haven't heard regarding   these appointments within 7 days of discharge.         {Additional follow-up instructions/to-do's for PCP    :Distal Pulses, and Anticoagulation    Unresulted Labs Ordered in the Past 30 Days of this Admission     No orders found from 5/5/2020 to 6/5/2020.          Discharge Disposition   Discharged to home  Condition at discharge: Stable      Hospital Course     Admitted 6/4/2020 with acute LLE femoral/popliteal artery occlusion, and limb ischemia.  Started on IV Heparin drip  6/5 underwent successful IR suction thrombectomy and returned to IV heparin drip  6/6 continued IV heparin drip with good distal pulses  6/7 transitioned to PO Apixaban 10 mg BID for 7 days, then will be maintained at 5 mg BID for duration of 6 month therapy.    Will have close follow up with hematology and vascular surgery per dispo planning.    Pt educated about signs and warnings of recurrent limb ischemia and bleeding and to immediately report to ED should this occur.    Consultations This Hospital Stay   VASCULAR  SURGERY ADULT IP CONSULT  INTERVENTIONAL RADIOLOGY ADULT/PEDS IP CONSULT  HEMATOLOGY ADULT IP CONSULT    Code Status   Full Code    Time Spent on this Encounter   I, Alexis Rico MD, personally saw the patient today and spent greater than 30 minutes discharging this patient.       Alexis Rico MD  [facility], [place]  ______________________________________________________________________    Physical Exam   Vital Signs: Temp: 96.4  F (35.8  C) Temp src: Oral BP: (!) 133/90 Pulse: 67 Heart Rate: 80 Resp: 16 SpO2: 93 % O2 Device: None (Room air)    Weight: 276 lbs 10.84 oz    General: Alert awake and oriented, no distress, conversational  Eyes: Normal pink mucosa with no signs of pallor, no scleral icterus.  Neck: No significant lymphadenopathy, no palpable LN, No JVD  Heart: Regular rate and rhythm, normal S1, normal S2, no murmurs rubs or gallops, PMI is nondisplaced              Peripherally there is no edema              LLE: DP pulse-2+ good pulse, good coloration, intact senstaion.              RLE: Good DP pulse, coloration, intact sensation  Lungs: No increased work of breathing, good effort, lungs are clear to auscultation bilaterally              No wheezing or crackles              Breathing on room air  Abdomen: Nontender, nondistended, normal active bowel sounds, no palpable masses  Extremities: Normal capillary refill, no edema, no clubbing, no cyanosis  Neuro: Alert and oriented to person place location and situation              Grossly arms and legs are without any focal motor or sensory deficits              Gait was not assessed              Cranial nerves II through XII are grossly intact  Psych: No acute psychosis, good mood, good spirits       Primary Care Physician   Quoc Flynn MD    Discharge Orders      Reason for your hospital stay    Admitted with complication from novel COVID Virus; with L LEG ARTERIAL THROMBUS     Adult [facility] Follow-up and  recommended labs and tests    Follow up with primary care provider, Quoc Flynn MD, within 7 days for hospital follow- up.  No follow up labs or test are needed.    Additionally:  1. Follow up with Vascular Surgery in 4 weeks  2. Follow up with Hematology in 2 weeks      Appointments on [place] and/or [facility] (with [facility] or [facility] provider or service). Call [phone] if you haven't heard regarding these appointments within 7 days of discharge.     Activity    Your activity upon discharge: activity as tolerated     Discharge Instructions    Any bleeding or Leg signs of ischemia, report to the ED immediately     Full Code     Diet    Follow this diet upon discharge: Orders Placed This Encounter      Consistent Carbohydrate Diet 2438-2230 Calories: Moderate Consistent CHO (4-6 CHO units/meal)       Significant Results and Procedures   Results for orders placed or performed during the hospital encounter of 06/04/20   US Low Extremity Arterial Duplex Left Port    Narrative    EXAM: Duplex ultrasound of left lower extremity arteries dated  6/4/2020 5:50 PM     HISTORY: cold left foot     COMPARISON: None    TECHNIQUE: Grayscale (B-mode), color Doppler, and duplex spectral  Doppler ultrasound of the lower extremity arteries. Velocity  measurements obtained with angle correction of 60 degrees or less.    FINDINGS:     LEFT LOWER EXTREMITY:     Common femoral artery: Velocity: 100 cm/sec.   Profunda femoral artery: Velocity: 98 cm/sec.   Proximal SFA: Velocity: 98 cm/sec.   Mid SFA:Velocity:  89 cm/sec.   Distal SFA: Velocity: 39 cm/sec.     Popliteal artery, proximal: Velocity: 36 cm/sec.     PTA ankle: Velocity: 13 cm/sec.     WAVEFORMS/FINDINGS: Minimal flow in the posterior tibial artery.      Impression    IMPRESSION:   1. Doppler arterial ultrasound demonstrates post occlusive flow in the  left posterior tibial artery suggestive of significant infrapopliteal  disease in this vessel.      SHARITA  MD JAMES   US Lower Extremity Venous Duplex Left    Narrative    US LEFT LOWER EXTREMITY VENOUS DUPLEX ULTRASOUND  6/4/2020 9:41 PM    CLINICAL HISTORY: Left lower extremity arterial vascular  insufficiency. Cold left foot.    TECHNIQUE: Venous Duplex ultrasound of the left lower extremity with  and without compression, augmentation and duplex. Color flow and  spectral Doppler with waveform analysis performed.    COMPARISON: None.    FINDINGS: Exam includes the common femoral, femoral, popliteal, and  contralateral common femoral veins as well as segmentally visualized  deep calf veins and greater saphenous vein.     LEFT: No deep vein thrombosis. No superficial thrombophlebitis. No  popliteal cyst.      Impression    IMPRESSION: No deep venous thrombosis in the left lower extremity.    JORGE GRAVES MD   CTA Abdomen Pelvis Bilat Leg Runoff w Contr     Value    Radiologist flags Liver lesion    Narrative    Exam: Computed tomographic angiography of the abdomen, pelvis, and  bilateral lower extremities with contrast dated ] 6/5/2020 5:32 AM     HISTORY: Covid positive patient with acute onset left leg pain,  coolness and diminished pulses.    Technique: Axial images obtained of the abdomen, pelvis, and lower  extremities through the feet obtained following the injection of  contrast media in the arterial phase. Source images reviewed as well  as 3D and multi-planar reconstructions.    Contrast: 150 ml isovue 370     DLP: 3195 mGy*cm    Comparison: Arterial Doppler ultrasound, 6/4/2020 .    FINDINGS:      Abdominal aorta:    Patent, no aneurysm or evidence of dissection. Widely patent celiac  trunk, SMA, HAYLEY and bilateral renal arteries.    Right pelvis and lower extremity:    Common iliac artery: Widely patent  External iliac artery: Widely patent   Common femoral artery: Widely patent   Profunda femoral artery: Widely patent  Superficial femoral artery: Widely patent  Popliteal artery: Widely patent  Tibioperoneal  trunk: Widely patent  Anterior tibial artery: Diminutive, intermittently opacified, delayed  phase through the calves was not obtained to evaluate this further.  Posterior: Widely patent  Peroneal: Widely patent    Left pelvis and lower extremity:    Common iliac artery: Widely patent  External iliac artery: Widely patent   Common femoral artery: Widely patent   Profunda femoral artery: The origin is patent. Occlusive  thromboembolus is present from its proximal portion extending into the  intramuscular branches eventually reconstituted. No collaterals are  present.  Superficial femoral artery: Widely patent  Popliteal artery: Proximal and midportion are widely patent. Near  occlusive thromboembolus in the distal portion below the knee joint,  extending into the proximal tibial arteries. No collaterals are seen.  Tibioperoneal trunk: Extension of popliteal thromboembolus through the  tibioperoneal trunk.  Anterior tibial artery: Extension of popliteal thromboembolus into the  BRODIE origin. Remainder BRODIE is intermittently opacified, though delayed  scan through the calves was not obtained and therefore it is difficult  to ascertain whether this is due to lack of flow versus intermittent  occlusion versus pre-existing diminutive size of the vessel, the  latter a possibility since the right BRODIE is also diminutive.  Posterior: Extension of tibioperoneal thromboembolus into the PTA  origin. Remainder is patent to the foot.  Peroneal: Extension of tibioperoneal thromboembolus into the proximal  peroneal artery. Remainder is patent to the foot.    Abdomen: 15 mm hypodense lesion laterally in segment 6 of the liver  (series 7 image 32, series 8 image 186). Spleen, gallbladder,  pancreas, adrenal glands, kidneys appear normal. The stomach, small  and colon appear normal. Normal appendix. No mesenteric or  retroperitoneal lymphadenopathy. Pelvic organs and urinary bladder are  unremarkable. Small bilateral hydroceles in the  scrotum.     Chest: Multifocal predominantly peripheral groundglass consolidative  changes in both lungs associated with intralobular septal thickening.  No pleural effusion.     Bones and soft tissues: No aggressive appearing osseous lesions. Mild  degenerative changes in the lumbar spine, hip joints and knee joints.  Small left knee joint effusion in suprapatellar pouch, and trace right  knee joint effusion in suprapatellar pouch.      Impression    IMPRESSION:    1. Abdominal aorta: No aneurysm or evidence of abdominal aortic  dissection.  2. Right lower extremity: Widely patent arteries, though BRODIE is  diminutive.  3. Left lower extremity: Acute appearing thromboembolism in the  proximal profunda femoris artery, as well as the distal popliteal  artery extending into the proximal tibial arteries.  4. Multifocal groundglass opacities in the visualized lung bases, most  compatible with viral pneumonia from known Covid-19 infection.  5. 1.5 cm hypodense liver lesion, indeterminate - recommend liver MRI  for further evaluation.  6. Small bilateral knee joint effusions.        [Consider Follow Up: Liver lesion]    This report will be copied to the [facility] to ensure a  provider acknowledges the finding.     I have personally reviewed the examination and initial interpretation  and I agree with the findings.    NASIR FIGUEROA MD   Echo Complete    Narrative    [id number]  [id number]  [id number]  [id number]^JASON^TERESE^ADRIAN           [facility],[place]  Echocardiography Laboratory  [address] [place] [id number]     Name: ERNIE SHULTZ  MRN: [id number]  : 1970  Study Date: 2020 10:43 AM  Age: 49 yrs  Gender: Male  Patient Location: [facility]  Reason For Study: Arterial Embolism and Thrombosis  Ordering Physician: TERESE GOMEZ  Performed By: Bambi Contreras RDCS     BSA: 2.3 m2  Height: 68 in  Weight: 276 lb  HR: 74  BP: 145/87  mmHg  _____________________________________________________________________________  __        Procedure  Echocardiogram with two-dimensional, color and spectral Doppler performed.  Contrast Definity. Technically difficult study.Extremely poor acoustic  windows. Limited information was obtained during study. Definity (NDC #38587-  011-16) given intravenously. Patient was given 5ml mixture of 1.5ml Definity  and 8.5ml saline. 5 ml wasted.  _____________________________________________________________________________  __        Interpretation Summary  Global and regional left ventricular function is normal with an EF of 55-60%.  Technically difficult study.Extremely poor acoustic windows.  Limited information was obtained during study.  Mild right ventricular dilation is present.  Global right ventricular function is normal.  Pulmonary artery systolic pressure cannot be assessed.  The inferior vena cava cannot be assessed.  No pericardial effusion is present.  _____________________________________________________________________________  __        Left Ventricle  Global and regional left ventricular function is normal with an EF of 55-60%.  Left ventricular wall thickness is normal. Left ventricular size is normal.  Left ventricular diastolic function is not assessable. No regional wall motion  abnormalities are seen.     Right Ventricle  Global right ventricular function is normal. Mild right ventricular dilation  is present.     Atria  Both atria appear normal.     Mitral Valve  The mitral valve is normal.        Aortic Valve  Aortic valve is normal in structure and function.     Tricuspid Valve  Trace to mild tricuspid insufficiency is present. Pulmonary artery systolic  pressure cannot be assessed.     Pulmonic Valve  The pulmonic valve is normal.     Vessels  The thoracic aorta is normal. The pulmonary artery and bifurcation cannot be  assessed. The inferior vena cava cannot be assessed.     Pericardium  No  pericardial effusion is present.        Compared to Previous Study  Previous study not available for comparison.  _____________________________________________________________________________  __  MMode/2D Measurements & Calculations     IVSd: 1.1 cm  LVIDd: 4.8 cm  LVIDs: 3.0 cm  LVPWd: 1.1 cm  FS: 38.3 %  LV mass(C)d: 190.9 grams  LV mass(C)dI: 81.5 grams/m2  asc Aorta Diam: 3.0 cm  LVOT diam: 2.2 cm  LVOT area: 3.8 cm2  LA Volume (BP): 70.6 ml  LA Volume Index (BP): 30.2 ml/m2  RWT: 0.45           Doppler Measurements & Calculations  MV E max angeles: 76.6 cm/sec  MV A max angeles: 59.7 cm/sec  MV E/A: 1.3  MV dec slope: 351.0 cm/sec2  PA acc time: 0.14 sec  E/E' av.3  Lateral E/e': 7.7  Medial E/e': 9.0     _____________________________________________________________________________  __           Report approved by: [employer] 2020 11:43 AM            Discharge Medications   Current Discharge Medication List      START taking these medications    Details   apixaban ANTICOAGULANT (ELIQUIS) 5 MG tablet Take 1 tablet (5 mg) by mouth 2 times daily 6/7 for 7 days Take 2 tabs (10 mg) BID, then reduce to 5 mg BID.  Qty: 60 tablet, Refills: 3    Associated Diagnoses: Femoral popliteal artery thrombus (H)         CONTINUE these medications which have NOT CHANGED    Details   acetaminophen (TYLENOL) 325 MG tablet Take 2 tablets (650 mg) by mouth every 4 hours as needed for mild pain  Qty: 100 tablet, Refills: 0    Associated Diagnoses: Cellulitis of left lower extremity      lisinopril (ZESTRIL) 5 MG tablet TAKE 1 TABLET BY MOUTH ONCE DAILY  Qty: 90 tablet, Refills: 0    Associated Diagnoses: Hypertension goal BP (blood pressure) < 140/90      metFORMIN (GLUCOPHAGE) 500 MG tablet TAKE 1 TABLET BY MOUTH ONCE DAILY WITH DINNER  Qty: 30 tablet, Refills: 0    Associated Diagnoses: Type 2 diabetes mellitus without complication, without long-term current use of insulin (H)         STOP taking these medications        azithromycin (ZITHROMAX Z-MINDY) 250 MG tablet Comments:   Reason for Stopping:         cyclobenzaprine (FLEXERIL) 5 MG tablet Comments:   Reason for Stopping:         ofloxacin (FLOXIN) 0.3 % otic solution Comments:   Reason for Stopping:         traMADol (ULTRAM) 50 MG tablet Comments:   Reason for Stopping:             Allergies   Allergies   Allergen Reactions     Atorvastatin      myalgia     Bactrim [Sulfamethoxazole W/Trimethoprim]      hives

## 2020-06-07 NOTE — PROGRESS NOTES
"Vascular Surgery Progress Note:     Subjective:  Feels much improved.  Walking and no pain with walking.  No pain in the left lower extremity at rest either.  No weakness of the left LE.  Less fatigued today.     BP (!) 133/90   Pulse 67   Temp 96.4  F (35.8  C) (Oral)   Resp 16   Ht 1.727 m (5' 8\")   Wt 125.5 kg (276 lb 10.8 oz)   SpO2 93%   BMI 42.07 kg/m       Gen: NAD  Heart: reg rate to 60s  Lungs: non-labored breathing  Abd: soft, non-distended, no TTP  Extremities: spontaneously moving all 4 extremities to command  Right groin puncture site: soft, without hematoma  Left foot: good 5/5 dorsi-flexion and plantar flexion and sensation intact to light touch  Palpable L DP  Palpable R DP  R DP is stronger than L DP on palpation  L LE calf is very soft and not tense     Assessment:  Likely arterial occlusion due to hypercoaguable state from COVID19 with occlusion of popliteal artery on the left LE     Now status post IR procedure with mechanical suction thrombectomy with good result     Will need to be on anti-coagulation, agree with hep gtt for now; defer to hematology on timing and selection of transition to oral agent and long term duration, would likely do anticoagulation for at least 6 months from our perspective, anticipate he will switch to orals today per hematology note     Can follow up with vascular surgery in around 4 weeks with ABIs and arterial duplex of the left LE at that time     Vascular Surgery will continue to be available for questions or concerns     Luke Pesonen  Vascular Surgery Fellow  "

## 2020-06-07 NOTE — PLAN OF CARE
Pt A&O. VSS on RA. Up independently. C/o right shoulder pain, some relief with lidocaine patch. Pedal pulses palpable, right is stronger than left. Heparin gtt infusing at 1800 units/hr, 10a draw this am is therapeutic. No BM, voiding. Plan for discharge home pending bridge to oral anti-coagulation.

## 2020-06-08 ENCOUNTER — PATIENT OUTREACH (OUTPATIENT)
Dept: CARE COORDINATION | Facility: CLINIC | Age: 50
End: 2020-06-08

## 2020-06-08 DIAGNOSIS — I82.90 THROMBUS: Primary | ICD-10-CM

## 2020-06-08 NOTE — PROGRESS NOTES
Clinic Care Coordination Contact  [facility]/Voicemail    Clinical Data: Care Coordinator Outreach  Outreach attempted x 1.  Left message on patient's voicemail with call back information and requested return call.  Plan: Care Coordinator will try to reach patient again in 1-2 business days.

## 2020-06-08 NOTE — PROGRESS NOTES
Patient was contacted by an RN for post DC follow up so no duplicate post DC follow up call will be made    Dori Thomas CMA   Post Hospital Discharge Team

## 2020-06-09 ENCOUNTER — VIRTUAL VISIT (OUTPATIENT)
Dept: FAMILY MEDICINE | Facility: CLINIC | Age: 50
End: 2020-06-09
Payer: COMMERCIAL

## 2020-06-09 ENCOUNTER — TELEPHONE (OUTPATIENT)
Dept: FAMILY MEDICINE | Facility: CLINIC | Age: 50
End: 2020-06-09

## 2020-06-09 DIAGNOSIS — J12.9 VIRAL PNEUMONIA: ICD-10-CM

## 2020-06-09 DIAGNOSIS — K76.9 LESION OF LIVER GREATER THAN 1 CM IN DIAMETER WITH NO LIVER DISEASE OR HISTORY OF MALIGNANT NEOPLASM: Primary | ICD-10-CM

## 2020-06-09 DIAGNOSIS — J98.01 ACUTE BRONCHOSPASM: ICD-10-CM

## 2020-06-09 DIAGNOSIS — U07.1 2019 NOVEL CORONAVIRUS DISEASE (COVID-19): ICD-10-CM

## 2020-06-09 PROCEDURE — 99214 OFFICE O/P EST MOD 30 MIN: CPT | Mod: 95 | Performed by: PHYSICIAN ASSISTANT

## 2020-06-09 RX ORDER — ALBUTEROL SULFATE 90 UG/1
2 AEROSOL, METERED RESPIRATORY (INHALATION) EVERY 6 HOURS
Qty: 1 INHALER | Refills: 0 | Status: SHIPPED | OUTPATIENT
Start: 2020-06-09 | End: 2021-04-20

## 2020-06-09 NOTE — TELEPHONE ENCOUNTER
Spoke with patient regarding inhaler medications and pneumonia diagnosis. Educated to avoid others for first couple days after treatment and until feeling better, and to wash hands and surfaces frequently. Verbalized understanding    Lauren Bedoya RN   [facility]

## 2020-06-09 NOTE — LETTER
[address] [address]  Phone: [phone]  Fax: [phone]    June 9, 2020        Aaron Mercado  [address] [place] [address]          To whom it may concern:    RE: Aaron Mercado    Patient was treated today at our clinic and will need to remain off work until June 16th due to illness.     Please contact me for questions or concerns.      Sincerely,        María Carl PA-C

## 2020-06-09 NOTE — PROGRESS NOTES
"Aaron Mercado is a 49 year old male who is being evaluated via a billable telephone visit.      The patient has been notified of following:     \"This telephone visit will be conducted via a call between you and your physician/provider. We have found that certain health care needs can be provided without the need for a physical exam.  This service lets us provide the care you need with a short phone conversation.  If a prescription is necessary we can send it directly to your pharmacy.  If lab work is needed we can place an order for that and you can then stop by our lab to have the test done at a later time.    Telephone visits are billed at different rates depending on your insurance coverage. During this emergency period, for some insurers they may be billed the same as an in-person visit.  Please reach out to your insurance provider with any questions.    If during the course of the call the physician/provider feels a telephone visit is not appropriate, you will not be charged for this service.\"    Patient has given verbal consent for Telephone visit?  Yes    What phone number would you like to be contacted at? [phone]    How would you like to obtain your AVS? Mail a copy with work note, print off negative test     Subjective     Aaron Mercado is a 49 year old male who presents via phone visit today for the following health issues:    Hospitals in [place]     Hospital f/u recent COOVID-19    How are you feeling today? feels better but still has SOB after just 100ft, IV site infection? note for work   In the past 24 hours have you had shortness of breath when speaking, walking, or climbing stairs? My breathing issues have stayed the same  Do you have a cough? I don't have a cough  When is the last time you had a fever greater than 100? In hospital  Are you having any other symptoms? Wrist bruise, DVT leg   Do you have any other stressors you would like to discuss with your provider? Job Concerns needs note to return and " "copy of negative test when released from hosp    {Rooming staff  Consult with provider and complete PHQ if directed :786711}  {Rooming staff  Consult with provider and complete GAD7 if directed :043314}    {Results-PHQ, GAD7, PTSD Screenings Completed Today(Optional):574808}    {Provider  Link to COVID SmartSet- Resources, Orders, Pt Instructions, Follow up and LOS :828915}             {additonal problems for provider to add (Optional):790593}    {HIST REVIEW/ LINKS 2 (Optional):258390}    Reviewed and updated as needed this visit by Provider         Review of Systems   {ROS COMP (Optional):756169}       Objective   Reported vitals:  There were no vitals taken for this visit.   {GENERAL APPEARANCE:50::\"healthy\",\"alert\",\"no distress\"}  PSYCH: Alert and oriented times 3; coherent speech, normal   rate and volume, able to articulate logical thoughts, able   to abstract reason, no tangential thoughts, no hallucinations   or delusions  His affect is { :1842440::\"normal\"}  RESP: No cough, no audible wheezing, able to talk in full sentences  Remainder of exam unable to be completed due to telephone visits    {Diagnostic Test Results (Optional):747003::\"Diagnostic Test Results:\",\"Labs reviewed in Epic\"}        Assessment/Plan:  {Diagnosis, Associated Orders and Comment:199596}    No follow-ups on file.      Phone call duration:  *** minutes    {signature options:226842}      "

## 2020-06-09 NOTE — PROGRESS NOTES
"Aaron Mercado is a 49 year old male who is being evaluated via a billable video visit.      The patient has been notified of following:     \"This video visit will be conducted via a call between you and your physician/provider. We have found that certain health care needs can be provided without the need for an in-person physical exam.  This service lets us provide the care you need with a video conversation.  If a prescription is necessary we can send it directly to your pharmacy.  If lab work is needed we can place an order for that and you can then stop by our lab to have the test done at a later time.    Video visits are billed at different rates depending on your insurance coverage.  Please reach out to your insurance provider with any questions.    If during the course of the call the physician/provider feels a video visit is not appropriate, you will not be charged for this service.\"    Patient has given verbal consent for Video visit? Yes    How would you like to obtain your AVS? Levi    Patient would like the video invitation sent by: Text to cell phone: see carola    Will anyone else be joining your video visit? No    Subjective     Aaron Mercado is a 49 year old male who presents today via video visit for the following health issues:    HPI   Concerned about possible infection over where IV was placed  Hospital f/u recent COOVID-19     How are you feeling today? feels better but still has SOB after just 100ft, IV site infection? note for work   In the past 24 hours have you had shortness of breath when speaking, walking, or climbing stairs? My breathing issues have stayed the same  Do you have a cough? I don't have a cough  When is the last time you had a fever greater than 100? In hospital on 6/4/20  Are you having any other symptoms? Wrist bruise, DVT leg   Do you have any other stressors you would like to discuss with your provider? Job Concerns needs note to return and copy of negative test when " "released from hospital    Pain in the chest while breathing has stayed the same since he was in the hospital    He was able to sleep through the entire night last night for the first time    Tested negative for COVID this last hospital stay and was told he could go back to work anytime now  Feels completely \"gassed\" after barely walking anywhere  Very low energy  Coughs with taking a deep breath           Video Start Time: 8:45 am        Patient Active Problem List   Diagnosis     obese bmi over 35     Genital warts     Eczema     Type 2 diabetes mellitus without complication (H)     Hypertension goal BP (blood pressure) < 140/90     Hyperlipidemia with target LDL less than 100     Major depressive disorder, recurrent episode, moderate (H)     Cellulitis     Acute bilateral low back pain without sciatica     Thrombus     Femoral popliteal artery thrombus (H)     Past Surgical History:   Procedure Laterality Date     C ORAL SURGERY PROCEDURE  2000    [place] teeth extraction     IR LOWER EXTREMITY ANGIOGRAM LEFT  6/5/2020     LASIK  2003       Social History     Tobacco Use     Smoking status: Never Smoker     Smokeless tobacco: Never Used     Tobacco comment: Non smoke home   Substance Use Topics     Alcohol use: Yes     Comment: 1-2 nights out during the week     Family History   Problem Relation Age of Onset     Diabetes Mother      Family History Negative Father      Family History Negative Sister      Family History Negative Sister      Family History Negative Brother      Family History Negative Brother      Family History Negative Brother      Family History Negative Sister      Family History Negative Son          Current Outpatient Medications   Medication Sig Dispense Refill     acetaminophen (TYLENOL) 325 MG tablet Take 2 tablets (650 mg) by mouth every 4 hours as needed for mild pain 100 tablet 0     albuterol (PROAIR HFA/PROVENTIL HFA/VENTOLIN HFA) 108 (90 Base) MCG/ACT inhaler Inhale 2 puffs into the " lungs every 6 hours 1 Inhaler 0     apixaban ANTICOAGULANT (ELIQUIS) 5 MG tablet Take 1 tablet (5 mg) by mouth 2 times daily 6/7 for 7 days Take 2 tabs (10 mg) BID, then reduce to 5 mg BID. 60 tablet 3     fluticasone-salmeterol (ADVAIR) 100-50 MCG/DOSE inhaler Inhale 1 puff into the lungs every 12 hours for 14 days 1 Inhaler 0     lisinopril (ZESTRIL) 5 MG tablet TAKE 1 TABLET BY MOUTH ONCE DAILY 90 tablet 0     metFORMIN (GLUCOPHAGE) 500 MG tablet TAKE 1 TABLET BY MOUTH ONCE DAILY WITH DINNER 30 tablet 0     Recent Labs   Lab Test 06/07/20  0423 06/05/20  1650 06/05/20  0512 06/04/20  1610  10/29/19  1849 12/04/18  0915  11/18/18  2031  06/13/18  1020 05/31/18  1204 02/08/18  0840 12/14/16  1620 05/20/15  1030   A1C  --   --   --   --   --  5.9*  --   --  5.0  --  5.1  --  5.4 5.5 5.5   LDL  --   --   --   --   --   --   --   --   --   --   --   --  132* 135* 122   HDL  --   --   --   --   --   --   --   --   --   --   --   --  40 39* 32*   TRIG  --   --   --   --   --   --   --   --   --   --   --   --  110 95 80   ALT  --   --  209* 274*  --   --  51  --   --   --   --  48  --  41 41   CR 0.73  --  0.71 0.64*   < > 0.66 0.72   < > 0.77   < >  --  0.75 0.69 0.84 0.79   GFRESTIMATED >90  --  >90 >90   < > >90 >90   < > >90   < >  --  >90 >90 >90  Non  GFR Calc   >90  Non  GFR Calc     GFRESTBLACK >90  --  >90 >90   < > >90 >90   < > >90   < >  --  >90 >90 >90   GFR Calc   >90   GFR Calc     POTASSIUM 4.2 3.9 4.1 3.6   < > 4.1 3.9   < >  --    < >  --  4.2 4.1 3.8 4.0   TSH  --   --   --   --   --   --   --   --   --   --   --  1.14 2.23  --  1.36    < > = values in this interval not displayed.      BP Readings from Last 3 Encounters:   06/07/20 (!) 133/90   05/26/20 126/73   10/29/19 (!) 146/84    Wt Readings from Last 3 Encounters:   06/05/20 125.5 kg (276 lb 10.8 oz)   10/29/19 125.6 kg (277 lb)   01/29/19 119.4 kg (263 lb 4 oz)                 "    Reviewed and updated as needed this visit by Provider         Review of Systems   CONSTITUTIONAL: NEGATIVE for fever, chills, change in weight  INTEGUMENTARY/SKIN: NEGATIVE for worrisome rashes, moles or lesions  EYES: NEGATIVE for vision changes or irritation  ENT/MOUTH: NEGATIVE for ear, mouth and throat problems  RESP:NEGATIVE for hemoptysis  CV: NEGATIVE for irregular heart beat and syncope or near-syncope  GI: NEGATIVE for nausea, abdominal pain, heartburn, or change in bowel habits  : NEGATIVE for frequency, dysuria, or hematuria  MUSCULOSKELETAL: NEGATIVE for significant arthralgias or myalgia  NEURO: NEGATIVE for paralysis  and paresthesias   ENDOCRINE: NEGATIVE for temperature intolerance, skin/hair changes  PSYCHIATRIC: NEGATIVE for changes in mood or affect      Objective    There were no vitals taken for this visit.  Estimated body mass index is 42.07 kg/m  as calculated from the following:    Height as of 6/5/20: 1.727 m (5' 8\").    Weight as of 6/5/20: 125.5 kg (276 lb 10.8 oz).  Physical Exam     GENERAL: Healthy, alert and no distress  EYES: Eyes grossly normal to inspection.  No discharge or erythema, or obvious scleral/conjunctival abnormalities.  RESP: No audible wheeze, cough, or visible cyanosis.  No visible retractions or increased work of breathing.    SKIN: Visible skin clear. Left antecubital fossa with about a 1 cm area of mild swelling and redness. No discharge or bleeding  NEURO: Cranial nerves grossly intact.  Mentation and speech appropriate for age.  PSYCH: Mentation appears normal, affect normal/bright, judgement and insight intact, normal speech and appearance well-groomed.                ICD-10-CM    1. Lesion of liver greater than 1 cm in diameter with no liver disease or history of malignant neoplasm  K76.9 MR Abdomen w/o & w Contrast   2. Acute bronchospasm  J98.01 albuterol (PROAIR HFA/PROVENTIL HFA/VENTOLIN HFA) 108 (90 Base) MCG/ACT inhaler   3. 2019 novel coronavirus " disease (COVID-19)  U07.1 fluticasone-salmeterol (ADVAIR) 100-50 MCG/DOSE inhaler   4. Viral pneumonia  J12.9 albuterol (PROAIR HFA/PROVENTIL HFA/VENTOLIN HFA) 108 (90 Base) MCG/ACT inhaler     fluticasone-salmeterol (ADVAIR) 100-50 MCG/DOSE inhaler     Still recovering from viral pneumonia caused by COVID 19 which is causing bronchospasms and shortness of breath. He'll start albuterol and advair as directed. 1 week off work and will reevaluate next week if he needs more time off. The IV site looks like it's healing just fine. Apply antibiotic ointment to the area daily until healed. Follow up on indeterminate liver mass on CT within a month or so. Return to clinic for any new or worsening symptoms or go to ER Urgent care in off hours        Video-Visit Details    Type of service:  Video Visit    Video End Time:9:05 am    Originating Location (pt. Location): Home    Distant Location (provider location):  [facility] [place]     Platform used for Video Visit: Doximity    Return in about 1 week (around 6/16/2020) for Video Visit.       María Carl PA-C

## 2020-06-09 NOTE — TELEPHONE ENCOUNTER
Reason for call:  Other   Patient called regarding (reason for call): call back  Additional comments: Patient had virtual visit with KS this morning and has some remaining questions about his pneumonia diagnosis (ie. is he contagious, etc.)    Phone number to reach patient:  Cell number on file:    Telephone Information:   Mobile [phone]       Best Time:  N/A    Can we leave a detailed message on this number?  He said yes but wasn't sure if his VM was even set up    Travel screening: Positive: unable to schedule an appointment, due to positive travel screen.  Informed patient/caller that a message will be sent to the clinic; who will then call them back to discuss next steps.     Patient screened positive for: OtherN/A

## 2020-06-09 NOTE — PATIENT INSTRUCTIONS
Call Javon at  to ask about picking up negative COVID results  Ask javon for number for imaging to schedule MRI   medications  Start advair twice daily for 2 weeks  Use albuterol as needed  Schedule follow up with PCP or myself in 1 week  Neosporin to arm twice daily  Return to clinic for any new or worsening symptoms or go to ER Urgent care in off hours

## 2020-06-09 NOTE — PROGRESS NOTES
Clinic Care Coordination Contact  Community Health Worker Initial Outreach    CHW Initial Information Gathering:  Referral Source: ED Follow-Up    Patient accepts CC: No. Pt said he has too much going on right now and took down my contact information. Pt said he will think about it and call back. Pt had questions about the inhalers prescribed for him this morning during his virtual visit. Offered to have someone from the clinic call him back to clarify, and he agreed. Gave pt examples of things we can assist with. Pt thanked me for the call and said he will be in touch when he is ready.

## 2020-06-15 NOTE — PROGRESS NOTES
"Aaron Mercado is a 49 year old male who is being evaluated via a billable telephone visit.      The patient has been notified of following:     \"This telephone visit will be conducted via a call between you and your physician/provider. We have found that certain health care needs can be provided without the need for a physical exam.  This service lets us provide the care you need with a short phone conversation.  If a prescription is necessary we can send it directly to your pharmacy.  If lab work is needed we can place an order for that and you can then stop by our lab to have the test done at a later time.    Telephone visits are billed at different rates depending on your insurance coverage. During this emergency period, for some insurers they may be billed the same as an in-person visit.  Please reach out to your insurance provider with any questions.    If during the course of the call the physician/provider feels a telephone visit is not appropriate, you will not be charged for this service.\"    Patient has given verbal consent for Telephone visit?  Yes    What phone number would you like to be contacted at? [phone]    How would you like to obtain your AVS? Mail a copy    Subjective     Aaron Mercado is a 49 year old male who presents via phone visit today for the following health issues:    HPI     Pneumonia follow up , slightly better but not quite there   Still feels winded. Now can walk about 50 yards compared to last week where he could barely walk  No new symptoms  Still with a cough.  Doesn't feel up to strenuous work    Still taking Eliquis as directed. Down to 5 mg twice daily  Has specialist appointments set up          Patient Active Problem List   Diagnosis     obese bmi over 35     Genital warts     Eczema     Type 2 diabetes mellitus without complication (H)     Hypertension goal BP (blood pressure) < 140/90     Hyperlipidemia with target LDL less than 100     Major depressive disorder, " recurrent episode, moderate (H)     Cellulitis     Acute bilateral low back pain without sciatica     Thrombus     Femoral popliteal artery thrombus (H)     Pneumonia due to COVID-19 virus     Past Surgical History:   Procedure Laterality Date     C ORAL SURGERY PROCEDURE  2000    [place] teeth extraction     IR LOWER EXTREMITY ANGIOGRAM LEFT  6/5/2020     IR MECH THROM PRIM ART, INIL VESSEL (PERIPHERAL)  6/5/2020     LASIK  2003       Social History     Tobacco Use     Smoking status: Never Smoker     Smokeless tobacco: Never Used     Tobacco comment: Non smoke home   Substance Use Topics     Alcohol use: Yes     Comment: 1-2 nights out during the week     Family History   Problem Relation Age of Onset     Diabetes Mother      Family History Negative Father      Family History Negative Sister      Family History Negative Sister      Family History Negative Brother      Family History Negative Brother      Family History Negative Brother      Family History Negative Sister      Family History Negative Son          Current Outpatient Medications   Medication Sig Dispense Refill     acetaminophen (TYLENOL) 325 MG tablet Take 2 tablets (650 mg) by mouth every 4 hours as needed for mild pain 100 tablet 0     albuterol (PROAIR HFA/PROVENTIL HFA/VENTOLIN HFA) 108 (90 Base) MCG/ACT inhaler Inhale 2 puffs into the lungs every 6 hours 1 Inhaler 0     apixaban ANTICOAGULANT (ELIQUIS) 5 MG tablet Take 1 tablet (5 mg) by mouth 2 times daily 6/7 for 7 days Take 2 tabs (10 mg) BID, then reduce to 5 mg BID. 60 tablet 3     benzonatate (TESSALON) 200 MG capsule Take 1 capsule (200 mg) by mouth 3 times daily as needed for cough 60 capsule 2     fluticasone-salmeterol (ADVAIR) 100-50 MCG/DOSE inhaler Inhale 1 puff into the lungs every 12 hours for 14 days 1 Inhaler 0     lisinopril (ZESTRIL) 5 MG tablet TAKE 1 TABLET BY MOUTH ONCE DAILY 90 tablet 0     metFORMIN (GLUCOPHAGE) 500 MG tablet TAKE 1 TABLET BY MOUTH ONCE DAILY WITH DINNER  30 tablet 0     Allergies   Allergen Reactions     Atorvastatin      myalgia     Bactrim [Sulfamethoxazole W/Trimethoprim]      hives     Recent Labs   Lab Test 06/07/20  0423 06/05/20  1650 06/05/20  0512 06/04/20  1610  10/29/19  1849 12/04/18  0915  11/18/18  2031  06/13/18  1020 05/31/18  1204 02/08/18  0840 12/14/16  1620 05/20/15  1030   A1C  --   --   --   --   --  5.9*  --   --  5.0  --  5.1  --  5.4 5.5 5.5   LDL  --   --   --   --   --   --   --   --   --   --   --   --  132* 135* 122   HDL  --   --   --   --   --   --   --   --   --   --   --   --  40 39* 32*   TRIG  --   --   --   --   --   --   --   --   --   --   --   --  110 95 80   ALT  --   --  209* 274*  --   --  51  --   --   --   --  48  --  41 41   CR 0.73  --  0.71 0.64*   < > 0.66 0.72   < > 0.77   < >  --  0.75 0.69 0.84 0.79   GFRESTIMATED >90  --  >90 >90   < > >90 >90   < > >90   < >  --  >90 >90 >90  Non  GFR Calc   >90  Non  GFR Calc     GFRESTBLACK >90  --  >90 >90   < > >90 >90   < > >90   < >  --  >90 >90 >90   GFR Calc   >90   GFR Calc     POTASSIUM 4.2 3.9 4.1 3.6   < > 4.1 3.9   < >  --    < >  --  4.2 4.1 3.8 4.0   TSH  --   --   --   --   --   --   --   --   --   --   --  1.14 2.23  --  1.36    < > = values in this interval not displayed.      BP Readings from Last 3 Encounters:   06/07/20 (!) 133/90   05/26/20 126/73   10/29/19 (!) 146/84    Wt Readings from Last 3 Encounters:   06/05/20 125.5 kg (276 lb 10.8 oz)   10/29/19 125.6 kg (277 lb)   01/29/19 119.4 kg (263 lb 4 oz)                    Reviewed and updated as needed this visit by Provider         Review of Systems   CONSTITUTIONAL: NEGATIVE for fever, chills, change in weight  ENT/MOUTH: NEGATIVE for ear, mouth and throat problems  RESP:NEGATIVE for hemoptysis, pleurisy and wheezing  CV: NEGATIVE for chest pain, palpitations or peripheral edema  GI: NEGATIVE for nausea, abdominal pain, heartburn, or change  in bowel habits  MUSCULOSKELETAL: NEGATIVE for significant arthralgias or myalgia  NEURO: NEGATIVE for weakness, dizziness or paresthesias  PSYCHIATRIC: NEGATIVE for changes in mood or affect       Objective   Reported vitals:  There were no vitals taken for this visit.   healthy, alert and no distress  PSYCH: Alert and oriented times 3; coherent speech, normal   rate and volume, able to articulate logical thoughts, able   to abstract reason, no tangential thoughts, no hallucinations   or delusions  His affect is normal  RESP: No cough, no audible wheezing, able to talk in full sentences  Remainder of exam unable to be completed due to telephone visits    Diagnostic Test Results:  Labs reviewed in Epic        Assessment/Plan:    ICD-10-CM    1. 2019 novel coronavirus disease (COVID-19)  U07.1    2. Cough  R05 benzonatate (TESSALON) 200 MG capsule   3. Thrombus  I82.90      Patient will take another week off. Recheck on Monday.    Patient Instructions   Call to radiology to schedule  [phone]  Recheck next week Monday with me  Remain off work  Continue the inhalers  Start tessalon pearls as needed for cough  Call Katherin to schedule your appointment  Return to clinic for any new or worsening symptoms or go to ER Urgent care in off hours           Return in about 1 week (around 6/23/2020) for Video Visit.      Phone call duration:  12 minutes    María Carl PA-C

## 2020-06-16 ENCOUNTER — VIRTUAL VISIT (OUTPATIENT)
Dept: FAMILY MEDICINE | Facility: CLINIC | Age: 50
End: 2020-06-16
Payer: COMMERCIAL

## 2020-06-16 DIAGNOSIS — U07.1 2019 NOVEL CORONAVIRUS DISEASE (COVID-19): Primary | ICD-10-CM

## 2020-06-16 DIAGNOSIS — R05.9 COUGH: ICD-10-CM

## 2020-06-16 DIAGNOSIS — I82.90 THROMBUS: ICD-10-CM

## 2020-06-16 PROBLEM — J12.82 PNEUMONIA DUE TO COVID-19 VIRUS: Status: ACTIVE | Noted: 2020-06-16

## 2020-06-16 PROCEDURE — 99213 OFFICE O/P EST LOW 20 MIN: CPT | Mod: 95 | Performed by: PHYSICIAN ASSISTANT

## 2020-06-16 RX ORDER — BENZONATATE 200 MG/1
200 CAPSULE ORAL 3 TIMES DAILY PRN
Qty: 60 CAPSULE | Refills: 2 | Status: SHIPPED | OUTPATIENT
Start: 2020-06-16 | End: 2021-04-20

## 2020-06-16 NOTE — LETTER
[address] [address]  Phone: [phone]  Fax: [phone]    June 16, 2020        Aaorn Mercado  [address] [address]          To whom it may concern:    RE: Aaron Mercado    Patient will need to remain out of work until 6/21/2020. The plan is to return to work without restrictions 6/22/2020.    Please contact me for questions or concerns.      Sincerely,        María Carl PA-C

## 2020-06-16 NOTE — PATIENT INSTRUCTIONS
Call to radiology to schedule  [phone]  Recheck next week Monday with me  Remain off work  Continue the inhalers  Start tessalon pearls as needed for cough  Call Katherin to schedule your appointment  Return to clinic for any new or worsening symptoms or go to ER Urgent care in off hours      normal...

## 2020-06-22 ENCOUNTER — VIRTUAL VISIT (OUTPATIENT)
Dept: FAMILY MEDICINE | Facility: CLINIC | Age: 50
End: 2020-06-22
Payer: COMMERCIAL

## 2020-06-22 ENCOUNTER — TELEPHONE (OUTPATIENT)
Dept: FAMILY MEDICINE | Facility: CLINIC | Age: 50
End: 2020-06-22

## 2020-06-22 DIAGNOSIS — I74.3 FEMORAL POPLITEAL ARTERY THROMBUS (H): ICD-10-CM

## 2020-06-22 DIAGNOSIS — U07.1 PNEUMONIA DUE TO COVID-19 VIRUS: Primary | ICD-10-CM

## 2020-06-22 DIAGNOSIS — E11.9 TYPE 2 DIABETES MELLITUS WITHOUT COMPLICATION, WITHOUT LONG-TERM CURRENT USE OF INSULIN (H): ICD-10-CM

## 2020-06-22 DIAGNOSIS — E78.5 HYPERLIPIDEMIA WITH TARGET LDL LESS THAN 100: ICD-10-CM

## 2020-06-22 DIAGNOSIS — J12.82 PNEUMONIA DUE TO COVID-19 VIRUS: Primary | ICD-10-CM

## 2020-06-22 PROCEDURE — 99213 OFFICE O/P EST LOW 20 MIN: CPT | Mod: 95 | Performed by: NURSE PRACTITIONER

## 2020-06-22 NOTE — TELEPHONE ENCOUNTER
Reason for call:  Form   Our goal is to have forms completed within 72 hours, however some forms may require a visit or additional information.     Who is the form from? Patient  Where did the form come from? form was faxed in  What clinic location was the form placed at?   Where was the form placed? Given to physician  What number is listed as a contact on the form?     Phone call message - patient request for a letter, form or note:     Date needed: as soon as possible  Please fax to [phone]  Has the patient signed a consent form for release of information? NO    Additional comments:     Type of letter, form or note: disability    Phone number to reach patient:  Home number on file [phone] (home)    Best Time:      Can we leave a detailed message on this number?      Travel screening:

## 2020-06-22 NOTE — PROGRESS NOTES
"Aaron Mercado is a 49 year old male who is being evaluated via a billable telephone visit.      The patient has been notified of following:     \"This telephone visit will be conducted via a call between you and your physician/provider. We have found that certain health care needs can be provided without the need for a physical exam.  This service lets us provide the care you need with a short phone conversation.  If a prescription is necessary we can send it directly to your pharmacy.  If lab work is needed we can place an order for that and you can then stop by our lab to have the test done at a later time.    Telephone visits are billed at different rates depending on your insurance coverage. During this emergency period, for some insurers they may be billed the same as an in-person visit.  Please reach out to your insurance provider with any questions.    If during the course of the call the physician/provider feels a telephone visit is not appropriate, you will not be charged for this service.\"    Patient has given verbal consent for Telephone visit?  Yes    What phone number would you like to be contacted at? [phone]    How would you like to obtain your AVS? Levi Morejon     Aaron Mercado is a 49 year old male who presents via phone visit today for the following health issues:    HPI    Patient just stated that he has been out of work for about a month and he is needing a letter for clearance to be able to go back to work. Wants to make sure that provider received and filled out disability paper work that was sent in. He can  as soon as it is completed. He is feeling much recovered at this point; he can walk longer distances without feeling short of breath, and he has received a negative Covid test.        Sahra Lyman MA            -------------------------------------    Patient Active Problem List   Diagnosis     obese bmi over 35     Genital warts     Eczema     Type 2 diabetes " mellitus without complication (H)     Hypertension goal BP (blood pressure) < 140/90     Hyperlipidemia with target LDL less than 100     Major depressive disorder, recurrent episode, moderate (H)     Cellulitis     Acute bilateral low back pain without sciatica     Thrombus     Femoral popliteal artery thrombus (H)     Pneumonia due to COVID-19 virus     Past Surgical History:   Procedure Laterality Date     C ORAL SURGERY PROCEDURE  2000    [place] teeth extraction     IR LOWER EXTREMITY ANGIOGRAM LEFT  6/5/2020     IR MECH THROM PRIM ART, INIL VESSEL (PERIPHERAL)  6/5/2020     LASIK  2003       Social History     Tobacco Use     Smoking status: Never Smoker     Smokeless tobacco: Never Used     Tobacco comment: Non smoke home   Substance Use Topics     Alcohol use: Yes     Comment: 1-2 nights out during the week     Family History   Problem Relation Age of Onset     Diabetes Mother      Family History Negative Father      Family History Negative Sister      Family History Negative Sister      Family History Negative Brother      Family History Negative Brother      Family History Negative Brother      Family History Negative Sister      Family History Negative Son            Reviewed and updated as needed this visit by Provider         Review of Systems   Constitutional, HEENT, cardiovascular, pulmonary, gi and gu systems are negative, except as otherwise noted.       Objective   Reported vitals:  There were no vitals taken for this visit.   healthy, alert and no distress  PSYCH: Alert and oriented times 3; coherent speech, normal   rate and volume, able to articulate logical thoughts, able   to abstract reason, no tangential thoughts, no hallucinations   or delusions  His affect is normal  RESP: No cough, no audible wheezing, able to talk in full sentences  Remainder of exam unable to be completed due to telephone visits    Diagnostic Test Results:  Labs reviewed in Epic        Assessment/Plan:      ICD-10-CM     1. Pneumonia due to COVID-19 virus  U07.1     J12.89    2. Hyperlipidemia with target LDL less than 100  E78.5    3. Type 2 diabetes mellitus without complication, without long-term current use of insulin (H)  E11.9    4. Femoral popliteal artery thrombus (H)  I74.3      -return to work paperwork completed; Follow up with PCP regarding chronic conditions within the next month  No follow-ups on file.      Phone call duration:  15 minutes    GILLIAN Cates CNP

## 2020-06-22 NOTE — LETTER
June 22, 2020      Aaron Mercado  [address] [place] [id number]        To Whom It May Concern:    Aaron Mercado  was seen on 6/22/2020.  He has been absent from work from 5/26/20 to 6/22/2020 and is now fit to return to work with no restrictions.       Sincerely,        GILLIAN Cates CNP

## 2020-06-30 ENCOUNTER — MYC MEDICAL ADVICE (OUTPATIENT)
Dept: FAMILY MEDICINE | Facility: CLINIC | Age: 50
End: 2020-06-30

## 2020-06-30 NOTE — LETTER
[facility]  [address]  [address]  [facility] [address]  [phone]          July 1, 2020    Aaron Mercado                                                                                                                     [address] [address]            Re: Aaron Rogelio    To whom it may concern:    Aaron has been under my medical care. It is my recommendation that he be able to use a face shield instead of a facemask at work. Please let my office now if you have any further questions.        Sincerely,         Allison Jacobs CNP

## 2020-07-01 NOTE — TELEPHONE ENCOUNTER
Letter created and signed. My chart message sent to patient.    Lauren Bedoya RN   [facility]

## 2020-07-03 ENCOUNTER — MYC MEDICAL ADVICE (OUTPATIENT)
Dept: FAMILY MEDICINE | Facility: CLINIC | Age: 50
End: 2020-07-03

## 2020-07-03 ENCOUNTER — ANCILLARY PROCEDURE (OUTPATIENT)
Dept: ULTRASOUND IMAGING | Facility: CLINIC | Age: 50
End: 2020-07-03
Payer: COMMERCIAL

## 2020-07-03 DIAGNOSIS — I82.90 THROMBUS: ICD-10-CM

## 2020-07-06 ENCOUNTER — MYC MEDICAL ADVICE (OUTPATIENT)
Dept: FAMILY MEDICINE | Facility: CLINIC | Age: 50
End: 2020-07-06

## 2020-07-06 NOTE — TELEPHONE ENCOUNTER
Forms were printed and will be resent. See 7/3 enc.     Sakshi Watkins RN   [facility]

## 2020-07-06 NOTE — TELEPHONE ENCOUNTER
Response sent to patient via [employer].     Sakshi Watkins RN   [facility]

## 2020-07-07 ENCOUNTER — TELEPHONE (OUTPATIENT)
Dept: VASCULAR SURGERY | Facility: CLINIC | Age: 50
End: 2020-07-07

## 2020-07-07 ENCOUNTER — MYC MEDICAL ADVICE (OUTPATIENT)
Dept: VASCULAR SURGERY | Facility: CLINIC | Age: 50
End: 2020-07-07

## 2020-07-07 ENCOUNTER — VIRTUAL VISIT (OUTPATIENT)
Dept: VASCULAR SURGERY | Facility: CLINIC | Age: 50
End: 2020-07-07
Payer: COMMERCIAL

## 2020-07-07 DIAGNOSIS — I74.3 FEMORAL POPLITEAL ARTERY THROMBUS (H): ICD-10-CM

## 2020-07-07 DIAGNOSIS — I82.90 THROMBUS: Primary | ICD-10-CM

## 2020-07-07 ASSESSMENT — PAIN SCALES - GENERAL: PAINLEVEL: NO PAIN (0)

## 2020-07-07 NOTE — TELEPHONE ENCOUNTER
Called  regarding appointment today at 1:00 with Dee Wall.    Called to start the rooming process.,Will call back in 5-7 mins.    No voicemail.

## 2020-07-07 NOTE — NURSING NOTE
Vascular Rooming Note     Aaron Mercado's goals for this visit include:   Chief Complaint   Patient presents with     RECHABELINO Chu, is participating in a video visit today for a follow up left leg angio, feeling good, no concerns at this time, sa reported by patient.     Karen Rashid LPN

## 2020-07-07 NOTE — PROGRESS NOTES
"VASCULAR SURGERY VIDEO VISIT NOTE:  Reason for Visit:  Hospital follow up    Aaron Mercado is a 49 year old male who is being evaluated via a billable video visit.      The patient has been notified of following:     \"This video visit will be conducted via a call between you and your physician/provider. We have found that certain health care needs can be provided without the need for an in-person physical exam.  This service lets us provide the care you need with a video conversation.  If a prescription is necessary we can send it directly to your pharmacy.  If lab work is needed we can place an order for that and you can then stop by our lab to have the test done at a later time.    Video visits are billed at different rates depending on your insurance coverage.  Please reach out to your insurance provider with any questions.    If during the course of the call the physician/provider feels a video visit is not appropriate, you will not be charged for this service.\"    Patient has given verbal consent for Video visit? Yes  How would you like to obtain your AVS? [facility]  Patient would like the video invitation sent by: Other e-mail: my chart connect  Will anyone else be joining your video visit? No        Video-Visit Details    Type of service:  Video Visit    Video Start Time: 13:19  Video End Time: 13:33    Originating Location (pt. Location): Home    Distant Location (provider location):  [facility] VASCULAR [facility]INIC     Platform used for Video Visit: [employer]      HPI:  Mr. Mercado is a 49 year old with PMH significant for HTN, DMII, and HLD who presented to the hospital in late may after acute onset of left leg pain and short distance claudication.  He was found to be COVID positive and CTA showed thrombus in the left profunda and popliteal arteries with extension into the tibial circulation.  He underwent suction thrombectomy and lytic therapy with IR.  He is participating in this visit as part of his follow-up from " the hospital.     Subjective:  Mr. Mercado reports overall he is doing very well since he was discharged from the hospital.  He does continue to experience some tingling in his left calf, generally towards the end of the day after being on his feet.  He denies any symptoms consistent with claudication or rest pain.  He has returned to work without issues.  He feels mostly recovered from his COVID pneumonia and denies any subsequent illnesses.    Results:  Examinations 7/3/2020:  1. Resting GWEN and VPR  2. Bilateral lower extremity toe PPG's.    IMPRESSION:   1. RIGHT:       A. Resting GWEN is normal, 1.07.       B. Resting TBI is ABNORMAL, 0.66.       C. Mildly abnormal 3rd digit PPG.     2. LEFT:       A. Resting GWEN is normal, 1.06.       B. Resting TBI is ABNORMAL, 0.63.       C. Mildly abnormal 4th digit PPG.    Exam: Duplex ultrasound of left lower extremity arteries dated  7/3/2020 10:14 AM    IMPRESSION: Left anterior tibial artery disease.     Diminished flow on the left anterior tibial artery with monophasic  wave pattern concerning for stenosis/occlusion. Evaluation of the  remaining arterial vessels in the left lower extremity are within  normal limits.      Assessment:  49 year old male s/p LLE thrombectomy and lysis of thrombosis secondary to COVID 19.  LLE duplex demonstrates left anterior tibial artery disease that is asymptomatic.    Plan:  -Continue Eliquis for 6 months.  Would consider initiating aspirin after course of anticoagulation given other risk factors.  -Will repeat GWEN and visit with MART in 1 year or sooner if patient develops symptoms consistent with claudication.  -If experiences pain similar to previous clot, report to the emergency department immediately.          Dee Wall DNP, APRN, AGNP-C  Division of Vascular Surgery   [facility]  Pager: [phone]

## 2020-07-07 NOTE — PATIENT INSTRUCTIONS
-Continue your Eliquis for 6 months.  After that time, begin a daily baby aspirin.  -Repeat GWEN and visit with vascular surgery MART in 1 year or sooner if you being to have symptoms.  -If you experience symptoms similar to your previous clot, report to the emergency department immediately.    With questions, concerns, or to request an appointment, please call either:    Jaimie Lew, Care Coordinator RN, Vascular Surgery  [phone]    Vascular Call Center  [phone]    To contact someone after 5 pm, on a weekend, or on a Holiday, please call:  [facility]  [phone], option 4 to have the attending physician for Vascular Surgery Service paged.

## 2020-07-07 NOTE — LETTER
7/7/2020       RE: Aaron Mercado  [address] [address]     Dear Colleague,    Thank you for referring your patient, Aaron Mercado, to the [facility]. Please see a copy of my visit note below.    VASCULAR SURGERY VIDEO VISIT NOTE:  Reason for Visit:  Hospital follow up    Aaron Mercado is a 49 year old male who is being evaluated via a billable video visit.          Video-Visit Details    Type of service:  Video Visit    Video Start Time: 13:19  Video End Time: 13:33    Originating Location (pt. Location): Home    Distant Location (provider location):  [facility] VASCULAR [facility]INIC     Platform used for Video Visit: [employer]      HPI:  Mr. Mercado is a 49 year old with PMH significant for HTN, DMII, and HLD who presented to the hospital in late may after acute onset of left leg pain and short distance claudication.  He was found to be COVID positive and CTA showed thrombus in the left profunda and popliteal arteries with extension into the tibial circulation.  He underwent suction thrombectomy and lytic therapy with IR.  He is participating in this visit as part of his follow-up from the hospital.     Subjective:  Mr. Mercado reports overall he is doing very well since he was discharged from the hospital.  He does continue to experience some tingling in his left calf, generally towards the end of the day after being on his feet.  He denies any symptoms consistent with claudication or rest pain.  He has returned to work without issues.  He feels mostly recovered from his COVID pneumonia and denies any subsequent illnesses.    Results:  Examinations 7/3/2020:  1. Resting GWEN and VPR  2. Bilateral lower extremity toe PPG's.    IMPRESSION:   1. RIGHT:       A. Resting GWEN is normal, 1.07.       B. Resting TBI is ABNORMAL, 0.66.       C. Mildly abnormal 3rd digit PPG.     2. LEFT:       A. Resting GWEN is normal, 1.06.       B. Resting TBI is  ABNORMAL, 0.63.       C. Mildly abnormal 4th digit PPG.    Exam: Duplex ultrasound of left lower extremity arteries dated  7/3/2020 10:14 AM    IMPRESSION: Left anterior tibial artery disease.     Diminished flow on the left anterior tibial artery with monophasic  wave pattern concerning for stenosis/occlusion. Evaluation of the  remaining arterial vessels in the left lower extremity are within  normal limits.      Assessment:  49 year old male s/p LLE thrombectomy and lysis of thrombosis secondary to COVID 19.  LLE duplex demonstrates left anterior tibial artery disease that is asymptomatic.    Plan:  -Continue Eliquis for 6 months.  Would consider initiating aspirin after course of anticoagulation given other risk factors.  -Will repeat GWEN and visit with MART in 1 year or sooner if patient develops symptoms consistent with claudication.  -If experiences pain similar to previous clot, report to the emergency department immediately.          Dee Wall DNP, APRN, AGNP-C  Division of Vascular Surgery   [facility]  Pager: [phone]

## 2020-07-28 ENCOUNTER — VIRTUAL VISIT (OUTPATIENT)
Dept: HEMATOLOGY | Facility: CLINIC | Age: 50
End: 2020-07-28
Attending: INTERNAL MEDICINE
Payer: COMMERCIAL

## 2020-07-28 DIAGNOSIS — I82.90 THROMBUS: Primary | ICD-10-CM

## 2020-07-28 NOTE — PROGRESS NOTES
Tried calling the patient multiple times. No response. VM box does not accept voicemail.   No show.   Myrtle Rodriguez   of Medicine   Hematology and medical Oncology   [facility]

## 2020-09-21 ENCOUNTER — MYC MEDICAL ADVICE (OUTPATIENT)
Dept: FAMILY MEDICINE | Facility: CLINIC | Age: 50
End: 2020-09-21

## 2020-09-21 ENCOUNTER — MYC REFILL (OUTPATIENT)
Dept: FAMILY MEDICINE | Facility: CLINIC | Age: 50
End: 2020-09-21

## 2020-09-21 DIAGNOSIS — E11.9 TYPE 2 DIABETES MELLITUS WITHOUT COMPLICATION, WITHOUT LONG-TERM CURRENT USE OF INSULIN (H): ICD-10-CM

## 2020-09-21 NOTE — TELEPHONE ENCOUNTER
Routing refill request to provider for review/approval because:  Vicky given x1 and patient did not follow up, please advise    He is over due for labs and diabetic check.    Reception: please call and schedule a diabetic follow up with fasting labs.  Thank you

## 2020-10-03 ENCOUNTER — VIRTUAL VISIT (OUTPATIENT)
Dept: FAMILY MEDICINE | Facility: OTHER | Age: 50
End: 2020-10-03
Payer: COMMERCIAL

## 2020-10-03 PROCEDURE — 99421 OL DIG E/M SVC 5-10 MIN: CPT | Performed by: PHYSICIAN ASSISTANT

## 2020-10-03 NOTE — PROGRESS NOTES
"Date: 10/03/2020 14:07:52  Clinician: Beverly Azul  Clinician NPI: [id number]  Patient: Aaron Mercado  Patient : 1970  Patient Address: [address] [place] [address]  Patient Phone: [phone]  Visit Protocol: URI  Patient Summary:  Aaron is a 50 year old ( : 1970 ) male who initiated a [facility] Visit for cold, sinus infection, or influenza. When asked the question \"Please sign me up to receive news, health information and promotions from [facility].\", Aaron responded \"No\".    Aaron states his symptoms started today.   His symptoms consist of a headache, vomiting, rhinitis, nausea, facial pain or pressure, myalgia, and malaise. He is experiencing mild difficulty breathing with activities but can speak normally in full sentences.   Symptom details     Nasal secretions: The color of his mucus is blood-tinged and clear.    Facial pain or pressure: The facial pain or pressure does not feel worse when bending or leaning forward.     Headache: He states the headache is mild (1-3 on a 10 point pain scale).      Aaron denies having ear pain, wheezing, fever, enlarged lymph nodes, cough, nasal congestion, anosmia, chills, sore throat, teeth pain, ageusia, and diarrhea. He also denies taking antibiotic medication in the past month and having recent facial or sinus surgery in the past 60 days.   Precipitating events  He has not recently been exposed to someone with influenza. Aaron has been in close contact with the following high risk individuals: adults 65 or older and people with asthma, heart disease or diabetes.   Pertinent COVID-19 (Coronavirus) information  In the past 14 days, Aaron has not worked in a congregate living setting.   He does not work or volunteer as healthcare worker or a  and does not work or volunteer in a healthcare facility.   Aaron also has not lived in a congregate living setting in the past 14 days. He does not live with a healthcare worker.   " Aaron has not had a close contact with a laboratory-confirmed COVID-19 patient within 14 days of symptom onset.   Since December 2019, Aaron and has had upper respiratory infection (URI) or influenza-like illness. has been diagnosed with lab-confirmed COVID-19 test      Date of his positive COVID-19 test: 05/31/2020     Date(s) of previous URI or influenza-like illness (free-text): Since 5/31/2020     Symptoms Aaron experienced during previous URI or influenza-like illness as reported by the patient (free-text): Runny nose headaches        Pertinent medical history  Aaron does not need a return to work/school note.   Weight: 260 lbs   Aaron does not smoke or use smokeless tobacco.   Additional information as reported by the patient (free text): I blew a blood clot out of my nose   Weight: 260 lbs    MEDICATIONS: Eliquis oral, lisinopril-hydrochlorothiazide oral, metformin oral, ALLERGIES: Sudafed PE  Clinician Response:  Dear Aaron,  Based on the information provided, you have a viral upper respiratory infection, otherwise known as a cold. Symptoms vary from person to person, but can include sneezing, coughing, a runny nose, sore throat, and headache and range from mild to severe.  Unfortunately, there are no medications that can cure a cold, so treatment is focused on controlling symptoms as much as possible. Most people gradually feel better until symptoms are gone in 1-2 weeks.  Medication information  Because you have a viral infection, antibiotics will not help you get better. Treating a viral infection with antibiotics could actually make you feel worse.  Self care  Steps you can take to be as comfortable as possible:     Rest.    Drink plenty of fluids.    Take a warm shower to loosen congestion    Use a cool-mist humidifier.     When to seek care  Please be seen in a clinic or urgent care if any of the following occur:   New symptoms develop, or symptoms become worse   Call ahead before going to the  clinic or urgent care.  Additional treatment plan   Your symptoms show that you may have coronavirus (COVID-19). This illness can cause fever, cough and trouble breathing. Many people get a mild case and get better on their own. Some people can get very sick.  Based on the symptoms you have shared, I would like you to be re-checked in 2 to 3 days. Please call your family clinic to set up a video or phone visit.  Will I be tested for COVID-19?  We would like to test you for this virus.   Please call [phone] to schedule your visit. Explain that you were referred by [facility] to have a COVID-19 test. Be ready to share [facility] visit ID number.   The following will serve as your written order for this COVID Test, ordered by me, for the indication of suspected COVID [Z20.828]: The test will be ordered in [employer], our electronic health record, after you are scheduled. It will show as ordered and authorized by Sterling Lopes MD.  Order: COVID-19 (Coronavirus) PCR for SYMPTOMATIC testing from [facility].  1.When it's time for your COVID test:   Stay at least 6 feet away from others. (If someone will drive you to your test, stay in the backseat, as far away from the  as you can.)   Cover your mouth and nose with a mask, tissue or washcloth.  Go straight to the testing site. Don't make any stops on the way there or back.      2.Starting now: Stay home and away from others (self-isolate) until:   You've had no fever---and no medicine that reduces fever---for one full day (24 hours). And...   Your other symptoms have gotten better. For example, your cough or breathing has improved. And...   At least 10 days have passed since your symptoms started.       During this time, don't leave the house except for testing or medical care.   Stay in your own room, even for meals. Use your own bathroom if you can.   Stay away from others in your home. No hugging, kissing or shaking hands. No visitors.  Don't go to work, school or anywhere  "else.    Clean \"high touch\" surfaces often (doorknobs, counters, handles, etc.). Use a household cleaning spray or wipes. You'll find a full list of  on the EPA website: www.epa.gov/pesticide-registration/list-n-disinfectants-use-against-sars-cov-2.   Cover your mouth and nose with a mask, tissue or washcloth to avoid spreading germs.  Wash your hands and face often. Use soap and water.  Caregivers in these groups are at risk for severe illness due to COVID-19:  o People 65 years and older  o People who live in a nursing home or long-term care facility  o People with chronic disease (lung, heart, cancer, diabetes, kidney, liver, immunologic)   o People who have a weakened immune system, including those who:   Are in cancer treatment  Take medicine that weakens the immune system, such as corticosteroids  Had a bone marrow or organ transplant  Have an immune deficiency  Have poorly controlled HIV or AIDS  Are obese (body mass index of 40 or higher)  Smoke regularly   o Caregivers should wear gloves while washing dishes, handling laundry and cleaning bedrooms and bathrooms.  o Use caution when washing and drying laundry: Don't shake dirty laundry, and use the warmest water setting that you can.  o For more tips, go to www.cdc.gov/coronavirus/2019-ncov/downloads/10Things.pdf.      How can I take care of myself?   Get lots of rest. Drink extra fluids (unless a doctor has told you not to)   Take Tylenol (acetaminophen) for fever or pain. If you have liver or kidney problems, ask your family doctor if it's okay to take Tylenol.   Adults can take either:    650 mg (two 325 mg pills) every 4 to 6 hours, or...   1,000 mg (two 500 mg pills) every 8 hours as needed.    Note: Don't take more than 3,000 mg in one day. Acetaminophen is found in many medicines (both prescribed and over-the-counter medicines). Read all labels to be sure you don't take too much.   For children, check the Tylenol bottle for the right dose. The " dose is based on the child's age or weight.    If you have other health problems (like cancer, heart failure, an organ transplant or severe kidney disease): Call your specialty clinic if you don't feel better in the next 2 days.       Know when to call 911. Emergency warning signs include:    Trouble breathing or shortness of breath Pain or pressure in the chest that doesn't go away Feeling confused like you haven't felt before, or not being able to wake up Bluish-colored lips or face  Where can I get more information?   [facility] -- About COVID-19: [email or web address]   CDC -- What to Do If You're Sick: www.cdc.gov/coronavirus/2019-ncov/about/steps-when-sick.html   CDC -- Ending Home Isolation: www.cdc.gov/coronavirus/2019-ncov/hcp/disposition-in-home-patients.html   [facility] -- Caring for Someone: www.cdc.gov/coronavirus/2019-ncov/if-you-are-sick/care-for-someone.html   [facility] -- Interim Guidance for Hospital Discharge to Home: www.[facility].[place]/diseases/coronavirus/hcp/hospdischarge.pdf   [facility] clinical trials (COVID-19 research studies): clinicalaffairs.[facility]-clinical-trials    Below are the COVID-19 hotlines at the [place] Department of Health ([facility]). Interpreters are available.    For health questions: Call 532-932-6583 or 1-230.361.5063 (7 a.m. to 7 p.m.) For questions about schools and childcare: Call 493-782-2729 or 1-482.190.8841 (7 a.m. to 7 p.m.)       Diagnosis: Nasal congestion  Diagnosis ICD: R09.81

## 2020-11-04 ENCOUNTER — OFFICE VISIT (OUTPATIENT)
Dept: FAMILY MEDICINE | Facility: CLINIC | Age: 50
End: 2020-11-04
Payer: COMMERCIAL

## 2020-11-04 VITALS
SYSTOLIC BLOOD PRESSURE: 126 MMHG | DIASTOLIC BLOOD PRESSURE: 88 MMHG | OXYGEN SATURATION: 97 % | TEMPERATURE: 98.6 F | RESPIRATION RATE: 16 BRPM | HEIGHT: 68 IN | HEART RATE: 82 BPM | BODY MASS INDEX: 42.07 KG/M2

## 2020-11-04 DIAGNOSIS — K76.9 LIVER LESION: ICD-10-CM

## 2020-11-04 DIAGNOSIS — H93.90 EAR LESION: ICD-10-CM

## 2020-11-04 DIAGNOSIS — E11.9 TYPE 2 DIABETES MELLITUS WITHOUT COMPLICATION, WITHOUT LONG-TERM CURRENT USE OF INSULIN (H): Primary | ICD-10-CM

## 2020-11-04 DIAGNOSIS — E66.01 MORBID OBESITY (H): ICD-10-CM

## 2020-11-04 DIAGNOSIS — I10 HYPERTENSION GOAL BP (BLOOD PRESSURE) < 140/90: ICD-10-CM

## 2020-11-04 DIAGNOSIS — Z23 NEED FOR PROPHYLACTIC VACCINATION AND INOCULATION AGAINST INFLUENZA: ICD-10-CM

## 2020-11-04 DIAGNOSIS — I74.3 FEMORAL POPLITEAL ARTERY THROMBUS (H): ICD-10-CM

## 2020-11-04 LAB — HBA1C MFR BLD: 5.9 % (ref 0–5.6)

## 2020-11-04 PROCEDURE — 83036 HEMOGLOBIN GLYCOSYLATED A1C: CPT | Performed by: FAMILY MEDICINE

## 2020-11-04 PROCEDURE — 90686 IIV4 VACC NO PRSV 0.5 ML IM: CPT | Performed by: FAMILY MEDICINE

## 2020-11-04 PROCEDURE — 90471 IMMUNIZATION ADMIN: CPT | Performed by: FAMILY MEDICINE

## 2020-11-04 PROCEDURE — 80061 LIPID PANEL: CPT | Performed by: FAMILY MEDICINE

## 2020-11-04 PROCEDURE — 99214 OFFICE O/P EST MOD 30 MIN: CPT | Mod: 25 | Performed by: FAMILY MEDICINE

## 2020-11-04 PROCEDURE — 36415 COLL VENOUS BLD VENIPUNCTURE: CPT | Performed by: FAMILY MEDICINE

## 2020-11-04 PROCEDURE — 80053 COMPREHEN METABOLIC PANEL: CPT | Performed by: FAMILY MEDICINE

## 2020-11-04 PROCEDURE — 82043 UR ALBUMIN QUANTITATIVE: CPT | Performed by: FAMILY MEDICINE

## 2020-11-04 RX ORDER — LORAZEPAM 1 MG/1
TABLET ORAL
Qty: 1 TABLET | Refills: 0 | Status: SHIPPED | OUTPATIENT
Start: 2020-11-04 | End: 2021-04-20

## 2020-11-04 RX ORDER — LISINOPRIL 5 MG/1
5 TABLET ORAL DAILY
Qty: 90 TABLET | Refills: 1 | Status: SHIPPED | OUTPATIENT
Start: 2020-11-04 | End: 2021-11-10

## 2020-11-04 ASSESSMENT — ANXIETY QUESTIONNAIRES
2. NOT BEING ABLE TO STOP OR CONTROL WORRYING: NOT AT ALL
3. WORRYING TOO MUCH ABOUT DIFFERENT THINGS: NOT AT ALL
5. BEING SO RESTLESS THAT IT IS HARD TO SIT STILL: NOT AT ALL
7. FEELING AFRAID AS IF SOMETHING AWFUL MIGHT HAPPEN: NOT AT ALL
GAD7 TOTAL SCORE: 0
6. BECOMING EASILY ANNOYED OR IRRITABLE: NOT AT ALL
IF YOU CHECKED OFF ANY PROBLEMS ON THIS QUESTIONNAIRE, HOW DIFFICULT HAVE THESE PROBLEMS MADE IT FOR YOU TO DO YOUR WORK, TAKE CARE OF THINGS AT HOME, OR GET ALONG WITH OTHER PEOPLE: NOT DIFFICULT AT ALL
1. FEELING NERVOUS, ANXIOUS, OR ON EDGE: NOT AT ALL

## 2020-11-04 ASSESSMENT — PATIENT HEALTH QUESTIONNAIRE - PHQ9
SUM OF ALL RESPONSES TO PHQ QUESTIONS 1-9: 2
5. POOR APPETITE OR OVEREATING: NOT AT ALL

## 2020-11-04 NOTE — PROGRESS NOTES
Subjective     Aaron Mercado is a 50 year old male who presents to clinic today for the following health issues:    HPI      Diabetes Follow-up      How often are you checking your blood sugar? Not at all    What concerns do you have today about your diabetes? Tingling in legs and cold feet      Do you have any of these symptoms? (Select all that apply)  No numbness or tingling in feet.  No redness, sores or blisters on feet.  No complaints of excessive thirst.  No reports of blurry vision.  No significant changes to weight.    Have you had a diabetic eye exam in the last 12 months? No    Hypertension Follow-up      Do you check your blood pressure regularly outside of the clinic? No     Are you following a low salt diet? Yes    Are your blood pressures ever more than 140 on the top number (systolic) OR more   than 90 on the bottom number (diastolic), for example 140/90? No    BP Readings from Last 2 Encounters:   11/04/20 126/88   06/07/20 (!) 133/90     Hemoglobin A1C (%)   Date Value   11/04/2020 5.9 (H)   10/29/2019 5.9 (H)     LDL Cholesterol Calculated (mg/dL)   Date Value   02/08/2018 132 (H)   12/14/2016 135 (H)       How many servings of fruits and vegetables do you eat daily?  0-1    On average, how many sweetened beverages do you drink each day (Examples: soda, juice, sweet tea, etc.  Do NOT count diet or artificially sweetened beverages)?   2-3 juice a week     How many days per week do you exercise enough to make your heart beat faster? 3 or less    How many minutes a day do you exercise enough to make your heart beat faster? 9 or less    How many days per week do you miss taking your medication? 0    Blood clot and had surgery. Was placed on blood thinner - going to stop in 6 months. Thought it was 6 months and stopped 3 days ago. Out for last 3 days.    Left knee - hard to walk sometime. Using ibuprofen. Since Sunday feeling fine.      Was working out but  cant go to gym due to covid.     Right ear -  "drainage. Sometime blood. Sometime qtips.      Social History     Occupational History     Occupation: Night [employer]     Employer: [employer]   Tobacco Use     Smoking status: Never Smoker     Smokeless tobacco: Never Used     Tobacco comment: Non smoke home   Substance and Sexual Activity     Alcohol use: Yes     Comment: 1-2 nights out during the week     Drug use: No     Sexual activity: Yes     Partners: Female     Birth control/protection: Condom     Allergies   Allergen Reactions     Atorvastatin      myalgia     Bactrim [Sulfamethoxazole W/Trimethoprim]      hives     Patient Active Problem List   Diagnosis     obese bmi over 35     Genital warts     Eczema     Type 2 diabetes mellitus without complication (H)     Hypertension goal BP (blood pressure) < 140/90     Hyperlipidemia with target LDL less than 100     Major depressive disorder, recurrent episode, moderate (H)     Cellulitis     Acute bilateral low back pain without sciatica     Thrombus     Femoral popliteal artery thrombus (H)     Pneumonia due to COVID-19 virus     Reviewed medications, social history and  past medical and surgical history.    Review of system: for general, respiratory, CVS, GI and psychiatry negative except for noted above.     EXAM:  /88 (BP Location: Left arm, Patient Position: Sitting, Cuff Size: Adult Large)   Pulse 82   Temp 98.6  F (37  C) (Oral)   Resp 16   Ht 1.727 m (5' 8\")   SpO2 97%   BMI 42.07 kg/m    Constitutional: healthy, alert and no distress   Psychiatric: mentation appears normal and affect normal/bright  Right and left ear examined - right ear canal and TM normal. Right outer ear area - small scab present.   Lungs - clear. Non whezing.  Heart - RRR, no murmur  Abdomen: Abdomen soft, obese    ASSESSMENT / PLAN:  (E11.9) Type 2 diabetes mellitus without complication, without long-term current use of insulin (H)  (primary encounter diagnosis)  Comment: A1c has been on lower side consistently. He " actually never had A1c of 6.5 or over but had glucose of over 300 twice. We can cut down metformin to every other day or 250mg every day and if a1c still remains low - we can stop it in future. He understood.   Plan: Lipid panel reflex to direct LDL Fasting,         Albumin Random Urine Quantitative with Creat         Ratio, Hemoglobin A1c, Comprehensive metabolic         panel, metFORMIN (GLUCOPHAGE) 500 MG tablet             (I10) Hypertension goal BP (blood pressure) < 140/90  Comment:  Low dose. Diastolic can be better but stick to same rx for now.   Plan: lisinopril (ZESTRIL) 5 MG tablet             (I74.3) Femoral popliteal artery thrombus (H)  Comment:  Suppose to be on blood thinner for 6 months. Dec 4 will be 6 months. He will get 1 more month of rx. Needs to follow up with vascular specialist next year.   Plan: apixaban ANTICOAGULANT (ELIQUIS) 5 MG tablet,         VASCULAR SURGERY REFERRAL             (E66.01) Morbid obesity (H)  Comment:  Body mass index is 42.07 kg/m .  Plan: offered weight loss clinic referral. He wishes to hold off on that.     (K76.9) Liver lesion  Comment:  Suppose to have MRI follow up. Will not tolerate closed space. OK to keep lorazepam handy. Needs . He understood.   Plan: MR Liver wo & w Contrast, LORazepam (ATIVAN) 1         MG tablet           Ear lesion  Comment  Appears to be irritated scab that must have bled. Keep area clean and dry. Avoid qtips.   If recurrence - follow up.     (Z23) Need for prophylactic vaccination and inoculation against influenza  Comment:    Plan: INFLUENZA VACCINE IM > 6 MONTHS VALENT IIV4         [24255], ADMIN 1st VACCINE           Return in about 6 months (around 5/4/2021).      The above note was dictated using voice recognition. Although reviewed after completion, some word and grammatical error may remain .

## 2020-11-05 LAB
ALBUMIN SERPL-MCNC: 3.8 G/DL (ref 3.4–5)
ALP SERPL-CCNC: 121 U/L (ref 40–150)
ALT SERPL W P-5'-P-CCNC: 67 U/L (ref 0–70)
ANION GAP SERPL CALCULATED.3IONS-SCNC: 2 MMOL/L (ref 3–14)
AST SERPL W P-5'-P-CCNC: 37 U/L (ref 0–45)
BILIRUB SERPL-MCNC: 0.7 MG/DL (ref 0.2–1.3)
BUN SERPL-MCNC: 20 MG/DL (ref 7–30)
CALCIUM SERPL-MCNC: 9.5 MG/DL (ref 8.5–10.1)
CHLORIDE SERPL-SCNC: 109 MMOL/L (ref 94–109)
CHOLEST SERPL-MCNC: 187 MG/DL
CO2 SERPL-SCNC: 28 MMOL/L (ref 20–32)
CREAT SERPL-MCNC: 0.87 MG/DL (ref 0.66–1.25)
CREAT UR-MCNC: 120 MG/DL
GFR SERPL CREATININE-BSD FRML MDRD: >90 ML/MIN/{1.73_M2}
GLUCOSE SERPL-MCNC: 118 MG/DL (ref 70–99)
HDLC SERPL-MCNC: 33 MG/DL
LDLC SERPL CALC-MCNC: 135 MG/DL
MICROALBUMIN UR-MCNC: 6 MG/L
MICROALBUMIN/CREAT UR: 4.86 MG/G CR (ref 0–17)
NONHDLC SERPL-MCNC: 154 MG/DL
POTASSIUM SERPL-SCNC: 4.1 MMOL/L (ref 3.4–5.3)
PROT SERPL-MCNC: 7.5 G/DL (ref 6.8–8.8)
SODIUM SERPL-SCNC: 139 MMOL/L (ref 133–144)
TRIGL SERPL-MCNC: 95 MG/DL

## 2020-11-05 RX ORDER — LOVASTATIN 10 MG
10 TABLET ORAL AT BEDTIME
Qty: 90 TABLET | Refills: 1 | Status: SHIPPED | OUTPATIENT
Start: 2020-11-05 | End: 2021-11-10

## 2020-11-05 ASSESSMENT — ANXIETY QUESTIONNAIRES: GAD7 TOTAL SCORE: 0

## 2020-11-05 NOTE — RESULT ENCOUNTER NOTE
As we dicussed your A1c is on lower side which is reassuring.   Your kidney and liver functions are fine. Urine test for leaking protein is fine.   Cholesterol is getting worse and risk of heart attack and stroke is high (around 9%). I recommend starting low dose of cholesterol medication to avoid worsening of cholesterol and to cut down risk of heart attack and stroke. It is once per day pill. I sent it to your pharmacy I remember you have had some muscle soreness with statins and we will try different statin which should hopefully will not cause those symptoms. .   Recheck labs and cholesterol in 6 months.     Quoc Flynn MD  [facility]

## 2020-12-17 ENCOUNTER — OFFICE VISIT (OUTPATIENT)
Dept: FAMILY MEDICINE | Facility: CLINIC | Age: 50
End: 2020-12-17
Payer: COMMERCIAL

## 2020-12-17 VITALS
OXYGEN SATURATION: 97 % | DIASTOLIC BLOOD PRESSURE: 99 MMHG | SYSTOLIC BLOOD PRESSURE: 143 MMHG | HEIGHT: 68 IN | BODY MASS INDEX: 42.69 KG/M2 | HEART RATE: 87 BPM | TEMPERATURE: 98.5 F

## 2020-12-17 DIAGNOSIS — J32.9 CHRONIC SINUSITIS, UNSPECIFIED LOCATION: Primary | ICD-10-CM

## 2020-12-17 DIAGNOSIS — H92.12 EAR DRAINAGE, LEFT: ICD-10-CM

## 2020-12-17 PROCEDURE — 99213 OFFICE O/P EST LOW 20 MIN: CPT | Performed by: STUDENT IN AN ORGANIZED HEALTH CARE EDUCATION/TRAINING PROGRAM

## 2020-12-17 NOTE — PROGRESS NOTES
"Subjective     Aaron Mercado is a 50 year old male who presents to clinic today for the following health issues:    HPI         Concern - Drainage from the left ear, popping in both ears   Onset: Off and on for a month and now consistent for past week, tried decongestant which helped temporarily; has had this in previous years and was prescribed antibiotic ear drops and antibiotics, which resolved symptoms  Description: feeling drainage, feels full of liquid, takes a wash cloth to wipe it out, maybe a yellowish color, has seen a little blood  IAccompanying Signs & Symptoms: No ear pain, has a headache today described as ache in back of the head; also nasal congestion; no sore throat, cough, fever, facial pain; endorses facial pressure in forehead   Therapies tried and outcome: Flonase nasal spray and oral decongestant     Nasal drainage - Also having a lot of nasal congestion since he had COVID in June. He was using Flonase but stopped it after 3-4 weeks, this was helpful. Drainage has been going on for a few months; he can smell a \"bad odor\" and can taste foul taste; endorses copious nasal discharge of yellow snot and has seen blood clots. Endorses pressure in his frontal sinus.     He has been forgetting to take his antihypertensives on certain days. Denies chest pain/pressure with exercise, shortness of breath or exercise intolerance, leg swelling.      Review of Systems   Constitutional, HEENT, cardiovascular, pulmonary, gi and gu systems are negative, except as otherwise noted.      Objective    BP (!) 143/99   Pulse 87   Temp 98.5  F (36.9  C) (Oral)   Ht 1.715 m (5' 7.5\")   SpO2 97%   BMI 42.69 kg/m    Body mass index is 42.69 kg/m .  Physical Exam   GENERAL: healthy, alert and no distress  EYES: Eyes grossly normal to inspection, PERRL and conjunctivae and sclerae normal  HENT: ear canals and TM's normal--pearly, non erythematous, no bulging or purulence, no drainage; no ear fullness; no pain with " traction of the ear lobe, no pain anterior to ear nor mastoid tenderness bilaterally; nose and mouth without ulcers or lesions, no pain with palpation of frontal or maxillary sinuses  NECK: no significant adenopathy, no asymmetry, masses, or scars and thyroid normal to palpation  RESP: lungs clear to auscultation - no rales, rhonchi or wheezes  CV: regular rate and rhythm, normal S1 S2, no S3 or S4, no murmur, click or rub, no peripheral edema and peripheral pulses strong  MS: no gross musculoskeletal defects noted, no edema  SKIN: no suspicious lesions or rashes  NEURO: Normal strength and tone, mentation intact and speech normal  PSYCH: mentation appears normal, affect normal/bright    Assessment & Plan     Chronic sinusitis, unspecified location  Ear drainage, left  Ongoing nasal congestion and discharge for several months associated with pressure in the frontal sinus. Also endorses ear drainage for past month, however no significant ear findings on his exam. Symptoms resolved with antibiotics in the past. Due to his chronic sinus symptoms, I think it's reasonable to treat with an antibiotic and have him resume Flonase with goal of resolving nasal symptoms and ear drainage. Discussed reasons to follow up including ongoing drainage, pain, fever or other concerning symptoms.   - amoxicillin-clavulanate (AUGMENTIN) 875-125 MG tablet  Dispense: 14 tablet; Refill: 0        Fatemeh Pena MD  [facility]

## 2020-12-17 NOTE — PATIENT INSTRUCTIONS
Patient Education     Antibiotic to your pharmacy. Restart nasal spray. Follow up if ear symptoms and nasal symptoms fail  To improve. Your blood pressure is high please restart your medications.     Sinusitis (Antibiotic Treatment)    The sinuses are air-filled spaces within the bones of the face. They connect to the inside of the nose. Sinusitis is an inflammation of the tissue that lines the sinuses. Sinusitis can occur during a cold. It can also happen due to allergies to pollens and other particles in the air. Sinusitis can cause symptoms of sinus congestion and a feeling of fullness. A sinus infection causes fever, headache, and facial pain. There is often green or yellow fluid draining from the nose or into the back of the throat (post-nasal drip). You have been given antibiotics to treat this condition.   Home care    Take the full course of antibiotics as instructed. Don't stop taking them, even when you feel better.    Drink plenty of water, hot tea, and other liquids as directed by the healthcare provider. This may help thin nasal mucus. It also may help your sinuses drain fluids.    Heat may help soothe painful areas of your face. Use a towel soaked in hot water. Or,  the shower and direct the warm spray onto your face. Using a vaporizer along with a menthol rub at night may also help soothe symptoms.     An expectorant with guaifenesin may help thin nasal mucus and help your sinuses drain fluids. Talk with your provider or pharmacists before taking an over-the-counter (OTC) medicine if you have any questions about it or its side effects..    You can use an OTC decongestant, unless a similar medicine was prescribed to you. Nasal sprays work the fastest. Use one that contains phenylephrine or oxymetazoline. First blow your nose gently. Then use the spray. Don't use these medicines more often than directed on the label. If you do, your symptoms may get worse. You may also take pills that contain  pseudoephedrine. Don t use products that combine multiple medicines. This is because side effects may be increased. Read labels. You can also ask the pharmacist for help. (People with high blood pressure should not use decongestants. They can raise blood pressure.) Talk with your provider or pharmacist if you have any questions about the medicine..    OTC antihistamines may help if allergies contributed to your sinusitis. Talk with your provider or pharmacist if you have any questions about the medicine..    Don't use nasal rinses or irrigation during an acute sinus infection, unless your healthcare provider tells you to. Rinsing may spread the infection to other areas in your sinuses.    Use acetaminophen or ibuprofen to control pain, unless another pain medicine was prescribed to you. If you have chronic liver or kidney disease or ever had a stomach ulcer, talk with your healthcare provider before using these medicines. Never give aspirin to anyone under age 18 who is ill with a fever. It may cause severe liver damage.    Don't smoke. This can make symptoms worse.    Follow-up care  Follow up with your healthcare provider, or as advised.   When to seek medical advice  Call your healthcare provider if any of these occur:     Facial pain or headache that gets worse    Stiff neck    Unusual drowsiness or confusion    Swelling of your forehead or eyelids    Symptoms don't go away in 10 days    Vision problems, such as blurred or double vision    Fever of 100.4 F (38 C) or higher, or as directed by your healthcare provider  Call 911  Call 911 if any of these occur:     Seizure    Trouble breathing    Feeling dizzy or faint    Fingernails, skin or lips look blue, purple , or gray  Prevention  Here are steps you can take to help prevent an infection:     Keep good hand washing habits.    Don t have close contact with people who have sore throats, colds, or other upper respiratory infections.    Don t smoke, and stay away  from secondhand smoke.    Stay up to date with of your vaccines.  [employer] last reviewed this educational content on 12/1/2019 2000-2020 [employer] [address] [facility], [place] [address]. All rights reserved. This information is not intended as a substitute for professional medical care. Always follow your healthcare professional's instructions.

## 2021-01-14 NOTE — ANESTHESIA CARE TRANSFER NOTE
History  Chief Complaint   Patient presents with    Chest Pain     The patient reports chest pain on and off since Saturday, and today his throat is starting to hurt  The patient found out today he is COVID (+) from an exposure at work  Rosemarie Clifton is a 61-year-old male no known past medical history ambulatory to the emergency department for evaluation of sore throat and chest discomfort  The patient reports gradual onset of generalized chest wall discomfort occurring on Thursday which he describes as a tightness  He denies radiation of pain down toward the back of, neck, jaw, or arm  He relates that symptoms have otherwise been constant since onset  The patient works as a  stating that over the weekend he told his wife that he fell he had strained his chest wall though he denied any known injury  He finds his symptoms are exacerbated when he tries to take deep breaths  He has been taking Advil with transient improvement  Patient states that today he had also developed a sore throat and upper abdominal pain  He states that he had a known COVID exposure at work, initially testing negative last week, but states that he had a rapid test today for which he had tested positive  He reports a generalized sore throat, denying unilateral pain, neck swelling, pain or stiffness, trismus, trouble swallowing or tolerating oral secretions  He denies fever or chills  He admits to cough intermittently productive for clear yellow phlegm  Denies any significant personal or family cardiac history  He denies any shortness of breath or respiratory complaints, unilateral calf pain or swelling, dyspnea on exertion, orthopnea  He denies prior history of DVT/PE        History provided by:  Patient  Chest Pain  Pain quality: tightness    Onset quality:  Gradual  Timing:  Constant  Progression:  Waxing and waning  Worsened by:  Deep breathing  Associated symptoms: abdominal pain, cough and nausea Patient: Aaron Mercado    Procedure(s):  Left leg angiography  thrombembolectomy    Diagnosis: Ischemia of left lower extremity [I99.8]  Diagnosis Additional Information: No value filed.    Anesthesia Type:   General     Note:  Airway :Nasal Cannula  Patient transferred to:Medical/Surgical Unit  Comments: VSS,  PLaced on monitors,  Bed in lowest position, call light in reach.  RN aware  O2 NCHandoff Report: Identifed the Patient, Identified the Reponsible Provider, Reviewed the pertinent medical history, Discussed the surgical course, Reviewed Intra-OP anesthesia mangement and issues during anesthesia, Set expectations for post-procedure period and Allowed opportunity for questions and acknowledgement of understanding      Vitals: (Last set prior to Anesthesia Care Transfer)    CRNA VITALS  6/5/2020 2004 - 6/5/2020 2048 6/5/2020             Pulse:  71    SpO2:  92 %                Electronically Signed By: GILLIAN Millan CRNA  June 5, 2020  8:48 PM   Associated symptoms: no back pain, no diaphoresis, no dysphagia, no fatigue, no fever, no headache, no numbness, no orthopnea, no palpitations, no shortness of breath, not vomiting and no weakness        None       History reviewed  No pertinent past medical history  History reviewed  No pertinent surgical history  History reviewed  No pertinent family history  I have reviewed and agree with the history as documented  E-Cigarette/Vaping     E-Cigarette/Vaping Substances     Social History     Tobacco Use    Smoking status: Never Smoker    Smokeless tobacco: Never Used   Substance Use Topics    Alcohol use: Yes     Frequency: Monthly or less     Drinks per session: 1 or 2     Binge frequency: Never    Drug use: Not Currently       Review of Systems   Constitutional: Negative for appetite change, chills, diaphoresis, fatigue and fever  HENT: Positive for sore throat  Negative for dental problem, drooling, ear discharge, ear pain and trouble swallowing  Respiratory: Positive for cough and chest tightness  Negative for shortness of breath  Cardiovascular: Positive for chest pain  Negative for palpitations, orthopnea and leg swelling  Gastrointestinal: Positive for abdominal pain and nausea  Negative for diarrhea and vomiting  Musculoskeletal: Negative for back pain, neck pain and neck stiffness  Skin: Negative for color change, pallor and rash  Neurological: Negative for weakness, light-headedness, numbness and headaches  All other systems reviewed and are negative  Physical Exam  Physical Exam  Vitals signs and nursing note reviewed  Constitutional:       General: He is not in acute distress  Appearance: He is well-developed  He is obese  He is not ill-appearing, toxic-appearing or diaphoretic  HENT:      Head: Normocephalic and atraumatic  Eyes:      Extraocular Movements: Extraocular movements intact        Conjunctiva/sclera: Conjunctivae normal       Pupils: Pupils are equal, round, and reactive to light  Neck:      Musculoskeletal: Normal range of motion and neck supple  Cardiovascular:      Rate and Rhythm: Normal rate and regular rhythm  Pulses:           Radial pulses are 2+ on the right side and 2+ on the left side  Heart sounds: Normal heart sounds  Pulmonary:      Effort: Pulmonary effort is normal  No accessory muscle usage or respiratory distress  Breath sounds: Normal breath sounds  No decreased breath sounds, wheezing, rhonchi or rales  Chest:      Chest wall: Tenderness present  Comments: Reproducible anterior chest wall tenderness with palpation  Abdominal:      General: Bowel sounds are normal  There is no distension  Palpations: Abdomen is soft  Tenderness: There is abdominal tenderness in the epigastric area and left upper quadrant  Comments: Tenderness to palpation predominantly in the LUQ  No rebound, guarding, or rigidity  Musculoskeletal: Normal range of motion  General: No tenderness  Right lower leg: He exhibits no tenderness  No edema  Left lower leg: He exhibits no tenderness  No edema  Comments: Calves soft, non-tender, non-edematous  No unilateral asymmetry  Skin:     General: Skin is warm and dry  Capillary Refill: Capillary refill takes less than 2 seconds  Neurological:      General: No focal deficit present  Mental Status: He is alert  Mental status is at baseline  Sensory: Sensation is intact  No sensory deficit  Motor: Motor function is intact  No weakness  Gait: Gait is intact     Psychiatric:         Mood and Affect: Mood normal          Vital Signs  ED Triage Vitals   Temperature Pulse Respirations Blood Pressure SpO2   01/13/21 1904 01/13/21 1904 01/13/21 1954 01/13/21 1904 01/13/21 1904   98 4 °F (36 9 °C) 78 18 (!) 183/89 98 %      Temp Source Heart Rate Source Patient Position - Orthostatic VS BP Location FiO2 (%)   01/13/21 1904 01/13/21 1954 01/13/21 1904 01/13/21 1904 --   Oral Monitor Sitting Right arm       Pain Score       01/13/21 1904       7           Vitals:    01/13/21 2000 01/13/21 2030 01/13/21 2100 01/13/21 2200   BP: 137/84 139/87 141/84 143/76   Pulse: 67 67 66 64   Patient Position - Orthostatic VS: Lying Sitting Lying Sitting         Visual Acuity      ED Medications  Medications   Lidocaine Viscous HCl (XYLOCAINE) 2 % mucosal solution 15 mL (15 mL Swish & Swallow Given 1/13/21 1952)   iohexol (OMNIPAQUE) 350 MG/ML injection (SINGLE-DOSE) 100 mL (100 mL Intravenous Given 1/13/21 2154)       Diagnostic Studies  Results Reviewed     Procedure Component Value Units Date/Time    COVID19, Influenza A/B, RSV PCR, [facility] [[id number]]  (Abnormal) Collected: 01/13/21 1944    Lab Status: Final result Specimen: Nares from Nasopharyngeal Swab Updated: 01/13/21 2051     SARS-CoV-2 Positive     INFLUENZA A PCR Negative     INFLUENZA B PCR Negative     RSV PCR Negative    Narrative: This test has been authorized by FDA under an EUA (Emergency Use Assay) for use by authorized laboratories  Clinical caution and judgement should be used with the interpretation of these results with consideration of the clinical impression and other laboratory testing  Testing reported as "Positive" or "Negative" has been proven to be accurate according to standard laboratory validation requirements  All testing is performed with control materials showing appropriate reactivity at standard intervals      Basic metabolic panel [[id number]]  (Abnormal) Collected: 01/13/21 1950    Lab Status: Final result Specimen: Blood from Arm, Right Updated: 01/13/21 2023     Sodium 136 mmol/L      Potassium 4 0 mmol/L      Chloride 97 mmol/L      CO2 30 mmol/L      ANION GAP 9 mmol/L      BUN 18 mg/dL      Creatinine 1 06 mg/dL      Glucose 101 mg/dL      Calcium 8 9 mg/dL      eGFR 78 ml/min/1 73sq m     Narrative:      [place] guidelines for Chronic Kidney Disease (CKD):     Stage 1 with normal or high GFR (GFR > 90 mL/min/1 73 square meters)    Stage 2 Mild CKD (GFR = 60-89 mL/min/1 73 square meters)    Stage 3A Moderate CKD (GFR = 45-59 mL/min/1 73 square meters)    Stage 3B Moderate CKD (GFR = 30-44 mL/min/1 73 square meters)    Stage 4 Severe CKD (GFR = 15-29 mL/min/1 73 square meters)    Stage 5 End Stage CKD (GFR <15 mL/min/1 73 square meters)  Note: GFR calculation is accurate only with a steady state creatinine    Hepatic function panel [[id number]]  (Normal) Collected: 01/13/21 1950    Lab Status: Final result Specimen: Blood from Arm, Right Updated: 01/13/21 2023     Total Bilirubin 0 20 mg/dL      Bilirubin, Direct 0 09 mg/dL      Alkaline Phosphatase 59 U/L      AST 31 U/L      ALT 44 U/L      Total Protein 7 6 g/dL      Albumin 4 1 g/dL     Lipase [[id number]]  (Normal) Collected: 01/13/21 1950    Lab Status: Final result Specimen: Blood from Arm, Right Updated: 01/13/21 2023     Lipase 201 u/L     D-Dimer [[id number]]  (Normal) Collected: 01/13/21 1950    Lab Status: Final result Specimen: Blood from Arm, Right Updated: 01/13/21 2018     D-Dimer, Quant 0 29 ug/ml FEU     Lactic acid [[id number]]  (Normal) Collected: 01/13/21 1950    Lab Status: Final result Specimen: Blood from Arm, Right Updated: 01/13/21 2013     LACTIC ACID 0 6 mmol/L     Narrative:      Result may be elevated if tourniquet was used during collection      Troponin I [[id number]]  (Normal) Collected: 01/13/21 1950    Lab Status: Final result Specimen: Blood from Arm, Right Updated: 01/13/21 2013     Troponin I 0 02 ng/mL     CBC and differential [[id number]]  (Abnormal) Collected: 01/13/21 1950    Lab Status: Final result Specimen: Blood from Arm, Right Updated: 01/13/21 1957     WBC 4 41 Thousand/uL      RBC 4 72 Million/uL      Hemoglobin 13 8 g/dL      Hematocrit 41 5 %      MCV 88 fL      MCH 29 2 pg      MCHC 33 3 g/dL      RDW 12 8 %      MPV 8 8 fL      Platelets 162 Thousands/uL      nRBC 0 /100 WBCs      Neutrophils Relative 49 %      Immat GRANS % 1 %      Lymphocytes Relative 34 %      Monocytes Relative 14 %      Eosinophils Relative 1 %      Basophils Relative 1 %      Neutrophils Absolute 2 21 Thousands/µL      Immature Grans Absolute 0 02 Thousand/uL      Lymphocytes Absolute 1 51 Thousands/µL      Monocytes Absolute 0 60 Thousand/µL      Eosinophils Absolute 0 04 Thousand/µL      Basophils Absolute 0 03 Thousands/µL                  CT abdomen pelvis with contrast   Final Result by Shanice Ochoa MD (01/13 2204)      No evidence of acute intra-abdominal or pelvic pathology            Workstation performed: [id number]         XR chest 1 view portable   ED Interpretation by Geneva Hoff PA-C (01/13 2219)   No acute disease      Final Result by Escobar Lawrence MD (01/14 0946)      No acute cardiopulmonary disease  Workstation performed: [id number]                    Procedures  ECG 12 Lead Documentation Only    Date/Time: 1/13/2021 7:04 PM  Performed by: Geneva Hoff PA-C  Authorized by: Geneva Hoff PA-C     Indications / Diagnosis:  Chest pain  ECG reviewed by me, the ED Provider: yes    Patient location:  ED  Previous ECG:     Previous ECG:  Unavailable  Interpretation:     Interpretation: abnormal    Rate:     ECG rate:  68    ECG rate assessment: normal    Ectopy:     Ectopy: none    QRS:     QRS axis:  Normal    QRS intervals:  Normal  ST segments:     ST segments:  Normal  T waves:     T waves: inverted      Inverted:  V1  Comments:      ECG reviewed by myself and attending physician  No evidence of acute ischemia or infarct  No prior ECG for comparison               ED Course  ED Course as of Jan 14 2244   Wed Jan 13, 2021 2023 Troponin I: 0 02 2023 D-Dimer, Quant: 0 29 2056 SARS-COV-2(!): Positive   2210 CT abd/pelvis IMPRESSION:     No evidence of acute intra-abdominal or pelvic pathology                HEART Risk Score Most Recent Value   Heart Score Risk Calculator   History  0 Filed at: 01/13/2021 2023   ECG  0 Filed at: 01/13/2021 2023   Age  1 Filed at: 01/13/2021 2023   Risk Factors  1 Filed at: 01/13/2021 2023   Troponin  0 Filed at: 01/13/2021 2023   HEART Score  2 Filed at: 01/13/2021 2023                      SBIRT 22yo+      Most Recent Value   SBIRT (25 yo +)   In order to provide better care to our patients, we are screening all of our patients for alcohol and drug use  Would it be okay to ask you these screening questions? Yes Filed at: 01/13/2021 1953   Initial Alcohol Screen: US AUDIT-C    1  How often do you have a drink containing alcohol?  0 Filed at: 01/13/2021 1953   2  How many drinks containing alcohol do you have on a typical day you are drinking? 0 Filed at: 01/13/2021 1953   3a  Male UNDER 65: How often do you have five or more drinks on one occasion? 0 Filed at: 01/13/2021 1953   3b  FEMALE Any Age, or MALE 65+: How often do you have 4 or more drinks on one occassion? 0 Filed at: 01/13/2021 1953   Audit-C Score  0 Filed at: 01/13/2021 1953   PRAVIN: How many times in the past year have you    Used an illegal drug or used a prescription medication for non-medical reasons? Never Filed at: 01/13/2021 1953                    MDM  Number of Diagnoses or Management Options  Atypical chest pain: new and requires workup  COVID-19:   Pharyngitis:   Diagnosis management comments: This is a 53-year-old male with no significant past medical history arriving to the emergency department for evaluation of chest discomfort x 1 week  The patient relates a gradual onset of substernal nonradiating chest discomfort on Thursday, stating that over the weekend he felt that he had possibly pulled a muscle though he denies any known injuries  The patient states that he did have a COVID exposure at work, testing negative last week but states he had a positive rapid test done at work today    The patient also reports a generalized sore throat, denying trismus, painful swallowing or difficulty tolerating oral secretions, neck swelling, neck pain or stiffness, fever or chills  He denies any loss in sensation of taste or smell  The patient denies associated shortness of breath or respiratory concerns  He admits to epigastric/upper abdominal discomfort  x1 day with associated nausea, denying any episodes of vomiting or diarrhea  The patient has no significant personal or family cardiac history  He denies history of DVT/PE  Denies smoking history  Differential diagnosis includes but not limited to: COVID rule out; ACS rule out; doubt PE so will obtain screening D-dimer, pharyngitis, bronchitis, pleuritis, pneumonia, costochondritis, msk pain, esophagitis, gastritis, GERD, PUD, pancreatitis, enteritis, colitis, viral syndrome    Initial ED plan: CXR, labs, CT abd    Final ED Assessment: Vitals stable on arrival with note of elevated BP that improved without intervention Eexam as documented above  Abdominal exam is benign  All labs and imaging independently reviewed by me and interpreted by the radiologist  ECG/troponin negative, D-dimer negative  COVID positive  Chest x-ray without evidence of acute cardiopulmonary process on my read  CT abdomen pelvis reports no evidence of acute intra-abdominal infection  Ambulatory pulse ox 97% without tachycardia  Vitals, exam, and work up reassuring overall  The patient received viscous lidocaine with symptomatic improvement, and encouraged supportive measures for complaint of sore throat  Encouraged vitamin c, vitamin d, and multivitamin supplementation with diagnosis of covid  Patient made aware of need for self quarantine 10 days from symptom onset per cdc guidelines  Return precautions discussed at length with the patient including severe chest pain, worsening sore throat, shortness of breath or respiratory compromise, or any other new or worrisome symptoms    The patient verbalized understanding and is agreeable with disposition plan  He has been monitored in the emergency department for greater than 3 hours without new or worsening symptoms, remaining hemodynamically stable and without evidence of respiratory distress  The patient is stable for discharge home  He had ambulated out of the emergency department without issue  Amount and/or Complexity of Data Reviewed  Clinical lab tests: ordered and reviewed  Tests in the radiology section of CPT®: ordered and reviewed  Review and summarize past medical records: yes  Discuss the patient with other providers: yes  Independent visualization of images, tracings, or specimens: yes    Risk of Complications, Morbidity, and/or Mortality  Presenting problems: moderate  Diagnostic procedures: moderate  Management options: moderate    Patient Progress  Patient progress: stable      Disposition  Final diagnoses:   COVID-19   Atypical chest pain   Pharyngitis     Time reflects when diagnosis was documented in both MDM as applicable and the Disposition within this note     Time User Action Codes Description Comment    1/13/2021 10:19 PM Milly Millin Add [U07 1] COVID-19     1/13/2021 10:19 PM Milly Millin Add [R07 89] Atypical chest pain     1/13/2021 10:19 PM Milly Millin Add [J02 9] Pharyngitis       ED Disposition     ED Disposition Condition Date/Time Comment    Discharge Stable Wed Jan 13, 2021 10:19 PM Afsaneh Miller discharge to home/self care              Follow-up Information     Follow up With Specialties Details Why Contact Info Additional Information    Nicola Ferguson MD Internal Medicine, Palliative Care  As needed [address],  [address] Jeannine Gonzalez [facility] Emergency Department Emergency Medicine  If symptoms worsen [address] [facility] [id number] [facility] Emergency Department, [address] [facility] [address] [place], [place], [address]          There are no discharge medications for this patient  No discharge procedures on file      PDMP Review     None          ED Provider  Electronically Signed by           Jenae Martínez PA-C  01/14/21 9620

## 2021-01-15 ENCOUNTER — HEALTH MAINTENANCE LETTER (OUTPATIENT)
Age: 51
End: 2021-01-15

## 2021-04-20 ENCOUNTER — ANCILLARY PROCEDURE (OUTPATIENT)
Dept: GENERAL RADIOLOGY | Facility: CLINIC | Age: 51
End: 2021-04-20
Attending: FAMILY MEDICINE
Payer: COMMERCIAL

## 2021-04-20 ENCOUNTER — OFFICE VISIT (OUTPATIENT)
Dept: FAMILY MEDICINE | Facility: CLINIC | Age: 51
End: 2021-04-20
Payer: COMMERCIAL

## 2021-04-20 VITALS
HEART RATE: 96 BPM | RESPIRATION RATE: 16 BRPM | BODY MASS INDEX: 43.52 KG/M2 | TEMPERATURE: 98.2 F | OXYGEN SATURATION: 98 % | DIASTOLIC BLOOD PRESSURE: 84 MMHG | WEIGHT: 282 LBS | SYSTOLIC BLOOD PRESSURE: 122 MMHG

## 2021-04-20 DIAGNOSIS — R05.9 COUGH: ICD-10-CM

## 2021-04-20 DIAGNOSIS — J32.9 CHRONIC SINUSITIS, UNSPECIFIED LOCATION: Primary | ICD-10-CM

## 2021-04-20 PROCEDURE — 71046 X-RAY EXAM CHEST 2 VIEWS: CPT | Performed by: RADIOLOGY

## 2021-04-20 PROCEDURE — 99214 OFFICE O/P EST MOD 30 MIN: CPT | Performed by: FAMILY MEDICINE

## 2021-04-20 RX ORDER — ALBUTEROL SULFATE 90 UG/1
2 AEROSOL, METERED RESPIRATORY (INHALATION) EVERY 6 HOURS
Qty: 1 G | Refills: 0 | Status: SHIPPED | OUTPATIENT
Start: 2021-04-20 | End: 2023-05-15

## 2021-04-20 NOTE — PATIENT INSTRUCTIONS
We are working hard to begin vaccinating more people against COVID-19.   Check [employer] or go to [employer] website to check your eligibility and to schedule vaccine appointment.   Please call [phone] to schedule your covid vaccine if you are unable to schedule it through [employer].           Did you know?      You can schedule a video visit for follow-up appointments as well as future appointments for certain conditions.  Please see the below link.     [email or web address]    If you have not already done so,  I encourage you to sign up for [employer] ([email or web address]).  This will allow you to review your results, securely communicate with a provider, and schedule virtual visits as well.

## 2021-04-20 NOTE — PROGRESS NOTES
Assessment & Plan     Chronic sinusitis, unspecified location  Most likely cause of his symptoms.  I am going to refer him to ENT for further evaluation.  I will hold off on further antibiotics at this point.  - OTOLARYNGOLOGY REFERRAL    Cough  He has some cold sensitivity and asthma cannot be ruled out completely.  His weight can also contribute and GERD can contribute.  We will do trial of albuterol and omeprazole while he is waiting to see ENT.  If ENT work-up is negative and if his symptoms are persistent we will involve pulmonology and do PFT also.  He agreed.  -He is having some anxiety and it also can worse his symptoms. Recheck in a month.   - XR Chest 2 Views; Future  - albuterol (PROAIR HFA/PROVENTIL HFA/VENTOLIN HFA) 108 (90 Base) MCG/ACT inhaler; Inhale 2 puffs into the lungs every 6 hours  - omeprazole (PRILOSEC) 20 MG DR capsule; Take 1 capsule (20 mg) by mouth daily    Filled out fmla - in the morning sometime symptom gets worse and harder for him to be on time.  He does not need time off but he wants documentation in case if he shows up late.  We will do it for next 3 months.    Return in about 1 month (around 5/20/2021).    Quoc Flynn MD, MD  [facility]    Jean-Pierre Chu is a 50 year old who presents for the following health issues     HPI     Acute Illness  Acute illness concerns: uri  Onset/Duration: long time now on and off   Symptoms:  Fever: no  Chills/Sweats: YES- sweats  Headache (location?): YES  Sinus Pressure: YES- pressure and drainage   Conjunctivitis:  no  Ear Pain: no  Rhinorrhea: no  Congestion: YES  Sore Throat: YES  Cough: YES-productive of yellow sputum, with shortness of breath, waxing and waning over time  Wheeze: no  Decreased Appetite: no  Nausea: YES  Vomiting: no  Diarrhea: no  Dysuria/Freq.: no  Dysuria or Hematuria: no  Fatigue/Achiness: no  Sick/Strep Exposure: no, and had COVID in May 2020  Therapies tried and outcome:  None    Additional: pt has FMLA forms for work to fill out and having sleeping problems    Coughing and lots of mucus production.   In Dec got antibiotics. It comes and goes.   When goes outside - cold air hits - coughing again.   It all started after covid diagnosis last year.   Last couple of falls - ear infections easily.   Never smoked. Morning is worst. Mucus production worst in the morning.     When gets episodes with cough - hard to show up on work on time.   Some days hard to be on time.     Review of Systems         Objective    /84 (BP Location: Left arm, Patient Position: Sitting, Cuff Size: Adult Large)   Pulse 96   Temp 98.2  F (36.8  C) (Oral)   Resp 16   Wt 127.9 kg (282 lb)   SpO2 98%   BMI 43.52 kg/m    Body mass index is 43.52 kg/m .  Physical Exam

## 2021-04-22 NOTE — TELEPHONE ENCOUNTER
FUTURE VISIT INFORMATION      FUTURE VISIT INFORMATION:    Date: 5/10/2021    Time: 3PM    [place] [facility] [place] INFORMATION:    Referring provider:  Quoc Flynn MD    Referring providers clinic:  [facility]     Reason for visit/diagnosis  Chronic sinusitis, unspecified location per ref by Quoc Acevedo MD in  FAMILY PRAC/IMPEDS. Sick since Covid Dx last year. Records in [facility]. Per Pt     RECORDS REQUESTED FROM:       Clinic name Comments Records Status Imaging Status   [facility]  4/20/2021 note and referral from Quoc Flynn MD Epic

## 2021-05-06 ENCOUNTER — MYC MEDICAL ADVICE (OUTPATIENT)
Dept: OTOLARYNGOLOGY | Facility: CLINIC | Age: 51
End: 2021-05-06

## 2021-05-06 ENCOUNTER — TELEPHONE (OUTPATIENT)
Dept: OTOLARYNGOLOGY | Facility: CLINIC | Age: 51
End: 2021-05-06

## 2021-05-06 NOTE — TELEPHONE ENCOUNTER
Called patient to discuss switching his appointment on Monday 5/10 with Dr. Mckeon from a virtual visit to an in person visit as the provider prefers to have the patient's in clinic.    Patient did not answer his phone and unable to leave a voice mail for patient. Will send a [employer] message and attempt to call patient back at a later time.    Virginia Osullivan LPN  Department of Otolaryngology

## 2021-05-09 ENCOUNTER — HEALTH MAINTENANCE LETTER (OUTPATIENT)
Age: 51
End: 2021-05-09

## 2021-05-10 ENCOUNTER — PRE VISIT (OUTPATIENT)
Dept: OTOLARYNGOLOGY | Facility: CLINIC | Age: 51
End: 2021-05-10

## 2021-05-10 ENCOUNTER — OFFICE VISIT (OUTPATIENT)
Dept: OTOLARYNGOLOGY | Facility: CLINIC | Age: 51
End: 2021-05-10
Attending: FAMILY MEDICINE
Payer: COMMERCIAL

## 2021-05-10 VITALS — WEIGHT: 277.78 LBS | BODY MASS INDEX: 42.86 KG/M2 | TEMPERATURE: 97.3 F | OXYGEN SATURATION: 98 % | HEART RATE: 93 BPM

## 2021-05-10 DIAGNOSIS — J32.4 CHRONIC PANSINUSITIS: Primary | ICD-10-CM

## 2021-05-10 DIAGNOSIS — K21.00 GASTROESOPHAGEAL REFLUX DISEASE WITH ESOPHAGITIS, UNSPECIFIED WHETHER HEMORRHAGE: ICD-10-CM

## 2021-05-10 PROCEDURE — 99203 OFFICE O/P NEW LOW 30 MIN: CPT | Mod: 25 | Performed by: OTOLARYNGOLOGY

## 2021-05-10 PROCEDURE — 31231 NASAL ENDOSCOPY DX: CPT | Performed by: OTOLARYNGOLOGY

## 2021-05-10 RX ORDER — FAMOTIDINE 20 MG/1
20 TABLET, FILM COATED ORAL 2 TIMES DAILY
Qty: 60 TABLET | Refills: 11 | Status: SHIPPED | OUTPATIENT
Start: 2021-05-10

## 2021-05-10 ASSESSMENT — PAIN SCALES - GENERAL: PAINLEVEL: NO PAIN (0)

## 2021-05-10 NOTE — PATIENT INSTRUCTIONS
1. You were seen in the clinic today by Dr. Mckeon    2.   Plan to return to the clinic 3 weeks.    If you have any questions or concerns after your appointment, please call the clinic.    -Clinic phone [phone]. Press option #1 for scheduling related needs. Press option #3 for nurse advice.   -Direct phone [phone]    Virginia Osullivan LPN  [facility]  Department of Otolaryngology

## 2021-05-10 NOTE — LETTER
5/10/2021       RE: Aaron Mercado  [address]  [facility] [address]     Dear Colleague,    Thank you for referring your patient, Aaron Mercado, to the [facility] EAR NOSE AND THROAT CLINIC [place] at [facility]. Please see a copy of my visit note below.    HISTORY OF PRESENT ILLNESS: Vlad is here to see us today for the first time.  He is a 50-year-old gentleman who has had quite a disruptive year.  He reports that he got COVID with associated deep venous thrombosis and then was diagnosed with pneumonia and has since that time had problems with substantial nasal drainage that is discolored and congesting.  He also has what sounds to be some disruptive cough.  He is on lisinopril.  He has not had any recent antibiotics since his treatment for pneumonia.  He does have some nasal congestion.  He denies any significant pain on a day-to-day basis, but at some point, he will get substantial obstruction of the nose and this can then also cause some facial pain.    PAST MEDICAL HISTORY: Noted and reviewed in the chart.     FAMILY HISTORY: Noted and reviewed in the chart.     PAST SURGICAL HISTORY: Noted and reviewed in the chart.     SOCIAL HISTORY: Noted and reviewed in the chart.     REVIEW OF SYSTEMS:  Notable for a recent heartburn-type symptomatology.  Otherwise, pertinent positives as above, otherwise complete review of systems noted in chart.    PHYSICAL EXAMINATION: On exam, this is a 50-year-old gentleman in no acute distress.  Normal mood and.  Normal affect.  Normal ability to communicate.  Alert and appropriate.  Head normocephalic cranial nerve VII is House-Brackmann I/VI bilaterally.  Breathing without any difficulty or stridor.  Eyes are anicteric.  Skin of the head and neck appears normal.    Examination of the mouth shows normal tongue.  Normal buccal mucosa and normal oropharynx.    PROCEDURE: At that point, the nose was sprayed with  lidocaine and Afrin.  Nasal endoscopy was performed.  There were no polyps or purulence, but there is edematous nasal mucosa bilaterally.  Examination of the ears showed normal external auditory canals and tympanic membranes.    ASSESSMENT:  A 50-year-old with what he describes as discolored nasal drainage that is from the right side of the nose more than the left.      PLAN:  At this point, we will plan on a trial of Augmentin for three weeks.  We will follow that with a CT scan.  If his CT is normal and he continues to have symptoms, especially of chronic cough, would recommend a trial of lisinopril holiday, but we will assess that possibility when we see him back in three weeks.          Again, thank you for allowing me to participate in the care of your patient.      Sincerely,    Ajit Mckeon MD

## 2021-05-10 NOTE — PROGRESS NOTES
HISTORY OF PRESENT ILLNESS: Vlad is here to see us today for the first time.  He is a 50-year-old gentleman who has had quite a disruptive year.  He reports that he got COVID with associated deep venous thrombosis and then was diagnosed with pneumonia and has since that time had problems with substantial nasal drainage that is discolored and congesting.  He also has what sounds to be some disruptive cough.  He is on lisinopril.  He has not had any recent antibiotics since his treatment for pneumonia.  He does have some nasal congestion.  He denies any significant pain on a day-to-day basis, but at some point, he will get substantial obstruction of the nose and this can then also cause some facial pain.    PAST MEDICAL HISTORY: Noted and reviewed in the chart.     FAMILY HISTORY: Noted and reviewed in the chart.     PAST SURGICAL HISTORY: Noted and reviewed in the chart.     SOCIAL HISTORY: Noted and reviewed in the chart.     REVIEW OF SYSTEMS:  Notable for a recent heartburn-type symptomatology.  Otherwise, pertinent positives as above, otherwise complete review of systems noted in chart.    PHYSICAL EXAMINATION: On exam, this is a 50-year-old gentleman in no acute distress.  Normal mood and.  Normal affect.  Normal ability to communicate.  Alert and appropriate.  Head normocephalic cranial nerve VII is House-Brackmann I/VI bilaterally.  Breathing without any difficulty or stridor.  Eyes are anicteric.  Skin of the head and neck appears normal.    Examination of the mouth shows normal tongue.  Normal buccal mucosa and normal oropharynx.    PROCEDURE: At that point, the nose was sprayed with lidocaine and Afrin.  Nasal endoscopy was performed.  There were no polyps or purulence, but there is edematous nasal mucosa bilaterally.  Examination of the ears showed normal external auditory canals and tympanic membranes.    ASSESSMENT:  A 50-year-old with what he describes as discolored nasal drainage that is from the right  side of the nose more than the left.      PLAN:  At this point, we will plan on a trial of Augmentin for three weeks.  We will follow that with a CT scan.  If his CT is normal and he continues to have symptoms, especially of chronic cough, would recommend a trial of lisinopril holiday, but we will assess that possibility when we see him back in three weeks.

## 2021-05-10 NOTE — TELEPHONE ENCOUNTER
Received call back from patient who advised that he will come into clinic for today's appointment. Gave patient the address for the clinic. Confirmed with patient that his appointment time is 3:00 pm, however he should arrive 15 minutes early to get registered. Patient verbalized understanding of these instructions and advises that he will see us in clinic this afternoon.    Virginia Osullivan LPN  Department of Otolaryngology

## 2021-05-10 NOTE — NURSING NOTE
Chief Complaint   Patient presents with     Consult     chronic sinusitis     Pulse 93, temperature 97.3  F (36.3  C), weight 126 kg (277 lb 12.5 oz), SpO2 98 %.    Duke Reyes LPN

## 2021-06-07 ENCOUNTER — ANCILLARY PROCEDURE (OUTPATIENT)
Dept: CT IMAGING | Facility: CLINIC | Age: 51
End: 2021-06-07
Attending: OTOLARYNGOLOGY
Payer: COMMERCIAL

## 2021-06-07 ENCOUNTER — OFFICE VISIT (OUTPATIENT)
Dept: OTOLARYNGOLOGY | Facility: CLINIC | Age: 51
End: 2021-06-07
Payer: COMMERCIAL

## 2021-06-07 DIAGNOSIS — J32.4 CHRONIC PANSINUSITIS: ICD-10-CM

## 2021-06-07 DIAGNOSIS — R09.82 PND (POST-NASAL DRIP): Primary | ICD-10-CM

## 2021-06-07 PROCEDURE — 70486 CT MAXILLOFACIAL W/O DYE: CPT | Mod: GC | Performed by: RADIOLOGY

## 2021-06-07 PROCEDURE — 99213 OFFICE O/P EST LOW 20 MIN: CPT | Performed by: OTOLARYNGOLOGY

## 2021-06-07 RX ORDER — FLUTICASONE PROPIONATE 50 MCG
2 SPRAY, SUSPENSION (ML) NASAL DAILY
Qty: 16 ML | Refills: 6 | Status: SHIPPED | OUTPATIENT
Start: 2021-06-07 | End: 2021-07-07

## 2021-06-07 ASSESSMENT — PAIN SCALES - GENERAL: PAINLEVEL: MILD PAIN (3)

## 2021-06-07 NOTE — PATIENT INSTRUCTIONS
1. You were seen in the clinic today by Dr. Mckeon.    2.   Plan to return to the clinic in 1 month.    If you have any questions or concerns after your appointment, please call the clinic.    -Clinic phone [phone]. Press option #1 for scheduling related needs. Press option #3 for nurse advice.   -Direct phone [phone]    Virginia Osullivan LPN  [facility]  Department of Otolaryngology

## 2021-06-07 NOTE — LETTER
6/7/2021       RE: Aaron Mercado  [address]  [facility] [address]     Dear Colleague,    Thank you for referring your patient, Aaron Mercado, to the [facility] EAR NOSE AND THROAT CLINIC [place] at [facility]. Please see a copy of my visit note below.    HISTORY OF PRESENT ILLNESS:  The patient is here to see us again today.  He had some improvement with antibiotics, but symptoms have recurred.  He mostly has a sense of thick drainage down the back of his throat causing cough.      IMAGING:  Today, we reviewed his CT scan which does demonstrate scattered sinus disease throughout primarily the ethmoid sinuses with some ostiomeatal complex obstruction on the right side.    ASSESSMENT AND PLAN:  At this point, the patient is fairly convinced that mucus is the cause of his cough and not his lisinopril.  We will go ahead with a consistent round of Flonase for a month.  If at that time he continues to have symptoms, we could consider bilateral FESS as the next option as these symptoms have been recalcitrant and going on for a long time.    Twenty minutes were spent with the patient today on chart review and documentation.          Again, thank you for allowing me to participate in the care of your patient.      Sincerely,    Ajit Mckeon MD

## 2021-06-07 NOTE — PROGRESS NOTES
HISTORY OF PRESENT ILLNESS:  The patient is here to see us again today.  He had some improvement with antibiotics, but symptoms have recurred.  He mostly has a sense of thick drainage down the back of his throat causing cough.      IMAGING:  Today, we reviewed his CT scan which does demonstrate scattered sinus disease throughout primarily the ethmoid sinuses with some ostiomeatal complex obstruction on the right side.    ASSESSMENT AND PLAN:  At this point, the patient is fairly convinced that mucus is the cause of his cough and not his lisinopril.  We will go ahead with a consistent round of Flonase for a month.  If at that time he continues to have symptoms, we could consider bilateral FESS as the next option as these symptoms have been recalcitrant and going on for a long time.    Twenty minutes were spent with the patient today on chart review and documentation.

## 2021-06-29 ENCOUNTER — MYC MEDICAL ADVICE (OUTPATIENT)
Dept: FAMILY MEDICINE | Facility: CLINIC | Age: 51
End: 2021-06-29

## 2021-06-30 NOTE — TELEPHONE ENCOUNTER
Writer responded via [employer].    RADHA DiamondN, RN  [facility]

## 2021-07-12 ENCOUNTER — OFFICE VISIT (OUTPATIENT)
Dept: OTOLARYNGOLOGY | Facility: CLINIC | Age: 51
End: 2021-07-12
Payer: COMMERCIAL

## 2021-07-12 VITALS — WEIGHT: 277 LBS | HEIGHT: 68 IN | BODY MASS INDEX: 41.98 KG/M2

## 2021-07-12 DIAGNOSIS — J31.0 CHRONIC RHINITIS: Primary | ICD-10-CM

## 2021-07-12 PROCEDURE — 99213 OFFICE O/P EST LOW 20 MIN: CPT | Performed by: OTOLARYNGOLOGY

## 2021-07-12 ASSESSMENT — PAIN SCALES - GENERAL: PAINLEVEL: NO PAIN (0)

## 2021-07-12 ASSESSMENT — MIFFLIN-ST. JEOR: SCORE: 2100.96

## 2021-07-12 NOTE — PROGRESS NOTES
HISTORY OF PRESENT ILLNESS:  Vlad is back to see us again today.  He feels that things are going better with regards to his nasal function.  He is breathing well through his nose.  His sense of smell is improving somewhat.  His sense of postnasal drainage and cough is improved.  He has been using the Flonase with consistency over the last month.    PHYSICAL EXAMINATION:  This is a 50-year-old gentleman in no acute distress.  Normal mood and affect.  Normal ability to communicate.  Alert and appropriate.  Head is normocephalic.  Cranial nerve VII is House-Brackmann I/VI bilaterally.  Breathing without difficulty or stridor.  Eyes are anicteric.  Skin of the head and neck appears normal.  Examination of the nose is performed and shows healthy-appearing nasal mucosa without polyps, purulence or substantial obstruction.    ASSESSMENT:  50-year-old with chronic rhinitis and a history of disruptive cough.  At this point, things have improved with regular Flonase use.  Recommend continued regular Flonase use and follow up in 2 months.

## 2021-07-12 NOTE — PATIENT INSTRUCTIONS
1. You were seen in the clinic today by Dr. Mckeon.    2.   Plan to return the clinic in 2 months.     If you have any questions or concerns after your appointment, please call the clinic.    -Clinic phone [phone]. Press option #1 for scheduling related needs. Press option #3 for nurse advice.     Virginia Osullivan LPN  [phone]    Zonia Callejas, RNCARMEN  [phone]    [facility]  Department of Otolaryngology

## 2021-07-12 NOTE — NURSING NOTE
"Chief Complaint   Patient presents with     RECHECK     3-4 week follow up       Height 1.727 m (5' 8\"), weight 125.6 kg (277 lb).    Gabriela Bob, EMT  "

## 2021-07-12 NOTE — LETTER
7/12/2021       RE: Aaron Mercado  [address]  [facility] [address]     Dear Colleague,    Thank you for referring your patient, Aaron Mercado, to the [facility] EAR NOSE AND THROAT CLINIC [place] at [facility]. Please see a copy of my visit note below.    HISTORY OF PRESENT ILLNESS:  Vlad is back to see us again today.  He feels that things are going better with regards to his nasal function.  He is breathing well through his nose.  His sense of smell is improving somewhat.  His sense of postnasal drainage and cough is improved.  He has been using the Flonase with consistency over the last month.    PHYSICAL EXAMINATION:  This is a 50-year-old gentleman in no acute distress.  Normal mood and affect.  Normal ability to communicate.  Alert and appropriate.  Head is normocephalic.  Cranial nerve VII is House-Brackmann I/VI bilaterally.  Breathing without difficulty or stridor.  Eyes are anicteric.  Skin of the head and neck appears normal.  Examination of the nose is performed and shows healthy-appearing nasal mucosa without polyps, purulence or substantial obstruction.    ASSESSMENT:  50-year-old with chronic rhinitis and a history of disruptive cough.  At this point, things have improved with regular Flonase use.  Recommend continued regular Flonase use and follow up in 2 months.          Again, thank you for allowing me to participate in the care of your patient.      Sincerely,    Ajit Mckeon MD

## 2021-08-02 ENCOUNTER — MYC MEDICAL ADVICE (OUTPATIENT)
Dept: FAMILY MEDICINE | Facility: CLINIC | Age: 51
End: 2021-08-02

## 2021-10-24 ENCOUNTER — HEALTH MAINTENANCE LETTER (OUTPATIENT)
Age: 51
End: 2021-10-24

## 2021-11-10 ENCOUNTER — OFFICE VISIT (OUTPATIENT)
Dept: FAMILY MEDICINE | Facility: CLINIC | Age: 51
End: 2021-11-10
Payer: COMMERCIAL

## 2021-11-10 VITALS
RESPIRATION RATE: 16 BRPM | HEART RATE: 84 BPM | DIASTOLIC BLOOD PRESSURE: 100 MMHG | OXYGEN SATURATION: 96 % | BODY MASS INDEX: 43.33 KG/M2 | TEMPERATURE: 98.3 F | SYSTOLIC BLOOD PRESSURE: 148 MMHG | WEIGHT: 285 LBS

## 2021-11-10 DIAGNOSIS — E66.01 MORBID OBESITY (H): ICD-10-CM

## 2021-11-10 DIAGNOSIS — I10 HYPERTENSION GOAL BP (BLOOD PRESSURE) < 140/90: ICD-10-CM

## 2021-11-10 DIAGNOSIS — E11.9 TYPE 2 DIABETES MELLITUS WITHOUT COMPLICATION, WITHOUT LONG-TERM CURRENT USE OF INSULIN (H): Primary | ICD-10-CM

## 2021-11-10 DIAGNOSIS — M54.50 ACUTE BILATERAL LOW BACK PAIN WITHOUT SCIATICA: ICD-10-CM

## 2021-11-10 DIAGNOSIS — F33.1 MAJOR DEPRESSIVE DISORDER, RECURRENT EPISODE, MODERATE (H): ICD-10-CM

## 2021-11-10 PROBLEM — Z86.718 PERSONAL HISTORY OF DVT (DEEP VEIN THROMBOSIS): Status: ACTIVE | Noted: 2020-06-05

## 2021-11-10 PROBLEM — I74.3 FEMORAL POPLITEAL ARTERY THROMBUS (H): Status: RESOLVED | Noted: 2020-06-06 | Resolved: 2021-11-10

## 2021-11-10 LAB — HBA1C MFR BLD: 6.1 % (ref 0–5.6)

## 2021-11-10 PROCEDURE — 36415 COLL VENOUS BLD VENIPUNCTURE: CPT | Performed by: FAMILY MEDICINE

## 2021-11-10 PROCEDURE — 83036 HEMOGLOBIN GLYCOSYLATED A1C: CPT | Performed by: FAMILY MEDICINE

## 2021-11-10 PROCEDURE — 99214 OFFICE O/P EST MOD 30 MIN: CPT | Performed by: FAMILY MEDICINE

## 2021-11-10 PROCEDURE — 80061 LIPID PANEL: CPT | Performed by: FAMILY MEDICINE

## 2021-11-10 PROCEDURE — 80053 COMPREHEN METABOLIC PANEL: CPT | Performed by: FAMILY MEDICINE

## 2021-11-10 PROCEDURE — 82043 UR ALBUMIN QUANTITATIVE: CPT | Performed by: FAMILY MEDICINE

## 2021-11-10 RX ORDER — LISINOPRIL 5 MG/1
5 TABLET ORAL DAILY
Qty: 90 TABLET | Refills: 1 | Status: CANCELLED | OUTPATIENT
Start: 2021-11-10

## 2021-11-10 RX ORDER — LOSARTAN POTASSIUM 50 MG/1
TABLET ORAL
Qty: 35 TABLET | Refills: 0 | Status: SHIPPED | OUTPATIENT
Start: 2021-11-10 | End: 2023-05-15

## 2021-11-10 RX ORDER — LOVASTATIN 10 MG
10 TABLET ORAL AT BEDTIME
Qty: 90 TABLET | Refills: 1 | Status: SHIPPED | OUTPATIENT
Start: 2021-11-10 | End: 2021-11-15

## 2021-11-10 RX ORDER — CYCLOBENZAPRINE HCL 5 MG
5-10 TABLET ORAL 3 TIMES DAILY PRN
Qty: 21 TABLET | Refills: 0 | Status: SHIPPED | OUTPATIENT
Start: 2021-11-10 | End: 2023-05-15

## 2021-11-10 NOTE — PATIENT INSTRUCTIONS
Did you know?      You can schedule a video visit for follow-up appointments as well as future appointments for certain conditions.  Please see the below link.     [email or web address]    If you have not already done so,  I encourage you to sign up for [employer] ([email or web address]).  This will allow you to review your results, securely communicate with a provider, and schedule virtual visits as well.

## 2021-11-10 NOTE — PROGRESS NOTES
Assessment & Plan     Type 2 diabetes mellitus without complication, without long-term current use of insulin (H)  His A1c has not been over 6.5 but due to persistently higher glucose reading he has a diagnosis of diabetes.  His A1c is slightly worse than the last time and we agreed to resume same dose of Metformin.  - metFORMIN (GLUCOPHAGE) 500 MG tablet; TAKE 1 TABLET BY MOUTH every other day WITH DINNER  - lovastatin (MEVACOR) 10 MG tablet; Take 1 tablet (10 mg) by mouth At Bedtime  - REVIEW OF HEALTH MAINTENANCE PROTOCOL ORDERS  - HEMOGLOBIN A1C; Future  - Lipid panel reflex to direct LDL Fasting; Future  - Albumin Random Urine Quantitative with Creat Ratio; Future  - Comprehensive metabolic panel (BMP + Alb, Alk Phos, ALT, AST, Total. Bili, TP); Future  - HEMOGLOBIN A1C  - Lipid panel reflex to direct LDL Fasting  - Albumin Random Urine Quantitative with Creat Ratio  - Comprehensive metabolic panel (BMP + Alb, Alk Phos, ALT, AST, Total. Bili, TP)    Hypertension goal BP (blood pressure) < 140/90  His blood pressure is high and he is also having some ringing in ear.  Not sure if it is related but we can start him on antihypertensive medication.  His lisinopril may be contributing to his cough and it has been stopped.  We will switch it to losartan.  Start with 25 mg and increase it to 50.  1 month mainly for blood pressure recheck.  - losartan (COZAAR) 50 MG tablet; Take 0.5 tablets (25 mg) by mouth daily for 10 days, THEN 1 tablet (50 mg) daily.    Acute bilateral low back pain without sciatica  Currently feeling better but would like to get muscle accident due to frequent flare.  Muscle relaxer helps him a lot.  Okay to use it as needed.  - cyclobenzaprine (FLEXERIL) 5 MG tablet; Take 1-2 tablets (5-10 mg) by mouth 3 times daily as needed for muscle spasms    Major depressive disorder, recurrent episode, moderate (H)  Feels with current multiple stressors mood is somewhat down.  Agrees to see a therapist.   "Not ready for medication yet.  - MENTAL HEALTH REFERRAL  - Adult; Outpatient Treatment; Individual/Couples/Family/Group Therapy/Health Psychology; [facility] [phone]; We will contact you to schedule the appointment or please call with any questions; Future    Morbid obesity (H)  Body mass index is 43.33 kg/m .  Adversely contribute to all of above diagnoses.  Continue to work on lifestyle.  Consider weight loss clinic referral in future.               BMI:   Estimated body mass index is 43.33 kg/m  as calculated from the following:    Height as of 7/12/21: 1.727 m (5' 8\").    Weight as of this encounter: 129.3 kg (285 lb).           1 month follow-up for blood pressure check MA only.  3 months follow-up with me.    Quoc Flynn MD, MD  [facility]    Jean-Pierre Chu is a 51 year old who presents for the following health issues     HPI     Concern - ringing in ears  Onset: while, 3-4 weeks  Description: ringing in both ears constantly, works in cooler with loud running fans  Intensity: moderate  Progression of Symptoms:  worsening  Accompanying Signs & Symptoms: inflammation in sinus going down, no sinus problem only drainage this morning, breathing is okay   Previous history of similar problem: none  Precipitating factors:        Worsened by: none  Alleviating factors:        Improved by: none  Therapies tried and outcome:  none     Diabetes Follow-up      How often are you checking your blood sugar? Not at all    What concerns do you have today about your diabetes? None     Do you have any of these symptoms? (Select all that apply)  No numbness or tingling in feet.  No redness, sores or blisters on feet.  No complaints of excessive thirst.  No reports of blurry vision.  No significant changes to weight.    Have you had a diabetic eye exam in the last 12 months? No        BP Readings from Last 2 Encounters:   11/10/21 (!) 148/100   04/20/21 122/84 "     Hemoglobin A1C (%)   Date Value   11/10/2021 6.1 (H)   11/04/2020 5.9 (H)   10/29/2019 5.9 (H)     LDL Cholesterol Calculated (mg/dL)   Date Value   11/04/2020 135 (H)   02/08/2018 132 (H)             Hypertension Follow-up      Do you check your blood pressure regularly outside of the clinic? No     Are you following a low salt diet? No    Are your blood pressures ever more than 140 on the top number (systolic) OR more   than 90 on the bottom number (diastolic), for example 140/90? No    Some sinus pressure.  No breathing issue.   Ears are ringing. Not using qtips. Milder always been there but more lately.   No room spinning sensation.   Lisinopril has been on hold due to cough by ENT.  Had a can of soup before arriving and felt it may be affecting his blood pressure.     Back spasm - would like to get flexeril refill. It was flared and lasted for 2 weeks but now feeling better. Would like to keep muscle relaxar handy.   History of leg blood clot.     Depression - mood is OK. Last year was tough. Thought about going back to therapist.     Out of metformin, lovastatin for couple of months. Admits diet is not great.     Review of Systems         Objective    BP (!) 148/100 (BP Location: Right arm, Patient Position: Sitting, Cuff Size: Adult Large)   Pulse 84   Temp 98.3  F (36.8  C) (Oral)   Resp 16   Wt 129.3 kg (285 lb)   SpO2 96%   BMI 43.33 kg/m    Body mass index is 43.33 kg/m .  Physical Exam

## 2021-11-11 LAB
ALBUMIN SERPL-MCNC: 3.7 G/DL (ref 3.4–5)
ALP SERPL-CCNC: 117 U/L (ref 40–150)
ALT SERPL W P-5'-P-CCNC: 47 U/L (ref 0–70)
ANION GAP SERPL CALCULATED.3IONS-SCNC: 6 MMOL/L (ref 3–14)
AST SERPL W P-5'-P-CCNC: 27 U/L (ref 0–45)
BILIRUB SERPL-MCNC: 0.5 MG/DL (ref 0.2–1.3)
BUN SERPL-MCNC: 20 MG/DL (ref 7–30)
CALCIUM SERPL-MCNC: 9.2 MG/DL (ref 8.5–10.1)
CHLORIDE BLD-SCNC: 107 MMOL/L (ref 94–109)
CHOLEST SERPL-MCNC: 194 MG/DL
CO2 SERPL-SCNC: 25 MMOL/L (ref 20–32)
CREAT SERPL-MCNC: 0.84 MG/DL (ref 0.66–1.25)
CREAT UR-MCNC: 133 MG/DL
FASTING STATUS PATIENT QL REPORTED: NO
GFR SERPL CREATININE-BSD FRML MDRD: >90 ML/MIN/1.73M2
GLUCOSE BLD-MCNC: 97 MG/DL (ref 70–99)
HDLC SERPL-MCNC: 33 MG/DL
LDLC SERPL CALC-MCNC: 145 MG/DL
MICROALBUMIN UR-MCNC: 9 MG/L
MICROALBUMIN/CREAT UR: 6.77 MG/G CR (ref 0–17)
NONHDLC SERPL-MCNC: 161 MG/DL
POTASSIUM BLD-SCNC: 4.3 MMOL/L (ref 3.4–5.3)
PROT SERPL-MCNC: 7.9 G/DL (ref 6.8–8.8)
SODIUM SERPL-SCNC: 138 MMOL/L (ref 133–144)
TRIGL SERPL-MCNC: 79 MG/DL

## 2021-11-15 DIAGNOSIS — E11.9 TYPE 2 DIABETES MELLITUS WITHOUT COMPLICATION, WITHOUT LONG-TERM CURRENT USE OF INSULIN (H): ICD-10-CM

## 2021-11-15 RX ORDER — LOVASTATIN 20 MG
20 TABLET ORAL AT BEDTIME
Qty: 90 TABLET | Refills: 1 | Status: SHIPPED | OUTPATIENT
Start: 2021-11-15 | End: 2023-05-15

## 2022-02-13 ENCOUNTER — HEALTH MAINTENANCE LETTER (OUTPATIENT)
Age: 52
End: 2022-02-13

## 2022-03-23 ENCOUNTER — MYC MEDICAL ADVICE (OUTPATIENT)
Dept: FAMILY MEDICINE | Facility: CLINIC | Age: 52
End: 2022-03-23
Payer: COMMERCIAL

## 2022-03-23 NOTE — TELEPHONE ENCOUNTER
Writer responded via [employer].    RADHA DiamondN, RN  [facility]

## 2022-05-05 ENCOUNTER — MYC MEDICAL ADVICE (OUTPATIENT)
Dept: FAMILY MEDICINE | Facility: CLINIC | Age: 52
End: 2022-05-05
Payer: COMMERCIAL

## 2022-05-09 NOTE — TELEPHONE ENCOUNTER
Writer responded via [employer].    RADHA DiamondN, RN  [facility]

## 2022-05-10 NOTE — TELEPHONE ENCOUNTER
Should have some sort of visit. Virtual visit with me is fine. If cant wait or cant get in - see first available provider.  Also due for diabetes and blood pressure follow up and he should schedule that

## 2022-05-10 NOTE — TELEPHONE ENCOUNTER
"Dr. Flynn-Please review message thread.  Writer would typically recommend eVisit, at the least, to address these types of symptoms.    \"My nose is running so much the drainage is getting me sick. I think I need some antibiotics\"    \"Hi Vlad,     How are you feeling today?  Any better, the same or worsening?     How long have you had the runny nose?     Any fever, cough, body aches, fatigue, headache?     When did you last test yourself for COVID-19?          Please let us know how you are doing.     Take care,  HA Diamond, RN  [facility]\"    \"It's from my inflamed sinuses. If ya ask DR Flynn about it he's aware of it. I need antibiotics to clear it up. The drainage is excessive. We've dealt with it before. \"    Thank you!  HA Diamond, RN  [facility]      "

## 2022-05-10 NOTE — TELEPHONE ENCOUNTER
Writer responded via [employer].    RADHA DiamondN, RN  [facility]

## 2022-06-05 ENCOUNTER — HEALTH MAINTENANCE LETTER (OUTPATIENT)
Age: 52
End: 2022-06-05

## 2022-10-06 NOTE — PLAN OF CARE
"Problem: Patient Care Overview  Goal: Plan of Care/Patient Progress Review  Outcome: Improving    VS:   BP (!) 137/94 (BP Location: Right arm)  Pulse 84  Temp 100.6  F (38.1  C) (Oral)  Resp 18  Wt 119.3 kg (263 lb 1.6 oz)  SpO2 94%  BMI 41.21 kg/m2 Temp elevated, given tylenol - otherwise no abnormalities noted. No reports of SOB or CP. LS clear equal bilaterally, on room air.    Output:   Voids spontaneously w/out difficulty. Last BM 11/19/18, passing gas   Activity:   Standby assist    Skin: Intact w/ the exception of infection of L foot    Pain:   Moderate, manageable w/ tramadol 50mg q6h. Has available tylenol 650mg q4h and Ibuprofen 600mg q6h     Neuro/CMS:   No numbness/tingling reported   Dressing(s):   None    Diet:   Regular, tolerating well. No N&V reported   Equipment:      IV's/Drains:   PIV L hand: infusing 100mL/hr, CDI     Plan:   Continue to monitor    Additional Info:   Pt started to notice hives developing on forearms, recheck and per pt statement, \"getting better and not as bad as before.\" Will continue to monitor   Triggered lactic acid @ change of shift - lactic 0.6         "
"Problem: Patient Care Overview  Goal: Plan of Care/Patient Progress Review  Outcome: Therapy, progress toward functional goals as expected  Pt doing better little by little. Able to bend L knee better. Swelling is decreasing. Pt keeps foot elevated except for when he has to go to bathroom. Pain is better, manageable. Pt's main complaint is the boredom. Pt said he just doesn't want visitors \"staring\" at him. Otherwise pt is doing well.       "
Problem: Diabetes Comorbidity  Intervention: Optimize Glycemic Control    VSS. CMS/neuros intact. Denies headache, SOB, nausea, vomiting.  LS clear, equal bilaterally. BS audible and presents in all quadrants. Passing flatus. Last BM 11/17.  Voids spontaneously without difficulty. Ambulates with SBA to the bathroom.  Diet: Regular. Carb count initiated. BGs closely monitored and corrected per protocol. Normal saline infusing at 100cc/hr.  Pain mgmt: Endorses pain in LLE and right shoulder. Managed with PRN tylenol but states pain is uncontrolled with tylenol.   He received a 5mg oxycodone in the ED and wanted to know if he could have it again. Provider paged, one-time dose of 50mg Tramadol acknowledged and administered.  Able to make needs known. Continue POC.  
Problem: Patient Care Overview  Goal: Plan of Care/Patient Progress Review  1505: pt triggers sepsis protocol for a temperature of 100.5, , PRN ibuprofen given and lactic acid lab draw ordered, upcomming nurse notified and awaiting lab result.      
Problem: Patient Care Overview  Goal: Plan of Care/Patient Progress Review  Outcome: Adequate for Discharge Date Met: 11/24/18  Alert and oriented. VSS with intermittently elevated BPs, provider aware. C/o left leg and right shoulder discomfort, managed with prn ibuprofen. LLE reddened and swollen within marked area, appears similar to yesterday. Tolerating regular diet and fluids with no N/V. Voiding spontaneously without difficulty. Bowel sounds active, pt states last BM was yesterday 11/23. Up independently in room and ambulating halls. Pt received dose of IV vanco prior to discharging today.     Pt discharged at approximately 1500. Pt received discharge instructions and verbalized understanding and stated no further questions. Pt received discharge medications. PIV removed prior to discharge. Pt declined wheel chair ride to exit. Pt stated he is driving himself home from hospital.        
Problem: Patient Care Overview  Goal: Plan of Care/Patient Progress Review  Outcome: Improving        VS:   /90; pressures have been variable.  Patient reported feeling like he was burning up.  Temp was 99.4.  Patient requested ibuprofen, which was administered.  Lung sounds clear   Output:   Voiding without difficulty.  Patient reports last BM this morning   Activity:   Up with standby assist   Skin: Left knee and leg blanchable redness.  Abrasions and scabs on feet.   Pain:   Patient denies   Neuro/CMS:   Intact; patient denies numbness and/or tingling   Dressing(s):   None   Diet:   Consistent carb/diabetic diet   LDA:   PIV saline locked; Site noted to be light pink, still patent   Equipment:   None   Plan:   Continue to monitor and follow plan of care.  Patient able to make needs known.   Additional Info:            
Problem: Patient Care Overview  Goal: Plan of Care/Patient Progress Review  Outcome: Improving    VS:   VSS    Alert and oriented x4     @ 1745 & 171 @ 2130   Output: Voiding adequate amounts of urine in toilet   Last BM: 11/19/18   Activity: Ambulated to bathroom with SBA   Skin:   LLE redness from knee downwards    Scabs present on LLE    Pain: L knee pain controlled with prn ibuprofen and tramadol   CMS: Intact; denies numbness/tingling    Diet: Regular; fair appetite   LDA: L PIV infusing NS @ 100 mL/hr; WDL    Plan: Continue IV abx and fluids    Additional Info: Patient cooperative with nursing cares. Able to make needs known.            
Problem: Patient Care Overview  Goal: Plan of Care/Patient Progress Review  Outcome: Improving  Alert and oriented X 4. Able to make needs known. Call light in reach. VSS. Afebrile.  Left lower leg red and swollen but patient reports that it has improved.  Ice applied to knee. CMS/intact, no numbness or tingling. Up ad gustavo, ambulated to BR. Continent of bowel and bladder. PIV patent, IV fluids infusing as ordered. 0200 BG was 109.  Appears to be sleeping most of night. Continue with plan of care.       
Problem: Patient Care Overview  Goal: Plan of Care/Patient Progress Review  Outcome: Improving  Alert and oriented. BPs elevated intermittently, pt received scheduled Lisinopril. Otherwise VSS. C/o L left and R shoulder pain managed with prn ibuprofen (see eMAR) and heat packs. LLE reddened and swollen within marked area. Tolerating regular diet and fluids with no N/V. Voiding spontaneously without difficulty. Bowel sounds active, last BM 11/22 per pt. Up independently in room. IV saline locked in between antibiotics. Able to make needs known. Continue with POC.   
Problem: Patient Care Overview  Goal: Plan of Care/Patient Progress Review  Outcome: Improving  Alert,orientated x3.LLE marked redness,unchanged..Denies numbness/tingling sensation.Swelling better and pt is compliant elevating with pillows when on bed.Ibuprofren given for pain management.Remains on IV abx vanco/ceftriaxone.IV fluid NS running at 100ml/hr.New PIV line on L forearm.SBA1.Voiding.Passing gas,last BM yesterday.Calls to make needs known.      
Problem: Patient Care Overview  Goal: Plan of Care/Patient Progress Review  Outcome: No Change        VS:   /90 (BP Location: Right arm)  Pulse 77  Temp 97  F (36.1  C) (Oral)  Resp 16  Wt 119.3 kg (263 lb 1.6 oz)  SpO2 97%  BMI 41.21 kg/m2     Output:   Void spontaneously, last BM 11/21/18   Lungs Clear. O2 stats 90% on room air.    Activity:   SBA/Independent    Skin: Intact. LLE Red, swollen with scabs post injury    Pain:   Well controlled with Ultram 50mg    Neuro/CMS:   A&O x 4. Patient denies any new N/T   Dressing(s):   None    Diet:   Regular    LDA:   PIV L  - TKO 30ml/hr    Equipment:   IV pole   Plan:   Continue antibiotics.    Additional Info:   Patient BS has been stable, no Insulin administered this shift. Vanco can stay on schedule next dose at 1am per Pharmacy            
Problem: Patient Care Overview  Goal: Plan of Care/Patient Progress Review  Outcome: No Change        VS:   VSS. Denies chest pain SOB. Pt anxious about infection. Temp 99.4 pt complains of chills but then goes away   Output:   Voiding spontaneously, BM 11/22   Activity:   Pt in bed most of shift, Independent in room   Skin: LLE red, warm, blanchable, edema +1. Scabbing noted on top ankle area. Area marked, for redness. Paremeter increased through out shift notified MD. Area marked with new area.   Pain:   Complaints of LLE pain, PRN ADVIL given ULTRA available   Neuro/CMS:   Pt denies n/t sensation in left knee comes and goes with swelling   Dressing(s):   none   Diet:   reg   LDA:   PIv running TKO NS   Equipment:   Iv pole/pump   Plan:   Cont. To monitor, IV Vancomyocin   Additional Info:            
Problem: Patient Care Overview  Goal: Plan of Care/Patient Progress Review  Outcome: No Change    VS: Temp: 97  F (36.1  C) Temp src: Oral BP: 135/88 Pulse: 82 Heart Rate: 68 Resp: 18 SpO2: 100 % O2 Device: None (Room air)       Output: Voiding spontaneously without difficulty. Last BM 11/22   Activity: Ambulates in room and good independently.    Skin: LLE red, warm, blanchable, edema +1. Redness and swelling remain within marked boarders.    Pain: Complaints of LLE pain that is partially relieved with PRN ibuprofen.    Neuro/CMS: Intact. Denies numbness/tingling. Alert and oriented X4.    Dressing(s): none   Diet: regular   LDA: PIV saline locked between abx admin.    Equipment: IV pump/pole.    Plan: Able to make needs known. Will continue with plan of care.            
Problem: Patient Care Overview  Goal: Plan of Care/Patient Progress Review  Outcome: No Change  Alert and oriented X 4. Able to make needs known. Call light in reach. C/O left leg pain. Received Ibuprofen 600 mg with partial relief. Ice applied. No CMS/neuro changes. Fair tolerance to activity.  Up to BR with SBA, continent  of bowel and bladder. PIV patent, IV Vanco infusing without difficulty. Appears to be sleeping during rounds. Continue with plan of care.       
Problem: Patient Care Overview  Goal: Plan of Care/Patient Progress Review  Outcome: No Change  Note for the night shift.  D: Pt slept all night except when awakened for cares. No complaints of pain. Did want Motrin this AM to help with the inflammation. Up independently to the bathroom. Redness in the left thigh unchanged form midnight. Thigh is bright red and lower leg is duller red and the knee area dull red/grayish/dusky. Left leg elevated on pillows. Call light with in reach.Able to make needs known. A: Doing OK.P: Monitor closely.  Supportive cares. Medicate for pain as needed. Monitor for  spread of redness in the left leg( thigh)      
Problem: Patient Care Overview  Goal: Plan of Care/Patient Progress Review  Outcome: Therapy, progress toward functional goals as expected  Pt said L ft hurts, throbs, but swelling has gone down & redness has decreased. Pt said he is able to bend his knee & flex his ft more than he could a few days ago. + CMS,  Can walk to bathroom but mainly keeps leg elevated, put ice on knee. Tolerated reg diet. BG in normal limits.       
Problem: Patient Care Overview  Goal: Plan of Care/Patient Progress Review  PT: Pt orders received and acknowledged. Chart reviewed and conversation had with both RN and pt. Pt endorsing R shoulder pain present since fall 2 weeks ago. ROM intact however painful with palpable clicking. Besides pain in R shoulder, pt is IND in room, and plan is to discharge tomorrow to home. Pt advised to rest shoulder d/t acute pain flare up and to perform pain free ROM, with demo. Pt advised with discharge home to follow up with primary care concerning pain d/t condition most likely requiring longer term care and follow up best suited for an outpatient setting. Will defer inpatient eval.       
Problem: Patient Care Overview  Goal: Plan of Care/Patient Progress Review  Patient A&O x4, denied CP, lightheadedness, dizziness, numbness, tingling and SOB. LBM today per pt. eport, Left PIV patent and infusing NS at 100 ml/hr, voiding spontaneously without difficulties, CMS intact, PRN tylenol and tramadol given once for left leg pain, BG monitored and insuline given as order, self repositioned and turned in bed, able to use call light appropriately and make needs known, will continue to monitor patient as POC.         
Problem: Patient Care Overview  Goal: Plan of Care/Patient Progress Review  Pt. admitted from ER at 1730 via wheelchair and transferred to bed via walking. Pt. accompanied by escort staff and arrived with personal belongings. Report was taken from Saskia in ER.  Pt. is A&Ox3 and VSS on admission. CMS intact. Pt. c/o no pain lower leg. Pt. denied any nausea, CP, SOB, lightheadedness, or dizziness. LS clear and BS active. PIV patent and infusing fluids in left forearm.  PCDs and TEDs in place to BLE.  Pt. oriented to the room and call light system.        
Unknown if ever smoked

## 2022-10-15 ENCOUNTER — HEALTH MAINTENANCE LETTER (OUTPATIENT)
Age: 52
End: 2022-10-15

## 2023-03-26 ENCOUNTER — HEALTH MAINTENANCE LETTER (OUTPATIENT)
Age: 53
End: 2023-03-26

## 2023-05-15 ENCOUNTER — OFFICE VISIT (OUTPATIENT)
Dept: FAMILY MEDICINE | Facility: CLINIC | Age: 53
End: 2023-05-15
Payer: COMMERCIAL

## 2023-05-15 VITALS
DIASTOLIC BLOOD PRESSURE: 100 MMHG | HEIGHT: 68 IN | BODY MASS INDEX: 43.68 KG/M2 | HEART RATE: 93 BPM | OXYGEN SATURATION: 95 % | TEMPERATURE: 98.4 F | WEIGHT: 288.2 LBS | SYSTOLIC BLOOD PRESSURE: 142 MMHG

## 2023-05-15 DIAGNOSIS — I10 HYPERTENSION GOAL BP (BLOOD PRESSURE) < 140/90: ICD-10-CM

## 2023-05-15 DIAGNOSIS — J34.89 RHINORRHEA: ICD-10-CM

## 2023-05-15 DIAGNOSIS — M54.50 ACUTE BILATERAL LOW BACK PAIN WITHOUT SCIATICA: ICD-10-CM

## 2023-05-15 DIAGNOSIS — F33.1 MAJOR DEPRESSIVE DISORDER, RECURRENT EPISODE, MODERATE (H): Primary | ICD-10-CM

## 2023-05-15 DIAGNOSIS — Z12.11 SCREEN FOR COLON CANCER: ICD-10-CM

## 2023-05-15 DIAGNOSIS — E11.9 TYPE 2 DIABETES MELLITUS WITHOUT COMPLICATION, WITHOUT LONG-TERM CURRENT USE OF INSULIN (H): ICD-10-CM

## 2023-05-15 DIAGNOSIS — M25.562 CHRONIC PAIN OF LEFT KNEE: ICD-10-CM

## 2023-05-15 DIAGNOSIS — G89.29 CHRONIC PAIN OF LEFT KNEE: ICD-10-CM

## 2023-05-15 LAB
ERYTHROCYTE [DISTWIDTH] IN BLOOD BY AUTOMATED COUNT: 12 % (ref 10–15)
HBA1C MFR BLD: 9 % (ref 0–5.6)
HCT VFR BLD AUTO: 45.5 % (ref 40–53)
HGB BLD-MCNC: 16 G/DL (ref 13.3–17.7)
MCH RBC QN AUTO: 32.1 PG (ref 26.5–33)
MCHC RBC AUTO-ENTMCNC: 35.2 G/DL (ref 31.5–36.5)
MCV RBC AUTO: 91 FL (ref 78–100)
PLATELET # BLD AUTO: 281 10E3/UL (ref 150–450)
RBC # BLD AUTO: 4.98 10E6/UL (ref 4.4–5.9)
WBC # BLD AUTO: 6.3 10E3/UL (ref 4–11)

## 2023-05-15 PROCEDURE — 83036 HEMOGLOBIN GLYCOSYLATED A1C: CPT | Performed by: FAMILY MEDICINE

## 2023-05-15 PROCEDURE — 82043 UR ALBUMIN QUANTITATIVE: CPT | Performed by: FAMILY MEDICINE

## 2023-05-15 PROCEDURE — 82607 VITAMIN B-12: CPT | Performed by: FAMILY MEDICINE

## 2023-05-15 PROCEDURE — 80061 LIPID PANEL: CPT | Performed by: FAMILY MEDICINE

## 2023-05-15 PROCEDURE — 36415 COLL VENOUS BLD VENIPUNCTURE: CPT | Performed by: FAMILY MEDICINE

## 2023-05-15 PROCEDURE — 99214 OFFICE O/P EST MOD 30 MIN: CPT | Performed by: FAMILY MEDICINE

## 2023-05-15 PROCEDURE — 82570 ASSAY OF URINE CREATININE: CPT | Performed by: FAMILY MEDICINE

## 2023-05-15 PROCEDURE — 85027 COMPLETE CBC AUTOMATED: CPT | Performed by: FAMILY MEDICINE

## 2023-05-15 PROCEDURE — 80053 COMPREHEN METABOLIC PANEL: CPT | Performed by: FAMILY MEDICINE

## 2023-05-15 PROCEDURE — 84443 ASSAY THYROID STIM HORMONE: CPT | Performed by: FAMILY MEDICINE

## 2023-05-15 RX ORDER — LOSARTAN POTASSIUM 50 MG/1
50 TABLET ORAL DAILY
Qty: 30 TABLET | Refills: 1 | Status: SHIPPED | OUTPATIENT
Start: 2023-05-15 | End: 2024-01-02

## 2023-05-15 RX ORDER — LOVASTATIN 20 MG
20 TABLET ORAL AT BEDTIME
Qty: 90 TABLET | Refills: 1 | Status: SHIPPED | OUTPATIENT
Start: 2023-05-15

## 2023-05-15 RX ORDER — CYCLOBENZAPRINE HCL 5 MG
5-10 TABLET ORAL 3 TIMES DAILY PRN
Qty: 21 TABLET | Refills: 0 | Status: SHIPPED | OUTPATIENT
Start: 2023-05-15

## 2023-05-15 ASSESSMENT — PATIENT HEALTH QUESTIONNAIRE - PHQ9
10. IF YOU CHECKED OFF ANY PROBLEMS, HOW DIFFICULT HAVE THESE PROBLEMS MADE IT FOR YOU TO DO YOUR WORK, TAKE CARE OF THINGS AT HOME, OR GET ALONG WITH OTHER PEOPLE: NOT DIFFICULT AT ALL
SUM OF ALL RESPONSES TO PHQ QUESTIONS 1-9: 7
SUM OF ALL RESPONSES TO PHQ QUESTIONS 1-9: 7

## 2023-05-15 ASSESSMENT — PAIN SCALES - GENERAL: PAINLEVEL: NO PAIN (1)

## 2023-05-15 ASSESSMENT — ENCOUNTER SYMPTOMS: COUGH: 1

## 2023-05-15 NOTE — PROGRESS NOTES
Assessment & Plan     Hypertension goal BP (blood pressure) < 140/90  He has stopped using his blood pressure medication.  He is willing to resume the same.  Recheck blood pressure in couple of months and adjust Rx pending his response.  - losartan (COZAAR) 50 MG tablet; Take 1 tablet (50 mg) by mouth daily    Major depressive disorder, recurrent episode, moderate (H)  Noticing mood is significantly worsening and that contributed to stopping all of his medication.  We discussed about medications.  He is not interested but he is willing to see a therapist.  Denies any concern about acute safety.  - Adult Mental Health  Referral; Future    Chronic pain of left knee  Status post knee surgery.  The symptoms are worsening.  He is planning to follow-up with Ortho.  New referral given.  - Orthopedic  Referral; Future    Type 2 diabetes mellitus without complication, without long-term current use of insulin (H)  A1c is really high as expected.  We will resume metformin.  Consider GLP-1 agent which can help with morbid obesity but we will talk more about it when I see him next.  - Hemoglobin A1c; Future  - Lipid panel reflex to direct LDL Fasting; Future  - Comprehensive metabolic panel; Future  - Albumin Random Urine Quantitative with Creat Ratio; Future  - CBC with platelets; Future  - Vitamin B12; Future  - TSH with free T4 reflex; Future  - lovastatin (MEVACOR) 20 MG tablet; Take 1 tablet (20 mg) by mouth At Bedtime  - metFORMIN (GLUCOPHAGE) 500 MG tablet; TAKE 1 TABLET BY MOUTH every other day WITH DINNER  - Hemoglobin A1c  - Lipid panel reflex to direct LDL Fasting  - Comprehensive metabolic panel  - Albumin Random Urine Quantitative with Creat Ratio  - CBC with platelets  - Vitamin B12  - TSH with free T4 reflex    Acute bilateral low back pain without sciatica  Lumbar spinal stiffness.  Flexeril as needed.  Side effects discussed.  He has used this before.  - cyclobenzaprine (FLEXERIL) 5 MG  "tablet; Take 1-2 tablets (5-10 mg) by mouth 3 times daily as needed for muscle spasms    Rhinorrhea  Chronic issue.  Been to ENT.  We will resume Flonase.  He is requesting antibiotics.  I do not think antibiotics are indicated at this point -total duration of symptoms 2 days.  We discussed what symptoms to watch for and when to seek follow-up care.    Screen for colon cancer     - Colonoscopy Screening  Referral; Future             BMI:   Estimated body mass index is 43.82 kg/m  as calculated from the following:    Height as of this encounter: 1.727 m (5' 8\").    Weight as of this encounter: 130.7 kg (288 lb 3.2 oz).   Weight management plan: Discussed healthy diet and exercise guidelines         Quoc Flynn MD, MD  [facility]    Jean-Pierre Chu is a 52 year old, presenting for the following health issues:  Cough, Sinus Problem, and Refill Request        5/15/2023     3:03 PM   Additional Questions   Roomed by Pranav SINGER RN     Cough    Sinus Problem   Associated symptoms include cough.   History of Present Illness       Reason for visit:  Sinus infection  Symptom onset:  1-3 days ago  Symptom intensity:  Severe  Symptom progression:  Worsening  Had these symptoms before:  Yes  Has tried/received treatment for these symptoms:  Yes  Previous treatment was successful:  Yes  Prior treatment description:  Anti biotics  What makes it worse:  No  What makes it better:  Anti biotics    He eats 0-1 servings of fruits and vegetables daily.He consumes 1 sweetened beverage(s) daily.He exercises with enough effort to increase his heart rate 9 or less minutes per day.  He exercises with enough effort to increase his heart rate 3 or less days per week. He is missing 7 dose(s) of medications per week.    Today's PHQ-9         PHQ-9 Total Score: 7    PHQ-9 Q9 Thoughts of better off dead/self-harm past 2 weeks :   Not at all    How difficult have these problems made it for you to do " your work, take care of things at home, or get along with other people: Not difficult at all    Lots of nose drainage.   Ears are popping. Stuff comes up in the morning. Hard to breath.   Started on its own.   Symptoms started yesterday.   Post nasal drainage.   flonase - need to be using it again. Have to start it again.  No smoke exposure. Allergies are playing role.   Sinus pressure L>R.   Been to ENT in 2021.   No upper teeth pain.     Over the last year - struggling with lots of stuff. Not taking meds for blood sugar, pressure, depression.   Needs to talk to a therapist.     Back of calf left knee - discomfort. Random days of pain and that it gets better. Last 2 weeks more painful.     Review of Systems   Respiratory: Positive for cough.             Objective    There were no vitals taken for this visit.  There is no height or weight on file to calculate BMI.  Physical Exam

## 2023-05-15 NOTE — PATIENT INSTRUCTIONS
Push more fluids.  Start oxymetazolin (afrin) nasal spray twice per day for 5 days.   Start flonse daily.   If symptoms are lasting more than 10 days - needs follow up.     For mental health and for knee - referral placed.       =======================================  FYI:     System will autorelease results as soon as they are available. I wait for all results to be ready before sending you a comment or message.  Be assured I will review and comment on all of your results as soon as I can.     I am at [facility]  ([phone]) usually Monday, Tuesday and Wednesday.   Messages, evisits, phone calls received on Thursday and Friday may not get responded very urgently but I will try to respond as soon as possible.     2) My schedule sometime been booking out far in advance.  I apologize for the lack of timely access.  If you need to be seen for a chronic condition or preventive (wellness) visit, please be sure to schedule that appointment few months in advance.  If you have a concern that you feel cannot wait until my next available appointment (such as a hospital follow-up or new symptom of concern) please ask to speak to one of the [facility] nurses who may be able to access a sooner appointment.      You can schedule a video visit for follow-up appointments as well as future appointments for certain conditions.  Please see the below link.     Video Visits ([email or web address])     If you have not already done so,  I encourage you to sign up for [employer] ([email or web address]).  This will allow you to review your results, securely communicate with a provider, and schedule virtual visits as well.

## 2023-05-16 LAB
ALBUMIN SERPL BCG-MCNC: 4.4 G/DL (ref 3.5–5.2)
ALP SERPL-CCNC: 167 U/L (ref 40–129)
ALT SERPL W P-5'-P-CCNC: 69 U/L (ref 10–50)
ANION GAP SERPL CALCULATED.3IONS-SCNC: 14 MMOL/L (ref 7–15)
AST SERPL W P-5'-P-CCNC: 42 U/L (ref 10–50)
BILIRUB SERPL-MCNC: 0.5 MG/DL
BUN SERPL-MCNC: 9.8 MG/DL (ref 6–20)
CALCIUM SERPL-MCNC: 9.7 MG/DL (ref 8.6–10)
CHLORIDE SERPL-SCNC: 100 MMOL/L (ref 98–107)
CHOLEST SERPL-MCNC: 194 MG/DL
CREAT SERPL-MCNC: 0.7 MG/DL (ref 0.67–1.17)
DEPRECATED HCO3 PLAS-SCNC: 24 MMOL/L (ref 22–29)
GFR SERPL CREATININE-BSD FRML MDRD: >90 ML/MIN/1.73M2
GLUCOSE SERPL-MCNC: 252 MG/DL (ref 70–99)
HDLC SERPL-MCNC: 35 MG/DL
LDLC SERPL CALC-MCNC: 136 MG/DL
NONHDLC SERPL-MCNC: 159 MG/DL
POTASSIUM SERPL-SCNC: 4.2 MMOL/L (ref 3.4–5.3)
PROT SERPL-MCNC: 7.7 G/DL (ref 6.4–8.3)
SODIUM SERPL-SCNC: 138 MMOL/L (ref 136–145)
TRIGL SERPL-MCNC: 113 MG/DL
TSH SERPL DL<=0.005 MIU/L-ACNC: 1.58 UIU/ML (ref 0.3–4.2)
VIT B12 SERPL-MCNC: 561 PG/ML (ref 232–1245)

## 2023-05-17 LAB
CREAT UR-MCNC: 159 MG/DL
MICROALBUMIN UR-MCNC: 59.9 MG/L
MICROALBUMIN/CREAT UR: 37.67 MG/G CR (ref 0–17)

## 2023-05-22 ENCOUNTER — MYC MEDICAL ADVICE (OUTPATIENT)
Dept: FAMILY MEDICINE | Facility: CLINIC | Age: 53
End: 2023-05-22
Payer: COMMERCIAL

## 2023-05-24 NOTE — TELEPHONE ENCOUNTER
Writer responded via [employer].    Jamila Shepherd, RADHAN RN  [facility]

## 2024-01-02 ENCOUNTER — OFFICE VISIT (OUTPATIENT)
Dept: URGENT CARE | Facility: URGENT CARE | Age: 54
End: 2024-01-02
Payer: COMMERCIAL

## 2024-01-02 VITALS
SYSTOLIC BLOOD PRESSURE: 170 MMHG | TEMPERATURE: 97.9 F | BODY MASS INDEX: 41.22 KG/M2 | OXYGEN SATURATION: 96 % | DIASTOLIC BLOOD PRESSURE: 116 MMHG | HEART RATE: 102 BPM | HEIGHT: 68 IN | WEIGHT: 272 LBS

## 2024-01-02 DIAGNOSIS — J01.00 ACUTE NON-RECURRENT MAXILLARY SINUSITIS: ICD-10-CM

## 2024-01-02 DIAGNOSIS — I10 HYPERTENSION GOAL BP (BLOOD PRESSURE) < 140/90: ICD-10-CM

## 2024-01-02 DIAGNOSIS — H72.92 PERFORATION OF TYMPANIC MEMBRANE, LEFT: Primary | ICD-10-CM

## 2024-01-02 PROCEDURE — 99214 OFFICE O/P EST MOD 30 MIN: CPT | Performed by: FAMILY MEDICINE

## 2024-01-02 RX ORDER — GUAIFENESIN AND DEXTROMETHORPHAN HYDROBROMIDE 600; 30 MG/1; MG/1
1 TABLET, EXTENDED RELEASE ORAL EVERY 12 HOURS
COMMUNITY

## 2024-01-02 RX ORDER — LOSARTAN POTASSIUM 50 MG/1
50 TABLET ORAL DAILY
Qty: 30 TABLET | Refills: 1 | Status: SHIPPED | OUTPATIENT
Start: 2024-01-02 | End: 2024-03-14

## 2024-01-02 NOTE — PROGRESS NOTES
Assessment & Plan     Perforation of tympanic membrane, left  - amoxicillin-clavulanate (AUGMENTIN) 875-125 MG tablet  Dispense: 14 tablet; Refill: 0    Hypertension goal BP (blood pressure) < 140/90  - losartan (COZAAR) 50 MG tablet  Dispense: 30 tablet; Refill: 1    Acute non-recurrent maxillary sinusitis  - amoxicillin-clavulanate (AUGMENTIN) 875-125 MG tablet  Dispense: 14 tablet; Refill: 0     Likely has a left ear perforation from infection, will start on augmentin to cover for both ear and sinus infection.     Reports of bloody nose and here has elevated BP (also possible rhinorrhea/cold symptoms causing his small episodes), I sent a refill of Losartan today. No obvious stroke symptoms present today. He as also advised to purchase an [employer] home blood pressure cuff    See AVS summary for additional recommendations reviewed with patient during this visit.       Renard Bone MD   [facility]    Subjective     Vlad is a 53 year old male who presents to clinic today for the following health issues:  Chief Complaint   Patient presents with    Urgent Care    Sinus Problem    Nasal Congestion    Ear Problem     Pt in clinic to have eval for sinus pressure, nose bleeds, nasal congestion, headache, and ear pain.    Nose Bleeds     HPI    No breathing difficulties. This all started before chelsey iveth with cold symptoms which he then develop sinus pressure/ear pain. In the last couple days felt pressure left ear and a pop sensation, ears a crackling noise on this left side    Patient also noted that he has not taken his blood pressure medication and over three months has had the time to go  the medication he reports.    Has  has a history of sinus issues was seen by ear nose throat specialist in the last year. For a period used nasal steroids    Patient Active Problem List    Diagnosis Date Noted    Pneumonia due to COVID-19 virus 06/16/2020     Priority: Medium    Personal history of DVT (deep  "vein thrombosis) 06/05/2020     Priority: Medium    Acute bilateral low back pain without sciatica 01/29/2019     Priority: Medium    Cellulitis 11/18/2018     Priority: Medium    Major depressive disorder, recurrent episode, moderate (H) 10/14/2016     Priority: Medium    Type 2 diabetes mellitus without complication (H) 01/31/2014     Priority: Medium    Hypertension goal BP (blood pressure) < 140/90 01/31/2014     Priority: Medium    Hyperlipidemia with target LDL less than 100 01/31/2014     Priority: Medium     Diagnosis updated by automated process. Provider to review and confirm.      Genital warts 06/11/2008     Priority: Medium    Eczema 06/11/2008     Priority: Medium    obese bmi over 35      Priority: Medium       Current Outpatient Medications   Medication    amoxicillin-clavulanate (AUGMENTIN) 875-125 MG tablet    Dextromethorphan HBr (VICKS DAYQUIL COUGH PO)    dextromethorphan-guaiFENesin (MUCINEX DM)  MG 12 hr tablet    losartan (COZAAR) 50 MG tablet    Pseudoeph-Doxylamine-DM-APAP (NYQUIL PO)    cyclobenzaprine (FLEXERIL) 5 MG tablet    famotidine (PEPCID) 20 MG tablet    lovastatin (MEVACOR) 20 MG tablet    metFORMIN (GLUCOPHAGE) 500 MG tablet    omeprazole (PRILOSEC) 20 MG DR capsule     No current facility-administered medications for this visit.           Objective    BP (!) 170/116   Pulse 102   Temp 97.9  F (36.6  C) (Temporal)   Ht 1.727 m (5' 8\")   Wt 123.4 kg (272 lb)   SpO2 96%   BMI 41.36 kg/m    Physical Exam       LungsL clear bilaterally  Ears: mild cerumen right canal, left TM has residue of blood obscuring complete view  CV: HDS  GEN: NAD  No results found for any visits on 01/02/24.                  The use of Dragon/Sab[employer] dictation services may have been used to construct the content in this note; any grammatical or spelling errors are non-intentional. Please contact the author of this note directly if you are in need of any clarification.   "

## 2024-01-02 NOTE — PATIENT INSTRUCTIONS
Check your blood pressure after you start on the losartan again. If your systolic pressure continues above 160 or diastolic pressure over 100 please call your doctor's office    Augmentin antibiotic use to treat sinus infection/ear infection      Tylenol 500mg every 4 hours as needed for mild discomfort

## 2024-01-07 ENCOUNTER — HEALTH MAINTENANCE LETTER (OUTPATIENT)
Age: 54
End: 2024-01-07

## 2024-01-31 DIAGNOSIS — E11.9 TYPE 2 DIABETES MELLITUS WITHOUT COMPLICATION, WITHOUT LONG-TERM CURRENT USE OF INSULIN (H): ICD-10-CM

## 2024-01-31 NOTE — TELEPHONE ENCOUNTER
Patient's voicemail box is not set up sent patient a [employer] message to schedule an annual preventative

## 2024-03-14 DIAGNOSIS — I10 HYPERTENSION GOAL BP (BLOOD PRESSURE) < 140/90: ICD-10-CM

## 2024-03-15 RX ORDER — LOSARTAN POTASSIUM 50 MG/1
50 TABLET ORAL DAILY
Qty: 90 TABLET | Refills: 0 | Status: SHIPPED | OUTPATIENT
Start: 2024-03-15

## 2024-03-15 NOTE — TELEPHONE ENCOUNTER
Patient due for appointment for yearly check up with Dr. Flynn in May  I sent dave refill x 90 days.  Reception: Please call to schedule patient for appointment.  Thank you!  SJ

## 2024-05-26 ENCOUNTER — HEALTH MAINTENANCE LETTER (OUTPATIENT)
Age: 54
End: 2024-05-26

## 2024-08-04 ENCOUNTER — HEALTH MAINTENANCE LETTER (OUTPATIENT)
Age: 54
End: 2024-08-04

## 2025-02-22 ENCOUNTER — HEALTH MAINTENANCE LETTER (OUTPATIENT)
Age: 55
End: 2025-02-22

## 2025-04-07 NOTE — TELEPHONE ENCOUNTER
DARONM regarding scheduling a SHERIF apt.  Prior client of [facility].   Sent my chart message asking him to schedule a diabetes with fasting labs before further refills of his metformin

## 2025-06-14 ENCOUNTER — HEALTH MAINTENANCE LETTER (OUTPATIENT)
Age: 55
End: 2025-06-14

## (undated) DEVICE — GUIDEWIRE TERUMO .035X180 ANG GR3508

## (undated) DEVICE — Device

## (undated) DEVICE — CANISTER ENGINE FOR INDIGO SYSTEM

## (undated) DEVICE — INTRO SHEATH BRITE TIP 8FRX11CM 401-811M

## (undated) DEVICE — DRSG TEGADERM 2 3/8X2 3/4" 1624W

## (undated) DEVICE — NDL BLUNT 18GA 1" W/O FILTER 305181

## (undated) DEVICE — SYR 10ML LL W/O NDL 302995

## (undated) DEVICE — COVER NEOPROBE SOFTFLEX 5X96" W/BANDS 20-PC596

## (undated) DEVICE — INTRO SHEATH BRITE TIP 5FRX11CM 401-511M

## (undated) DEVICE — INTRO SHEATH BRITE TIP 6FRX11CM 401-611M

## (undated) DEVICE — CATH OMNIFLUSH 5FRX70CM SIZING 13709702

## (undated) DEVICE — GUIDEWIRE SPARTA CORE .014"X300CM 5CM TIP SS 1005203

## (undated) DEVICE — GUIDEWIRE ROSEN CVD .035X260CM G01253 THSCF-35-260-1.5-ROSEN

## (undated) DEVICE — DEVICE HIGH PRESSURE FLOSWITCH FLOW CONTROL M001442010

## (undated) DEVICE — CATH ANGIO SELECTIVE SOFT-VU 5FRX100CM JB1 10734102

## (undated) DEVICE — CATH TRAY FOLEY SURESTEP 16FR W/URINE MTR STATLK LF A303416A

## (undated) DEVICE — RAD CLOSURE ANGIOSEAL 8FR  610131

## (undated) DEVICE — KIT MICRO-INTRODUCER STIFFEN 4FR 7274V

## (undated) RX ORDER — ONDANSETRON 2 MG/ML
INJECTION INTRAMUSCULAR; INTRAVENOUS
Status: DISPENSED
Start: 2020-06-05

## (undated) RX ORDER — PHENYLEPHRINE HCL IN 0.9% NACL 1 MG/10 ML
SYRINGE (ML) INTRAVENOUS
Status: DISPENSED
Start: 2020-06-05

## (undated) RX ORDER — GLYCOPYRROLATE 0.2 MG/ML
INJECTION, SOLUTION INTRAMUSCULAR; INTRAVENOUS
Status: DISPENSED
Start: 2020-06-05

## (undated) RX ORDER — LIDOCAINE HYDROCHLORIDE 20 MG/ML
INJECTION, SOLUTION EPIDURAL; INFILTRATION; INTRACAUDAL; PERINEURAL
Status: DISPENSED
Start: 2020-06-05

## (undated) RX ORDER — IODIXANOL 320 MG/ML
INJECTION, SOLUTION INTRAVASCULAR
Status: DISPENSED
Start: 2020-06-05

## (undated) RX ORDER — ESMOLOL HYDROCHLORIDE 10 MG/ML
INJECTION INTRAVENOUS
Status: DISPENSED
Start: 2020-06-05

## (undated) RX ORDER — FENTANYL CITRATE 50 UG/ML
INJECTION, SOLUTION INTRAMUSCULAR; INTRAVENOUS
Status: DISPENSED
Start: 2020-06-05

## (undated) RX ORDER — HEPARIN SODIUM 1000 [USP'U]/ML
INJECTION, SOLUTION INTRAVENOUS; SUBCUTANEOUS
Status: DISPENSED
Start: 2020-06-05

## (undated) RX ORDER — PROPOFOL 10 MG/ML
INJECTION, EMULSION INTRAVENOUS
Status: DISPENSED
Start: 2020-06-05

## (undated) RX ORDER — LIDOCAINE HYDROCHLORIDE 10 MG/ML
INJECTION, SOLUTION EPIDURAL; INFILTRATION; INTRACAUDAL; PERINEURAL
Status: DISPENSED
Start: 2020-06-05

## (undated) RX ORDER — EPHEDRINE SULFATE 50 MG/ML
INJECTION, SOLUTION INTRAMUSCULAR; INTRAVENOUS; SUBCUTANEOUS
Status: DISPENSED
Start: 2020-06-05